# Patient Record
Sex: MALE | Race: WHITE | NOT HISPANIC OR LATINO | Employment: UNEMPLOYED | URBAN - METROPOLITAN AREA
[De-identification: names, ages, dates, MRNs, and addresses within clinical notes are randomized per-mention and may not be internally consistent; named-entity substitution may affect disease eponyms.]

---

## 2017-01-27 ENCOUNTER — OFFICE VISIT (OUTPATIENT)
Dept: FAMILY MEDICINE | Facility: CLINIC | Age: 37
End: 2017-01-27
Payer: COMMERCIAL

## 2017-01-27 VITALS
SYSTOLIC BLOOD PRESSURE: 132 MMHG | HEART RATE: 93 BPM | HEIGHT: 72 IN | WEIGHT: 315 LBS | BODY MASS INDEX: 42.66 KG/M2 | DIASTOLIC BLOOD PRESSURE: 88 MMHG | OXYGEN SATURATION: 98 %

## 2017-01-27 DIAGNOSIS — J45.20 MILD INTERMITTENT ASTHMA WITHOUT COMPLICATION: ICD-10-CM

## 2017-01-27 DIAGNOSIS — I10 ESSENTIAL HYPERTENSION: Primary | ICD-10-CM

## 2017-01-27 DIAGNOSIS — F33.42 RECURRENT MAJOR DEPRESSIVE DISORDER, IN FULL REMISSION: ICD-10-CM

## 2017-01-27 DIAGNOSIS — J30.1 NON-SEASONAL ALLERGIC RHINITIS DUE TO POLLEN: ICD-10-CM

## 2017-01-27 PROCEDURE — 99999 PR PBB SHADOW E&M-EST. PATIENT-LVL III: CPT | Mod: PBBFAC,,, | Performed by: FAMILY MEDICINE

## 2017-01-27 PROCEDURE — 1159F MED LIST DOCD IN RCRD: CPT | Mod: S$GLB,,, | Performed by: FAMILY MEDICINE

## 2017-01-27 PROCEDURE — 99214 OFFICE O/P EST MOD 30 MIN: CPT | Mod: S$GLB,,, | Performed by: FAMILY MEDICINE

## 2017-01-27 PROCEDURE — 3075F SYST BP GE 130 - 139MM HG: CPT | Mod: S$GLB,,, | Performed by: FAMILY MEDICINE

## 2017-01-27 PROCEDURE — 3079F DIAST BP 80-89 MM HG: CPT | Mod: S$GLB,,, | Performed by: FAMILY MEDICINE

## 2017-01-27 RX ORDER — HYDROCHLOROTHIAZIDE 25 MG/1
25 TABLET ORAL DAILY
Qty: 30 TABLET | Refills: 11 | Status: SHIPPED | OUTPATIENT
Start: 2017-01-27 | End: 2017-04-24

## 2017-01-27 RX ORDER — MONTELUKAST SODIUM 10 MG/1
10 TABLET ORAL DAILY
COMMUNITY
Start: 2016-12-29 | End: 2018-01-24

## 2017-01-27 RX ORDER — FLUOXETINE HYDROCHLORIDE 20 MG/1
20 CAPSULE ORAL DAILY
COMMUNITY
Start: 2016-11-29 | End: 2017-05-25

## 2017-01-27 NOTE — PROGRESS NOTES
Subjective:       Patient ID: Greg Garcia is a 36 y.o. male.    Chief Complaint: Depression    HPI     bp at home syst low 140's.      Given new auto cpap 2 1/2 weeks ago.  Reports sleeping better in re: to his new cpap    Depression stable at present. Some days, feels more down than others. Compliant with his medications.     Asthma stable. No wheeze or cough.     Chronic rhinitis stable.       Review of Systems      Review of Systems   Constitutional: Negative for fever and chills.   HENT: Negative for hearing loss and neck stiffness.    Eyes: Negative for redness and itching.   Respiratory: Negative for cough and choking.    Cardiovascular: Negative for chest pain and leg swelling.  Abdomen: Negative for abdominal pain and blood in stool.   Genitourinary: Negative for dysuria and flank pain.   Musculoskeletal: Negative for back pain and gait problem.   Neurological: Negative for light-headedness and headaches.   Hematological: Negative for adenopathy.   Psychiatric/Behavioral: Negative for behavioral problems.       Objective:      Physical Exam   HENT:   Head: Atraumatic.   Eyes: Conjunctivae are normal. Pupils are equal, round, and reactive to light.   Neck: Normal range of motion.   Cardiovascular: Normal rate and regular rhythm.    No murmur heard.  Pulmonary/Chest: Effort normal and breath sounds normal. He has no wheezes.   Lymphadenopathy:     He has no cervical adenopathy.       Assessment:       1. Essential hypertension    2. Mild intermittent asthma without complication    3. Non-seasonal allergic rhinitis due to pollen    4. Recurrent major depressive disorder, in full remission        Plan:       Essential hypertension    Mild intermittent asthma without complication    Non-seasonal allergic rhinitis due to pollen    Recurrent major depressive disorder, in full remission    Other orders  -     hydrochlorothiazide (HYDRODIURIL) 25 MG tablet; Take 1 tablet (25 mg total) by mouth once daily.   Dispense: 30 tablet; Refill: 11              Plan:  Inc hctz to 25 mg daily. Serial bp checks  Cont all other meds        Medication List with Changes/Refills   New Medications    HYDROCHLOROTHIAZIDE (HYDRODIURIL) 25 MG TABLET    Take 1 tablet (25 mg total) by mouth once daily.   Current Medications    ALBUTEROL (PROVENTIL) 5 MG/ML NEBULIZER SOLUTION    Take 2.5 mg by nebulization every 6 (six) hours as needed for Wheezing.    AZELASTINE (ASTELIN) 137 MCG (0.1 %) NASAL SPRAY    1 spray (137 mcg total) by Nasal route 2 (two) times daily.    BUPROPION (WELLBUTRIN XL) 150 MG TB24 TABLET        CETIRIZINE-PSEUDOEPHEDRINE 5-120 MG TB12    Take 5 mg by mouth 2 (two) times daily.    FLUOXETINE (PROZAC) 20 MG CAPSULE    Take 20 mg by mouth once daily.    FLUOXETINE (PROZAC) 40 MG CAPSULE    Take 40 mg by mouth once daily.     FLUTICASONE (FLONASE) 50 MCG/ACTUATION NASAL SPRAY    2 sprays by Each Nare route once daily.    FLUTICASONE-SALMETEROL 250-50 MCG/DOSE (ADVAIR DISKUS) 250-50 MCG/DOSE DISKUS INHALER    Inhale 1 puff into the lungs as needed.    GLUCOSAMINE-CHONDROITIN 500-400 MG TABLET    Take 1 tablet by mouth 2 (two) times daily.    IBUPROFEN (ADVIL,MOTRIN) 200 MG TABLET    Take 200 mg by mouth 2 (two) times daily. 4 tab twice daily    MONTELUKAST (SINGULAIR) 10 MG TABLET    Take 10 mg by mouth once daily.    MULTIVITAMIN (ONE DAILY MULTIVITAMIN) PER TABLET    Take 1 tablet by mouth once daily.    OMEPRAZOLE (PRILOSEC OTC) 20 MG TABLET    Take 20 mg by mouth once daily.   Discontinued Medications    FLUOXETINE (PROZAC) 10 MG CAPSULE    Take 20 mg by mouth once daily.     HYDROCHLOROTHIAZIDE (MICROZIDE) 12.5 MG CAPSULE    Take 1 capsule (12.5 mg total) by mouth once daily.    LEVOCETIRIZINE (XYZAL) 5 MG TABLET    Take 1 tablet (5 mg total) by mouth every evening.

## 2017-01-27 NOTE — MR AVS SNAPSHOT
[facility]  [address]  [facility] [address]  Phone: [phone]  Fax: [phone]                  Greg Garcia   2017 9:20 AM   Office Visit    Description:  Male : 1980   Provider:  Ignacio Terrell MD   Department:  [facility]           Reason for Visit     Depression           Diagnoses this Visit        Comments    Essential hypertension    -  Primary     Mild intermittent asthma without complication         Non-seasonal allergic rhinitis due to pollen         Recurrent major depressive disorder, in full remission                To Do List           Future Appointments        Provider Department Dept Phone    2017 10:00 AM INJECTION, INFECTIOUS DISEASES [address] Injection Room [phone]    2017 9:20 AM Ignacio Terrell MD [facility] [phone]      Goals (5 Years of Data)     None      Follow-Up and Disposition     Return in about 6 months (around 2017).       These Medications        Disp Refills Start End    hydrochlorothiazide (HYDRODIURIL) 25 MG tablet 30 tablet 11 2017     Take 1 tablet (25 mg total) by mouth once daily. - Oral    Pharmacy: [facility] - [place], [place] - [address]  Ph #: [phone]         [facility]     [facility] Nurse Care Line -  Assistance  Registered nurses in the [facility] provide clinical advisement, health education, appointment booking, and other advisory services.  Call for this free service at [phone].             Medications           Message regarding Medications     Verify the changes and/or additions to your medication regime listed below are the same as discussed with your clinician today.  If any of these changes or additions are incorrect, please notify your healthcare provider.        START taking these NEW medications        Refills    hydrochlorothiazide (HYDRODIURIL) 25 MG tablet 11    Sig:  Take 1 tablet (25 mg total) by mouth once daily.    Class: Normal    Route: Oral      STOP taking these medications     levocetirizine (XYZAL) 5 MG tablet Take 1 tablet (5 mg total) by mouth every evening.    hydrochlorothiazide (MICROZIDE) 12.5 mg capsule Take 1 capsule (12.5 mg total) by mouth once daily.           Verify that the below list of medications is an accurate representation of the medications you are currently taking.  If none reported, the list may be blank. If incorrect, please contact your healthcare provider. Carry this list with you in case of emergency.           Current Medications     albuterol (PROVENTIL) 5 mg/mL nebulizer solution Take 2.5 mg by nebulization every 6 (six) hours as needed for Wheezing.    azelastine (ASTELIN) 137 mcg (0.1 %) nasal spray 1 spray (137 mcg total) by Nasal route 2 (two) times daily.    buPROPion (WELLBUTRIN XL) 150 MG TB24 tablet     cetirizine-pseudoephedrine 5-120 mg Tb12 Take 5 mg by mouth 2 (two) times daily.    fluoxetine (PROZAC) 20 MG capsule Take 20 mg by mouth once daily.    fluoxetine (PROZAC) 40 MG capsule Take 40 mg by mouth once daily.     fluticasone (FLONASE) 50 mcg/actuation nasal spray 2 sprays by Each Nare route once daily.    fluticasone-salmeterol 250-50 mcg/dose (ADVAIR DISKUS) 250-50 mcg/dose diskus inhaler Inhale 1 puff into the lungs as needed.    glucosamine-chondroitin 500-400 mg tablet Take 1 tablet by mouth 2 (two) times daily.    ibuprofen (ADVIL,MOTRIN) 200 MG tablet Take 200 mg by mouth 2 (two) times daily. 4 tab twice daily    montelukast (SINGULAIR) 10 mg tablet Take 10 mg by mouth once daily.    multivitamin (ONE DAILY MULTIVITAMIN) per tablet Take 1 tablet by mouth once daily.    omeprazole (PRILOSEC OTC) 20 MG tablet Take 20 mg by mouth once daily.    hydrochlorothiazide (HYDRODIURIL) 25 MG tablet Take 1 tablet (25 mg total) by mouth once daily.           Clinical Reference Information           Vital Signs - Last Recorded  Most  recent update: 1/27/2017 10:01 AM by Ignacio Terrell MD    BP Pulse Ht Wt SpO2 BMI    132/88 93 6' (1.829 m) (!) 165.3 kg (364 lb 6.7 oz) 98% 49.42 kg/m2      Blood Pressure          Most Recent Value    BP  132/88      Allergies as of 1/27/2017     No Known Allergies      Immunizations Administered on Date of Encounter - 1/27/2017     None

## 2017-01-30 ENCOUNTER — CLINICAL SUPPORT (OUTPATIENT)
Dept: INFECTIOUS DISEASES | Facility: CLINIC | Age: 37
End: 2017-01-30
Payer: COMMERCIAL

## 2017-01-30 DIAGNOSIS — Z71.84 TRAVEL ADVICE ENCOUNTER: ICD-10-CM

## 2017-01-30 DIAGNOSIS — Z23 IMMUNIZATION DUE: ICD-10-CM

## 2017-01-30 PROCEDURE — 99999 PR PBB SHADOW E&M-EST. PATIENT-LVL I: CPT | Mod: PBBFAC,,,

## 2017-01-30 PROCEDURE — 90471 IMMUNIZATION ADMIN: CPT | Mod: S$GLB,,, | Performed by: INTERNAL MEDICINE

## 2017-01-30 PROCEDURE — 90632 HEPA VACCINE ADULT IM: CPT | Mod: S$GLB,,, | Performed by: INTERNAL MEDICINE

## 2017-03-22 ENCOUNTER — PATIENT MESSAGE (OUTPATIENT)
Dept: FAMILY MEDICINE | Facility: CLINIC | Age: 37
End: 2017-03-22

## 2017-03-22 DIAGNOSIS — R53.83 FATIGUE, UNSPECIFIED TYPE: Primary | ICD-10-CM

## 2017-03-27 ENCOUNTER — LAB VISIT (OUTPATIENT)
Dept: LAB | Facility: HOSPITAL | Age: 37
End: 2017-03-27
Attending: FAMILY MEDICINE
Payer: COMMERCIAL

## 2017-03-27 DIAGNOSIS — R53.83 FATIGUE, UNSPECIFIED TYPE: ICD-10-CM

## 2017-03-27 LAB
BASOPHILS # BLD AUTO: 0.04 K/UL
BASOPHILS NFR BLD: 0.5 %
DIFFERENTIAL METHOD: NORMAL
EOSINOPHIL # BLD AUTO: 0.2 K/UL
EOSINOPHIL NFR BLD: 2.8 %
ERYTHROCYTE [DISTWIDTH] IN BLOOD BY AUTOMATED COUNT: 13 %
HCT VFR BLD AUTO: 43.6 %
HGB BLD-MCNC: 14.3 G/DL
LYMPHOCYTES # BLD AUTO: 2.6 K/UL
LYMPHOCYTES NFR BLD: 31.2 %
MCH RBC QN AUTO: 30.3 PG
MCHC RBC AUTO-ENTMCNC: 32.8 %
MCV RBC AUTO: 92 FL
MONOCYTES # BLD AUTO: 0.6 K/UL
MONOCYTES NFR BLD: 7.4 %
NEUTROPHILS # BLD AUTO: 4.8 K/UL
NEUTROPHILS NFR BLD: 57.9 %
PLATELET # BLD AUTO: 264 K/UL
PMV BLD AUTO: 12.4 FL
RBC # BLD AUTO: 4.72 M/UL
WBC # BLD AUTO: 8.23 K/UL

## 2017-03-27 PROCEDURE — 82306 VITAMIN D 25 HYDROXY: CPT

## 2017-03-27 PROCEDURE — 84443 ASSAY THYROID STIM HORMONE: CPT

## 2017-03-27 PROCEDURE — 85025 COMPLETE CBC W/AUTO DIFF WBC: CPT

## 2017-03-27 PROCEDURE — 36415 COLL VENOUS BLD VENIPUNCTURE: CPT | Mod: PO

## 2017-03-27 PROCEDURE — 82607 VITAMIN B-12: CPT

## 2017-03-28 LAB
25(OH)D3+25(OH)D2 SERPL-MCNC: 53 NG/ML
TSH SERPL DL<=0.005 MIU/L-ACNC: 2.9 UIU/ML
VIT B12 SERPL-MCNC: 683 PG/ML

## 2017-04-02 ENCOUNTER — PATIENT MESSAGE (OUTPATIENT)
Dept: FAMILY MEDICINE | Facility: CLINIC | Age: 37
End: 2017-04-02

## 2017-04-04 RX ORDER — AZELASTINE 1 MG/ML
SPRAY, METERED NASAL
Qty: 30 ML | Refills: 11 | Status: SHIPPED | OUTPATIENT
Start: 2017-04-04 | End: 2018-05-13 | Stop reason: SDUPTHER

## 2017-04-24 ENCOUNTER — OFFICE VISIT (OUTPATIENT)
Dept: FAMILY MEDICINE | Facility: CLINIC | Age: 37
End: 2017-04-24
Payer: COMMERCIAL

## 2017-04-24 VITALS
WEIGHT: 315 LBS | HEART RATE: 105 BPM | HEIGHT: 72 IN | DIASTOLIC BLOOD PRESSURE: 98 MMHG | OXYGEN SATURATION: 98 % | SYSTOLIC BLOOD PRESSURE: 138 MMHG | BODY MASS INDEX: 42.66 KG/M2

## 2017-04-24 DIAGNOSIS — I10 ESSENTIAL HYPERTENSION: ICD-10-CM

## 2017-04-24 DIAGNOSIS — R53.83 FATIGUE, UNSPECIFIED TYPE: Primary | ICD-10-CM

## 2017-04-24 PROCEDURE — 3080F DIAST BP >= 90 MM HG: CPT | Mod: S$GLB,,, | Performed by: FAMILY MEDICINE

## 2017-04-24 PROCEDURE — 3075F SYST BP GE 130 - 139MM HG: CPT | Mod: S$GLB,,, | Performed by: FAMILY MEDICINE

## 2017-04-24 PROCEDURE — 1160F RVW MEDS BY RX/DR IN RCRD: CPT | Mod: S$GLB,,, | Performed by: FAMILY MEDICINE

## 2017-04-24 PROCEDURE — 99999 PR PBB SHADOW E&M-EST. PATIENT-LVL III: CPT | Mod: PBBFAC,,, | Performed by: FAMILY MEDICINE

## 2017-04-24 PROCEDURE — 99214 OFFICE O/P EST MOD 30 MIN: CPT | Mod: S$GLB,,, | Performed by: FAMILY MEDICINE

## 2017-04-24 RX ORDER — ERGOCALCIFEROL 1.25 MG/1
5000 CAPSULE ORAL
COMMUNITY
End: 2017-06-08

## 2017-04-24 RX ORDER — LISINOPRIL 10 MG/1
10 TABLET ORAL DAILY
Qty: 30 TABLET | Refills: 5 | Status: SHIPPED | OUTPATIENT
Start: 2017-04-24 | End: 2017-07-27

## 2017-04-24 NOTE — PROGRESS NOTES
Subjective:       Patient ID: Greg Garcia is a 36 y.o. male.    Chief Complaint: Results and Fatigue    HPI     Reports fatigue over the past 3 months since I increased his hctz from 12.5 mg to 25 mg daily.      Review of Systems      Review of Systems   Constitutional: Negative for fever and chills.   HENT: Negative for hearing loss and neck stiffness.    Eyes: Negative for redness and itching.   Respiratory: Negative for cough and choking.    Cardiovascular: Negative for chest pain and leg swelling.  Abdomen: Negative for abdominal pain and blood in stool.   Genitourinary: Negative for dysuria and flank pain.   Musculoskeletal: Negative for back pain and gait problem.   Neurological: Negative for light-headedness and headaches.   Hematological: Negative for adenopathy.       Objective:      Physical Exam   HENT:   Head: Atraumatic.   Eyes: Conjunctivae are normal. Pupils are equal, round, and reactive to light.   Neck: Normal range of motion.   Cardiovascular: Normal rate and regular rhythm.    No murmur heard.  Pulmonary/Chest: Effort normal and breath sounds normal. He has no wheezes.   Lymphadenopathy:     He has no cervical adenopathy.       Assessment:       1. Fatigue, unspecified type    2. Essential hypertension        Plan:       Fatigue, unspecified type    Essential hypertension    Other orders  -     lisinopril 10 MG tablet; Take 1 tablet (10 mg total) by mouth once daily.  Dispense: 30 tablet; Refill: 5      Plan:  D/c hctz  Start lisinopril  Serial bp checks    Medication List with Changes/Refills   New Medications    LISINOPRIL 10 MG TABLET    Take 1 tablet (10 mg total) by mouth once daily.   Current Medications    ALBUTEROL (PROVENTIL) 5 MG/ML NEBULIZER SOLUTION    Take 2.5 mg by nebulization every 6 (six) hours as needed for Wheezing.    AZELASTINE (ASTELIN) 137 MCG (0.1 %) NASAL SPRAY    USE ONE SPRAY(S) IN EACH NOSTRIL TWICE DAILY    BUPROPION (WELLBUTRIN XL) 150 MG TB24 TABLET         CETIRIZINE-PSEUDOEPHEDRINE 5-120 MG TB12    Take 5 mg by mouth 2 (two) times daily.    ERGOCALCIFEROL (VITAMIN D2) 50,000 UNIT CAP    Take 50,000 Units by mouth every 7 days.    FLUOXETINE (PROZAC) 20 MG CAPSULE    Take 20 mg by mouth once daily.    FLUTICASONE (FLONASE) 50 MCG/ACTUATION NASAL SPRAY    2 sprays by Each Nare route once daily.    FLUTICASONE-SALMETEROL 250-50 MCG/DOSE (ADVAIR DISKUS) 250-50 MCG/DOSE DISKUS INHALER    Inhale 1 puff into the lungs as needed.    GLUCOSAMINE-CHONDROITIN 500-400 MG TABLET    Take 1 tablet by mouth 2 (two) times daily.    IBUPROFEN (ADVIL,MOTRIN) 200 MG TABLET    Take 200 mg by mouth 2 (two) times daily. 4 tab twice daily    MONTELUKAST (SINGULAIR) 10 MG TABLET    Take 10 mg by mouth once daily.    MULTIVITAMIN (ONE DAILY MULTIVITAMIN) PER TABLET    Take 1 tablet by mouth once daily.    OMEPRAZOLE (PRILOSEC OTC) 20 MG TABLET    Take 20 mg by mouth once daily.   Discontinued Medications    FLUOXETINE (PROZAC) 40 MG CAPSULE    Take 40 mg by mouth once daily.     HYDROCHLOROTHIAZIDE (HYDRODIURIL) 25 MG TABLET    Take 1 tablet (25 mg total) by mouth once daily.

## 2017-04-24 NOTE — MR AVS SNAPSHOT
[facility]  [address]  [place] [address]  Phone: [phone]  Fax: [phone]                  Greg Garcai   2017 3:40 PM   Office Visit    Description:  Male : 1980   Provider:  Ignacio Terrell MD   Department:  [facility]           Reason for Visit     Results     Fatigue                To Do List           Future Appointments        Provider Department Dept Phone    2017 9:20 AM Ignacio Terrell MD [facility] [phone]      Goals (5 Years of Data)     None      Follow-Up and Disposition     Return in about 3 months (around 2017).       These Medications        Disp Refills Start End    lisinopril 10 MG tablet 30 tablet 5 2017    Take 1 tablet (10 mg total) by mouth once daily. - Oral    Pharmacy: [facility] - [place] - [address]  Ph #: [phone]         [facility]     [facility] Nurse Care Line -  Assistance  Unless otherwise directed by your provider, please contact [facility], our nurse care line that is available for  assistance.     Registered nurses in the [facility] provide: appointment scheduling, clinical advisement, health education, and other advisory services.  Call: [phone] (toll free)               Medications           Message regarding Medications     Verify the changes and/or additions to your medication regime listed below are the same as discussed with your clinician today.  If any of these changes or additions are incorrect, please notify your healthcare provider.        START taking these NEW medications        Refills    lisinopril 10 MG tablet 5    Sig: Take 1 tablet (10 mg total) by mouth once daily.    Class: Normal    Route: Oral      STOP taking these medications     hydrochlorothiazide (HYDRODIURIL) 25 MG tablet Take 1 tablet (25 mg total) by mouth once daily.           Verify that the  below list of medications is an accurate representation of the medications you are currently taking.  If none reported, the list may be blank. If incorrect, please contact your healthcare provider. Carry this list with you in case of emergency.           Current Medications     albuterol (PROVENTIL) 5 mg/mL nebulizer solution Take 2.5 mg by nebulization every 6 (six) hours as needed for Wheezing.    azelastine (ASTELIN) 137 mcg (0.1 %) nasal spray USE ONE SPRAY(S) IN EACH NOSTRIL TWICE DAILY    buPROPion (WELLBUTRIN XL) 150 MG TB24 tablet     cetirizine-pseudoephedrine 5-120 mg Tb12 Take 5 mg by mouth 2 (two) times daily.    ergocalciferol (VITAMIN D2) 50,000 unit Cap Take 50,000 Units by mouth every 7 days.    fluoxetine (PROZAC) 20 MG capsule Take 20 mg by mouth once daily.    fluticasone (FLONASE) 50 mcg/actuation nasal spray 2 sprays by Each Nare route once daily.    fluticasone-salmeterol 250-50 mcg/dose (ADVAIR DISKUS) 250-50 mcg/dose diskus inhaler Inhale 1 puff into the lungs as needed.    glucosamine-chondroitin 500-400 mg tablet Take 1 tablet by mouth 2 (two) times daily.    ibuprofen (ADVIL,MOTRIN) 200 MG tablet Take 200 mg by mouth 2 (two) times daily. 4 tab twice daily    montelukast (SINGULAIR) 10 mg tablet Take 10 mg by mouth once daily.    multivitamin (ONE DAILY MULTIVITAMIN) per tablet Take 1 tablet by mouth once daily.    omeprazole (PRILOSEC OTC) 20 MG tablet Take 20 mg by mouth once daily.    lisinopril 10 MG tablet Take 1 tablet (10 mg total) by mouth once daily.           Clinical Reference Information           Your Vitals Were     BP Pulse Height Weight SpO2 BMI    138/98 105 6' (1.829 m) 171.8 kg (378 lb 12 oz) 98% 51.37 kg/m2      Blood Pressure          Most Recent Value    BP  (!)  138/98      Allergies as of 4/24/2017     No Known Allergies      Immunizations Administered on Date of Encounter - 4/24/2017     None      Language Assistance Services     ATTENTION: Language assistance  services are available, free of charge. Please call [phone].      ATENCIÓN: Si habla paula, tiene a lay disposición servicios gratuitos de asistencia lingüística. Llame al [phone].     CHÚ Ý: N?u b?n nói Ti?ng Vi?t, có các d?ch v? h? tr? ngôn ng? mi?n phí dành cho b?n. G?i s? [phone].         [facility] complies with applicable Federal civil rights laws and does not discriminate on the basis of race, color, national origin, age, disability, or sex.

## 2017-05-17 ENCOUNTER — TELEPHONE (OUTPATIENT)
Dept: FAMILY MEDICINE | Facility: CLINIC | Age: 37
End: 2017-05-17

## 2017-05-17 NOTE — TELEPHONE ENCOUNTER
----- Message from Roberta Talbot sent at 5/17/2017  4:00 PM CDT -----  Patient is calling to ask for a prescription for jock itch. He has tried over the counter medications but to no avail. It has been over a week now. Please remit to:      [facility] [place] [place] [facility] [address]  [address]  [facility] [address]  Phone: [phone] Fax: [phone]    Please call patient back with advice or when completed at [phone].

## 2017-05-18 RX ORDER — TERBINAFINE HYDROCHLORIDE 250 MG/1
250 TABLET ORAL DAILY
Qty: 14 TABLET | Refills: 0 | Status: SHIPPED | OUTPATIENT
Start: 2017-05-18 | End: 2017-06-01

## 2017-05-24 ENCOUNTER — NURSE TRIAGE (OUTPATIENT)
Dept: ADMINISTRATIVE | Facility: CLINIC | Age: 37
End: 2017-05-24

## 2017-05-25 ENCOUNTER — OFFICE VISIT (OUTPATIENT)
Dept: FAMILY MEDICINE | Facility: CLINIC | Age: 37
End: 2017-05-25
Payer: COMMERCIAL

## 2017-05-25 VITALS
TEMPERATURE: 98 F | WEIGHT: 315 LBS | SYSTOLIC BLOOD PRESSURE: 156 MMHG | HEART RATE: 78 BPM | BODY MASS INDEX: 42.66 KG/M2 | HEIGHT: 72 IN | DIASTOLIC BLOOD PRESSURE: 80 MMHG

## 2017-05-25 DIAGNOSIS — H10.33 ACUTE CONJUNCTIVITIS OF BOTH EYES, UNSPECIFIED ACUTE CONJUNCTIVITIS TYPE: ICD-10-CM
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
## 2017-05-25 DIAGNOSIS — Z00.00 PREVENTATIVE HEALTH CARE: ICD-10-CM

## 2017-05-25 PROCEDURE — 99213 OFFICE O/P EST LOW 20 MIN: CPT | Mod: 25,S$GLB,, | Performed by: FAMILY MEDICINE

## 2017-05-25 PROCEDURE — 99999 PR PBB SHADOW E&M-EST. PATIENT-LVL III: CPT | Mod: PBBFAC,,, | Performed by: FAMILY MEDICINE

## 2017-05-25 PROCEDURE — 90715 TDAP VACCINE 7 YRS/> IM: CPT | Mod: S$GLB,,, | Performed by: FAMILY MEDICINE

## 2017-05-25 PROCEDURE — 90471 IMMUNIZATION ADMIN: CPT | Mod: S$GLB,,, | Performed by: FAMILY MEDICINE

## 2017-05-25 RX ORDER — FLUOXETINE HYDROCHLORIDE 40 MG/1
1 CAPSULE ORAL DAILY
COMMUNITY
Start: 2017-05-23 | End: 2018-11-29

## 2017-05-25 RX ORDER — SULFAMETHOXAZOLE AND TRIMETHOPRIM 800; 160 MG/1; MG/1
1 TABLET ORAL 2 TIMES DAILY
Qty: 14 TABLET | Refills: 0 | Status: SHIPPED | OUTPATIENT
Start: 2017-05-25 | End: 2017-06-08

## 2017-05-25 RX ORDER — ERYTHROMYCIN 5 MG/G
OINTMENT OPHTHALMIC 3 TIMES DAILY
Qty: 3.5 G | Refills: 0 | Status: SHIPPED | OUTPATIENT
Start: 2017-05-25 | End: 2017-07-27

## 2017-05-25 NOTE — PROGRESS NOTES
Subjective:       Patient ID: Greg Garcia is a 37 y.o. male.    Chief Complaint: Possible bite on thigh (Left inner thigh, red and painful; noticed yesterday:  Tetnus today) and Eyes swollen (Painful )    Onset yesterday with left inner thigh red pimple.  Swelling increased last night mili some warmth.  Now with central ulceration.  No pain.  Some mild irritation of the area    Also with bilateral eye crusting and redness for the past 1 week.  He feels like it may have started after his CPAP mask was blowing into his eyes.        Past Medical History:   Diagnosis Date    AR (allergic rhinitis)     Asthma     Cervicalgia     Depression     sees Amy Olivas, psychologist    JOHN (obstructive sleep apnea)     cpap       No past surgical history on file.    Review of patient's allergies indicates:  No Known Allergies    Social History     Social History    Marital status:      Spouse name: N/A    Number of children: N/A    Years of education: N/A     Occupational History    Not on file.     Social History Main Topics    Smoking status: Never Smoker    Smokeless tobacco: Never Used    Alcohol use Yes      Comment: occasional    Drug use: Unknown    Sexual activity: Not on file     Other Topics Concern    Not on file     Social History Narrative    No narrative on file       Current Outpatient Prescriptions on File Prior to Visit   Medication Sig Dispense Refill    azelastine (ASTELIN) 137 mcg (0.1 %) nasal spray USE ONE SPRAY(S) IN EACH NOSTRIL TWICE DAILY 30 mL 11    buPROPion (WELLBUTRIN XL) 150 MG TB24 tablet       cetirizine-pseudoephedrine 5-120 mg Tb12 Take 5 mg by mouth 2 (two) times daily. (Patient taking differently: Take 5 mg by mouth once daily. ) 48 tablet 2    ergocalciferol (VITAMIN D2) 50,000 unit Cap Take 50,000 Units by mouth every 7 days.      fluticasone (FLONASE) 50 mcg/actuation nasal spray 2 sprays by Each Nare route once daily. 16 g 11     fluticasone-salmeterol 250-50 mcg/dose (ADVAIR DISKUS) 250-50 mcg/dose diskus inhaler Inhale 1 puff into the lungs as needed. 180 each 3    glucosamine-chondroitin 500-400 mg tablet Take 1 tablet by mouth 2 (two) times daily.      ibuprofen (ADVIL,MOTRIN) 200 MG tablet Take 200 mg by mouth 2 (two) times daily. 4 tab twice daily      lisinopril 10 MG tablet Take 1 tablet (10 mg total) by mouth once daily. 30 tablet 5    montelukast (SINGULAIR) 10 mg tablet Take 10 mg by mouth once daily.      multivitamin (ONE DAILY MULTIVITAMIN) per tablet Take 1 tablet by mouth once daily.      omeprazole (PRILOSEC OTC) 20 MG tablet Take 20 mg by mouth once daily.      terbinafine HCl (LAMISIL) 250 mg tablet Take 1 tablet (250 mg total) by mouth once daily. 14 tablet 0    albuterol (PROVENTIL) 5 mg/mL nebulizer solution Take 2.5 mg by nebulization every 6 (six) hours as needed for Wheezing.       No current facility-administered medications on file prior to visit.        Family History   Problem Relation Age of Onset    Allergic rhinitis Neg Hx     Allergies Neg Hx     Angioedema Neg Hx     Asthma Neg Hx     Atopy Neg Hx     Eczema Neg Hx     Immunodeficiency Neg Hx     Rhinitis Neg Hx     Urticaria Neg Hx        Review of Systems   Constitutional: Negative for appetite change, chills, fever and unexpected weight change.   HENT: Negative for sore throat and trouble swallowing.    Eyes: Positive for discharge and itching. Negative for pain and visual disturbance.   Respiratory: Negative for cough, chest tightness, shortness of breath and wheezing.    Cardiovascular: Negative for chest pain, palpitations and leg swelling.   Gastrointestinal: Negative for abdominal pain, blood in stool, constipation, diarrhea and nausea.   Genitourinary: Negative for difficulty urinating, dysuria and hematuria.   Musculoskeletal: Negative for arthralgias, gait problem and neck pain.   Skin: Negative for rash and wound.   Neurological:  Negative for dizziness, weakness, light-headedness and headaches.   Hematological: Negative for adenopathy.   Psychiatric/Behavioral: Negative for dysphoric mood.       Objective:      BP (!) 156/80 (BP Location: Left arm, Patient Position: Sitting, BP Method: Manual)   Pulse 78   Temp 98.4 °F (36.9 °C) (Oral)   Ht 6' (1.829 m)   Wt (!) 175.7 kg (387 lb 5.6 oz)   BMI 52.53 kg/m²   Physical Exam   Constitutional: He appears well-developed and well-nourished.   HENT:   Head: Normocephalic and atraumatic.   Right Ear: Tympanic membrane and external ear normal.   Left Ear: Tympanic membrane and external ear normal.   Nose: No mucosal edema or rhinorrhea. Right sinus exhibits no maxillary sinus tenderness and no frontal sinus tenderness. Left sinus exhibits no maxillary sinus tenderness and no frontal sinus tenderness.   Mouth/Throat: Mucous membranes are normal. No oropharyngeal exudate, posterior oropharyngeal edema or posterior oropharyngeal erythema.   Eyes: EOM are normal. Pupils are equal, round, and reactive to light. Right eye exhibits no discharge. Left eye exhibits no discharge. Right conjunctiva is injected. Left conjunctiva is injected. No scleral icterus.       Neck: Normal range of motion. Neck supple. No thyromegaly present.   Cardiovascular: Normal rate, regular rhythm, normal heart sounds and intact distal pulses.    No murmur heard.  Pulmonary/Chest: Effort normal and breath sounds normal. No respiratory distress. He has no wheezes. He has no rales.   Lymphadenopathy:     He has no cervical adenopathy.               Assessment:       1. Insect bite, initial encounter    2. Acute conjunctivitis of both eyes, unspecified acute conjunctivitis type    3. Preventative health care        Plan:       Insect bite, initial encounter    Acute conjunctivitis of both eyes, unspecified acute conjunctivitis type    Preventative health care  -     Tdap Vaccine    Other orders  -     Cancel: Tdap Vaccine  -      sulfamethoxazole-trimethoprim 800-160mg (BACTRIM DS) 800-160 mg Tab; Take 1 tablet by mouth 2 (two) times daily.  Dispense: 14 tablet; Refill: 0  -     erythromycin (ROMYCIN) ophthalmic ointment; Place into both eyes 3 (three) times daily.  Dispense: 3.5 g; Refill: 0        Counseled on regular exercise, maintenance of a healthy weight, balanced diet rich in fruits/vegetables and lean protein, and avoidance of unhealthy habits like smoking and excessive alcohol intake.

## 2017-05-25 NOTE — TELEPHONE ENCOUNTER
"    Reason for Disposition   Boil suspected (i.e., painful red lump and NO spider bite)   [1] Spreading redness around the boil AND [2] no fever    Answer Assessment - Initial Assessment Questions  1. TYPE of SPIDER: "What type of spider was it?"  (e.g., name, unknown, or brief description)      Unknown, unseen    2. LOCATION: "Where is the bite located?"       Left leg, inner thigh, near knee.    3. PAIN: "Is there any pain?" If so, ask: "How bad is it?"  (Scale 1-10; or mild, moderate, severe)      Yes. Mild pain. Hurts to touch it.  When I stand it feels very strange.    4. SWELLING: "How big is the swelling?" (Inches, cm or compare to coins)       Swelling bigger than a half dollar.  Has a scab on the top size of pin head.    5. ONSET: "When did the bite occur?" (Minutes or hours ago)       Might have been yesterday.    6. TETANUS: "When was the last tetanus booster?"       It's been a while.    7. OTHER SYMPTOMS: "Do you have any other symptoms?"  (e.g., muscle cramps, abdominal pain, change in urine color)      No to all.    Protocols used: ST SPIDER BITE - [facility], ST BOIL (SKIN ABSCESS)-[facility]    Home care advice given, and appointment made tomorrow for Greg to see Dr Neville for evaluation of enlarging red, firm, abscess v spider bite.  He said he has "picked" at the top of the area, now scabbed, but states the reddened area is larger than a half dollar.  Message to Ignacio Terrell MD , who did not have any opening for me to book on tomorrow.  Please contact caller directly with any additional care advice.    "

## 2017-06-02 ENCOUNTER — PATIENT MESSAGE (OUTPATIENT)
Dept: FAMILY MEDICINE | Facility: CLINIC | Age: 37
End: 2017-06-02

## 2017-06-02 DIAGNOSIS — R21 GROIN RASH: Primary | ICD-10-CM

## 2017-06-08 ENCOUNTER — INITIAL CONSULT (OUTPATIENT)
Dept: DERMATOLOGY | Facility: CLINIC | Age: 37
End: 2017-06-08
Payer: COMMERCIAL

## 2017-06-08 VITALS — BODY MASS INDEX: 42.66 KG/M2 | HEIGHT: 72 IN | WEIGHT: 315 LBS

## 2017-06-08 DIAGNOSIS — L30.4 INTERTRIGO: Primary | ICD-10-CM

## 2017-06-08 PROCEDURE — 99201 PR OFFICE/OUTPT VISIT,NEW,LEVL I: CPT | Mod: S$GLB,,, | Performed by: DERMATOLOGY

## 2017-06-08 PROCEDURE — 99999 PR PBB SHADOW E&M-EST. PATIENT-LVL II: CPT | Mod: PBBFAC,,, | Performed by: DERMATOLOGY

## 2017-06-08 RX ORDER — IODOQUINOL, HYDROCORTISONE ACETATE AND ALOE VERA LEAF 10; 20; 10 MG/G; MG/G; MG/G
GEL TOPICAL
Qty: 48 G | Refills: 5 | Status: SHIPPED | OUTPATIENT
Start: 2017-06-08 | End: 2018-08-30

## 2017-06-08 RX ORDER — ACETAMINOPHEN 500 MG
5000 TABLET ORAL DAILY
COMMUNITY

## 2017-06-08 NOTE — PATIENT INSTRUCTIONS
Intertrigo:    Recommend white vinegar: water 1:3 compresses  followed by cool blow dry and then application of prescription medication.     Cool blow dry after showering. Once clear, use Zeasorb AF powder for maintenance to affected area.

## 2017-06-08 NOTE — PROGRESS NOTES
"  Subjective:       Patient ID:  Greg Garcia is a 37 y.o. male who presents for   Chief Complaint   Patient presents with    Itching     Groin, 6 weeks, itching, was on bactrim and OTC creams     Initial visit  Patient with asthma and seasonal allergies  C/o 6 weeks history of "jock itch"  Given PO lamisil 250 QD x 2 weeks, never fully went away  Some improvement of itch component    Using OTC lamisil cream and gold bond powder    Sweats a lot  Desk job  Occasional athlete's foot        Itching  - Initial  Affected locations: groin  Duration: 6 weeks  Signs / symptoms: itching and burning (Some broken skin)  Severity: moderate  Timing: constant  Aggravated by: scratching  Treatments tried: Bactrim and OTC Creams.  Improvement on treatment: mild        Review of Systems   Constitutional: Positive for fatigue. Negative for fever, chills and night sweats.   Skin: Positive for itching and rash. Negative for wears hat.   Hematologic/Lymphatic: Bruises/bleeds easily (bruises easily).        Objective:    Physical Exam   Constitutional: He appears well-developed and well-nourished. No distress.   Neurological: He is alert and oriented to person, place, and time. He is not disoriented.   Psychiatric: He has a normal mood and affect.   Skin:                 Diagram Legend     Erythematous scaling macule/papule c/w actinic keratosis       Vascular papule c/w angioma      Pigmented verrucoid papule/plaque c/w seborrheic keratosis      Yellow umbilicated papule c/w sebaceous hyperplasia      Irregularly shaped tan macule c/w lentigo     1-2 mm smooth white papules consistent with Milia      Movable subcutaneous cyst with punctum c/w epidermal inclusion cyst      Subcutaneous movable cyst c/w pilar cyst      Firm pink to brown papule c/w dermatofibroma      Pedunculated fleshy papule(s) c/w skin tag(s)      Evenly pigmented macule c/w junctional nevus     Mildly variegated pigmented, slightly irregular-bordered " macule c/w mildly atypical nevus      Flesh colored to evenly pigmented papule c/w intradermal nevus       Pink pearly papule/plaque c/w basal cell carcinoma      Erythematous hyperkeratotic cursted plaque c/w SCC      Surgical scar with no sign of skin cancer recurrence      Open and closed comedones      Inflammatory papules and pustules      Verrucoid papule consistent consistent with wart     Erythematous eczematous patches and plaques     Dystrophic onycholytic nail with subungual debris c/w onychomycosis     Umbilicated papule    Erythematous-base heme-crusted tan verrucoid plaque consistent with inflamed seborrheic keratosis     Erythematous Silvery Scaling Plaque c/w Psoriasis     See annotation      Assessment / Plan:        Intertrigo  No tinea cruris    -     ALCORTIN A 2-1-1 % GlPk; AAA bid  Dispense: 48 g; Refill: 5  Recommend white vinegar: water 1:3 compresses  followed by cool blow dry and then application of prescription medication.   Cool blow dry after showering. Once clear, use Zeasorb AF powder for maintenance to affected area.               Return if symptoms worsen or fail to improve.

## 2017-06-08 NOTE — LETTER
June 8, 2017      Ignacio Terrell MD  [address] [facility] [address] [place] [address]           [place] - Dermatology  [address] [facility] [address]  Phone: [phone]          Patient: Greg Garcia   MR Number: [id number]   YOB: 1980   Date of Visit: 6/8/2017       Dear Dr. Ignacio Terrell:    Thank you for referring Greg Garcia to me for evaluation. Attached you will find relevant portions of my assessment and plan of care.    If you have questions, please do not hesitate to call me. I look forward to following Greg Garcia along with you.    Sincerely,    Stephanie Gleason MD    Enclosure  CC:  No Recipients    If you would like to receive this communication electronically, please contact [email or web address] or [phone] to request more information on [employer] Link access.    For providers and/or their staff who would like to refer a patient to [facility], please contact us through our one-stop-shop provider referral line, [facility], at [phone].    If you feel you have received this communication in error or would no longer like to receive these types of communications, please e-mail [email or web address]

## 2017-07-27 ENCOUNTER — PATIENT MESSAGE (OUTPATIENT)
Dept: ADMINISTRATIVE | Facility: OTHER | Age: 37
End: 2017-07-27

## 2017-07-27 ENCOUNTER — OFFICE VISIT (OUTPATIENT)
Dept: FAMILY MEDICINE | Facility: CLINIC | Age: 37
End: 2017-07-27
Payer: COMMERCIAL

## 2017-07-27 VITALS
HEIGHT: 72 IN | SYSTOLIC BLOOD PRESSURE: 138 MMHG | WEIGHT: 315 LBS | DIASTOLIC BLOOD PRESSURE: 88 MMHG | OXYGEN SATURATION: 95 % | BODY MASS INDEX: 42.66 KG/M2 | HEART RATE: 82 BPM

## 2017-07-27 DIAGNOSIS — I10 ESSENTIAL HYPERTENSION: Primary | ICD-10-CM

## 2017-07-27 DIAGNOSIS — J31.0 CHRONIC RHINITIS, UNSPECIFIED TYPE: ICD-10-CM

## 2017-07-27 DIAGNOSIS — G47.33 OSA ON CPAP: ICD-10-CM

## 2017-07-27 DIAGNOSIS — H04.123 DRY EYES: ICD-10-CM

## 2017-07-27 PROCEDURE — 99999 PR PBB SHADOW E&M-EST. PATIENT-LVL III: CPT | Mod: PBBFAC,,, | Performed by: FAMILY MEDICINE

## 2017-07-27 PROCEDURE — 99214 OFFICE O/P EST MOD 30 MIN: CPT | Mod: S$GLB,,, | Performed by: FAMILY MEDICINE

## 2017-07-27 RX ORDER — LISINOPRIL 20 MG/1
20 TABLET ORAL DAILY
Qty: 30 TABLET | Refills: 11 | Status: SHIPPED | OUTPATIENT
Start: 2017-07-27 | End: 2017-10-06 | Stop reason: SDUPTHER

## 2017-07-27 RX ORDER — FLUTICASONE PROPIONATE 50 MCG
2 SPRAY, SUSPENSION (ML) NASAL DAILY
Qty: 16 G | Refills: 11 | Status: SHIPPED | OUTPATIENT
Start: 2017-07-27 | End: 2018-04-24

## 2017-07-27 NOTE — PROGRESS NOTES
Subjective:       Patient ID: Greg Garcia is a 37 y.o. male.    Chief Complaint: Depression (f/u); Hypertension; and Eye Problem (dryness)    HPI     bp elevated today.  Compliant with his lisinopril.    Switched to a new psychotherapist 6-8 weeks ago.  Reports depression is more stable.     Pt reports dryness of bilat eyes from cpap machine.        Review of Systems      Review of Systems   Constitutional: Negative for fever and chills.   HENT: Negative for hearing loss and neck stiffness.    Eyes: Negative for redness and itching.   Respiratory: Negative for cough and choking.    Cardiovascular: Negative for chest pain and leg swelling.  Abdomen: Negative for abdominal pain and blood in stool.   Genitourinary: Negative for dysuria and flank pain.   Musculoskeletal: Negative for back pain and gait problem.   Neurological: Negative for light-headedness and headaches.   Hematological: Negative for adenopathy.   Psychiatric/Behavioral: Negative for behavioral problems.     Objective:      Physical Exam   HENT:   Head: Atraumatic.   Eyes: Conjunctivae are normal. Pupils are equal, round, and reactive to light.   Neck: Normal range of motion.   Cardiovascular: Normal rate and regular rhythm.    No murmur heard.  Pulmonary/Chest: Effort normal and breath sounds normal. He has no wheezes.   Lymphadenopathy:     He has no cervical adenopathy.       Assessment:       1. Essential hypertension    2. Chronic rhinitis, unspecified type    3. JOHN on CPAP    4. Dry eyes        Plan:       Essential hypertension  -     [facility]  -     Hypertension Digital Medicine ([facility]): Assign Onboarding Questionnaires    Chronic rhinitis, unspecified type  -     fluticasone (FLONASE) 50 mcg/actuation nasal spray; 2 sprays by Each Nare route once daily.  Dispense: 16 g; Refill: 11    OJHN on CPAP    Dry eyes    Other orders  -     lisinopril (PRINIVIL,ZESTRIL) 20 MG tablet; Take 1 tablet  (20 mg total) by mouth once daily.  Dispense: 30 tablet; Refill: 11          Plan:  Increase lisinopril to 20 mg daily. Serial bp checks. Enrol in [facility] flonase  otc refresh eye drops  Cont all other meds      Medication List with Changes/Refills   New Medications    LISINOPRIL (PRINIVIL,ZESTRIL) 20 MG TABLET    Take 1 tablet (20 mg total) by mouth once daily.   Current Medications    ALBUTEROL (PROVENTIL) 5 MG/ML NEBULIZER SOLUTION    Take 2.5 mg by nebulization every 6 (six) hours as needed for Wheezing.    ALCORTIN A 2-1-1 % GLPK    AAA bid    ARTIFICIAL TEARS OINTMENT (REFRESH P.M.) OINT    every evening.    AZELASTINE (ASTELIN) 137 MCG (0.1 %) NASAL SPRAY    USE ONE SPRAY(S) IN EACH NOSTRIL TWICE DAILY    BUPROPION (WELLBUTRIN XL) 150 MG TB24 TABLET        CETIRIZINE-PSEUDOEPHEDRINE 5-120 MG TB12    Take 5 mg by mouth 2 (two) times daily.    CHOLECALCIFEROL, VITAMIN D3, (VITAMIN D3) 5,000 UNIT TAB    Take 5,000 Units by mouth once daily.    FLUOXETINE (PROZAC) 40 MG CAPSULE    Take 1 capsule by mouth once daily at 6am.    FLUTICASONE-SALMETEROL 250-50 MCG/DOSE (ADVAIR DISKUS) 250-50 MCG/DOSE DISKUS INHALER    Inhale 1 puff into the lungs as needed.    GLUCOSAMINE-CHONDROITIN 500-400 MG TABLET    Take 1 tablet by mouth 2 (two) times daily.    IBUPROFEN (ADVIL,MOTRIN) 200 MG TABLET    Take 200 mg by mouth 2 (two) times daily. 4 tab twice daily    MONTELUKAST (SINGULAIR) 10 MG TABLET    Take 10 mg by mouth once daily.    MULTIVITAMIN (ONE DAILY MULTIVITAMIN) PER TABLET    Take 1 tablet by mouth once daily.    OMEPRAZOLE (PRILOSEC OTC) 20 MG TABLET    Take 20 mg by mouth once daily.   Changed and/or Refilled Medications    Modified Medication Previous Medication    FLUTICASONE (FLONASE) 50 MCG/ACTUATION NASAL SPRAY fluticasone (FLONASE) 50 mcg/actuation nasal spray       2 sprays by Each Nare route once daily.    2 sprays by Each Nare route once daily.   Discontinued Medications    ERYTHROMYCIN (ROMYCIN)  OPHTHALMIC OINTMENT    Place into both eyes 3 (three) times daily.    LISINOPRIL 10 MG TABLET    Take 1 tablet (10 mg total) by mouth once daily.    TERBINAFINE HCL (LAMISIL TOP)    Apply topically.

## 2017-07-28 ENCOUNTER — PATIENT MESSAGE (OUTPATIENT)
Dept: ADMINISTRATIVE | Facility: OTHER | Age: 37
End: 2017-07-28

## 2017-07-31 ENCOUNTER — PATIENT OUTREACH (OUTPATIENT)
Dept: OTHER | Facility: OTHER | Age: 37
End: 2017-07-31

## 2017-07-31 NOTE — LETTER
Nicolasa Buitrago, PharmD  [address] [place] [address]     Dear Greg Garcia,    Welcome to the [facility] Hypertension Digital Medicine Program!         My name is Nicolasa Buitrago PharmD and I am your dedicated Digital Medicine clinician.  As an expert in medication management, I will help ensure that the medications you are taking continue to provide you with the intended benefits.      I am Pallavi Crain and I will be your health  for the duration of the program.  My  job is to help you identify lifestyle changes to improve your blood pressure control.  We will talk about nutrition, exercise, and other ways that you may be able to adjust your current habits to better your health. Together, we will work to improve your overall health and encourage you to meet your goals for a healthier lifestyle.    What we expect from YOU:    You will need to take blood pressure readings multiple times a week and no less than one reading per week.   It is important that you take your measurements at different times during the day, when possible.     What you should expect from your Digital Medicine Care Team:   We will provide you with education about high blood pressure, including lifestyle changes that could help you to control your blood pressure.   We will review your weekly readings and provide you with monthly blood pressure progress reports after you have been in the program for more than 30 days.   We will send monthly progress reports on your blood pressure control to your physician so they can follow along with your progress as well.    You will be able to reach me by phone at [phone] or through your [facility] account by clicking my name under Care Team on the right side of the home screen.    I look forward to working with you to achieve your blood pressure goals!    Sincerely,    Nicolasa Buitrago PharmD  Your personal clinician    Please visit  [email or web address] to learn more about high blood pressure and what you can do lower your blood pressure.                                                                                           Greg Garcia  [address] [facility] [address]

## 2017-08-04 NOTE — TELEPHONE ENCOUNTER
HTN Digital Medicine Program Medication Reconciliation Outreach    Called patient to introduce him into the HDMP. Bright and talkative, patient was able to complete enrollment without difficulty. Reviewed program details including blood pressure goals, technique for taking readings (timing, cuff placement, body positioning, iHealth hawa), and what to do in case of emergency. Introduced patient to members of care team (health , clinician, and responsible provider). Verified patient's understanding of [facility] My[facility] hawa use for contacting clinical team and to ensure that iHealth cuff readings continue to transmit by logging in once every 2 weeks. Confirmation text sent and patient received.    Screening Questionnaire Review:  1. Depression - yes  2. Sleep apnea - yes     Depression screening results suggest patient is having depressive symptoms. Denies SI/HI. Mr. Garcia is followed by a psychologist and counselor, states Prozac and Wellbutrin are managing his depression well. Patient reports increased anxiety due to life stressors, he is actively working with his care team to manage anxiety.    JOHN screening results for this patient suggest a high likelihood of sleep apnea, which can contribute to hypertension. Patient has been previously diagnosed with sleep apnea and is managed by Dr. Rod at [facility] [place] / pulmonology. Patient reports the following symptoms of sleep apnea: snoring, tossing and turning, excessive daytime sleepiness, sinus problems, congested nose, difficulty falling asleep once awakened. Patient reports consistent CPAP use at least 7 hours per night. JOHN is not managed effectively with CPAP use. Mr. Garcia reports wearing a full face mask CPAP. He is having issues with air leaks as the mask does not fit properly and they recently increased the pressure. He is working with Dr. Rod to find a solution.      Verified the following information with the patient:  1. Medication  list  Current Outpatient Prescriptions on File Prior to Visit   Medication Sig Dispense Refill    albuterol (PROVENTIL) 5 mg/mL nebulizer solution Take 2.5 mg by nebulization every 6 (six) hours as needed for Wheezing.      ALCORTIN A 2-1-1 % GlPk AAA bid 48 g 5    artificial tears ointment (REFRESH P.M.) Oint every evening.      azelastine (ASTELIN) 137 mcg (0.1 %) nasal spray USE ONE SPRAY(S) IN EACH NOSTRIL TWICE DAILY 30 mL 11    buPROPion (WELLBUTRIN XL) 150 MG TB24 tablet       cetirizine-pseudoephedrine 5-120 mg Tb12 Take 5 mg by mouth 2 (two) times daily. (Patient taking differently: Take 5 mg by mouth once daily. ) 48 tablet 2    cholecalciferol, vitamin D3, (VITAMIN D3) 5,000 unit Tab Take 5,000 Units by mouth once daily.      fluoxetine (PROZAC) 40 MG capsule Take 1 capsule by mouth once daily at 6am.      fluticasone (FLONASE) 50 mcg/actuation nasal spray 2 sprays by Each Nare route once daily. 16 g 11    fluticasone-salmeterol 250-50 mcg/dose (ADVAIR DISKUS) 250-50 mcg/dose diskus inhaler Inhale 1 puff into the lungs as needed. 180 each 3    glucosamine-chondroitin 500-400 mg tablet Take 1 tablet by mouth 2 (two) times daily.      ibuprofen (ADVIL,MOTRIN) 200 MG tablet Take 200 mg by mouth 2 (two) times daily. 4 tab twice daily      lisinopril (PRINIVIL,ZESTRIL) 20 MG tablet Take 1 tablet (20 mg total) by mouth once daily. 30 tablet 11    montelukast (SINGULAIR) 10 mg tablet Take 10 mg by mouth once daily.      multivitamin (ONE DAILY MULTIVITAMIN) per tablet Take 1 tablet by mouth once daily.      omeprazole (PRILOSEC OTC) 20 MG tablet Take 20 mg by mouth once daily.       No current facility-administered medications on file prior to visit.        2. Medication compliance: has been compliant with the medicaiton regimen    3. Medication Allergies: Review of patient's allergies indicates: No Known Allergies    Explained that our goal is to get his BP consistently below 140/90mmHg. Patient  denies having further questions, concerns. BP is not at goal.       Last 5 Patient Entered [facility] Current 30 Day Average: 154/94     Recent Readings 8/3/2017 8/2/2017 7/31/2017 7/31/2017 7/31/2017    Systolic BP (mmHg) 141 151 144 150 162    Diastolic BP (mmHg) 93 92 89 88 107    Pulse 79 76 86 82 71

## 2017-08-07 ENCOUNTER — PATIENT OUTREACH (OUTPATIENT)
Dept: OTHER | Facility: OTHER | Age: 37
End: 2017-08-07

## 2017-08-07 NOTE — PROGRESS NOTES
Last 5 Patient Entered [facility] Current 30 Day Average: 153/93     Recent Readings 8/6/2017 8/5/2017 8/5/2017 8/5/2017 8/3/2017    Systolic BP (mmHg) 151 146 150 153 141    Diastolic BP (mmHg) 89 91 88 88 93    Pulse 84 83 75 75 79        Breakfast: sugary cereal, milk, OJ  Lunch: sandwich, chips, mac and cheese  Dinner: iceberg salad, cheesy dressing, leahy bits  Snacks: drinks a lot of Coke, he has so far reduced to 2 per day and low calorie versions    Read nutrition labels:   Eating out v cooking: eats out frequently  Water intake: can be improved    We discussed DASH Diet a length, easy food swaps. Goals: start reading labels for sodium intake, increase nonstarchy veggies at each meal, increase protein at each meal.    Physical activity/likes: used to bike a lot outside and initially lost 40 pounds last fall before heat and depression set in. We set goal to increase step count until weather and mood improved.    Med Adherence:    Stress level:  Smoking:  Alcohol:    Goal weight:  Other goals:  Motivation to change: 1 - 10

## 2017-08-10 RX ORDER — MONTELUKAST SODIUM 10 MG/1
TABLET ORAL
Qty: 30 TABLET | Refills: 0 | Status: SHIPPED | OUTPATIENT
Start: 2017-08-10 | End: 2018-01-24 | Stop reason: SDUPTHER

## 2017-09-08 ENCOUNTER — PATIENT OUTREACH (OUTPATIENT)
Dept: OTHER | Facility: OTHER | Age: 37
End: 2017-09-08

## 2017-09-08 NOTE — PROGRESS NOTES
Last 5 Patient Entered [facility] Current 30 Day Average: 146/88     Recent Readings 9/8/2017 9/5/2017 9/5/2017 9/4/2017 9/4/2017    Systolic BP (mmHg) 150 151 152 145 156    Diastolic BP (mmHg) 79 90 94 89 93    Pulse 68 73 72 85 86        Hypertension Digital Medicine Program (HDMP): Health  Follow Up    Lifestyle Modifications:    1. Low sodium diet: no- has julien challenging, but he is working on it. We discussed easy pre-prep meal ideas to avoid frozen dinners and take out.     2.Physical activity: yes - starting to bike again and take advantage of the cooler weather.     3.Hypotension/Hypertension symptoms: no   Frequency/Alleviating factors/Precipitating factors, etc.     4. Patient has been compliant with the medicaiton regimen    Follow up with Mr. Greg Garcia completed. We discussed the importance of proper technique again when taking BP because he states often the first reading he takes is when cuff is not on properly. He will be more careful in the future.  No further questions or concerns. I will follow up in a few weeks to assess progress.

## 2017-09-08 NOTE — PROGRESS NOTES
Last 5 Patient Entered Redings Current 30 Day Average: 146/88     Recent Readings 9/5/2017 9/5/2017 9/4/2017 9/4/2017 9/4/2017    Systolic BP (mmHg) 151 152 145 156 156    Diastolic BP (mmHg) 90 94 89 93 97    Pulse 73 72 85 86 96        Hypertension Digital Medicine (HDMP) Health  Follow Up    LVM to follow up with Mr. Greg Garcia.    Would like to discuss adherence to low sodium diet and physical activity guidelines. Requested call back, otherwise will try again next week.

## 2017-09-26 ENCOUNTER — PATIENT OUTREACH (OUTPATIENT)
Dept: OTHER | Facility: OTHER | Age: 37
End: 2017-09-26

## 2017-09-26 DIAGNOSIS — I10 ESSENTIAL HYPERTENSION: Primary | ICD-10-CM

## 2017-09-26 NOTE — PROGRESS NOTES
Last 5 Patient Entered [employer] Current 30 Day Average: 147/90     Recent Readings 9/24/2017 9/22/2017 9/20/2017 9/19/2017 9/19/2017    Systolic BP (mmHg) 149 141 136 168 158    Diastolic BP (mmHg) 88 85 81 105 105    Pulse 82 75 71 79 77        Called Mr. Garcia to introduce him to the Hypertension Digital Medicine Program.     Mr. Garcia BP is above goal. He feels part of his elevated BP is due to medications (Advair, Flonase, and Wellbutrin). He is working on trying to follow a low sodium diet. He also rides a bicycle but is sometimes limited by his asthma. Will increase lisinopril form 20 mg QD to 40 mg QD today. He said he did notice a decrease in his BP when lisinopril was increased from 10 mg to 20 mg QD. Will follow up in 2 weeks to assess how BP is responding to dose increase.     Reviewed patient's medications and verified allergies on file.   Hypertension Medications             lisinopril (PRINIVIL,ZESTRIL) 40 MG tablet Take 1 tablet (40 mg total) by mouth once daily.        Explained that we expect him to obtain several blood pressures/week at random times of day. Also asked that the BP be taken at least 1 hour after taking BP medications.     Explained that our goal is to get his BP to consistently below 140/90mmHg.     Patient and I agreed that the patient will take his BP daily to every other day at varying times of the day.     Emailed patient link to [facility]'s HTN webpage as well as my direct phone number in case he has any questions.

## 2017-10-06 ENCOUNTER — PATIENT OUTREACH (OUTPATIENT)
Dept: OTHER | Facility: OTHER | Age: 37
End: 2017-10-06

## 2017-10-06 RX ORDER — FLUTICASONE PROPIONATE AND SALMETEROL 250; 50 UG/1; UG/1
1 POWDER RESPIRATORY (INHALATION)
Qty: 180 EACH | Refills: 3 | Status: SHIPPED | OUTPATIENT
Start: 2017-10-06 | End: 2018-07-17 | Stop reason: SDUPTHER

## 2017-10-06 RX ORDER — LISINOPRIL 40 MG/1
40 TABLET ORAL DAILY
Qty: 30 TABLET | Refills: 3 | Status: SHIPPED | OUTPATIENT
Start: 2017-10-06 | End: 2018-01-24

## 2017-10-06 NOTE — PROGRESS NOTES
Last 5 Patient Entered [facility]ings Current 30 Day Average: 148/90     Recent Readings 10/3/2017 10/3/2017 10/1/2017 9/29/2017 9/28/2017    Systolic BP (mmHg) 147 146 138 147 157    Diastolic BP (mmHg) 90 98 76 84 97    Pulse 75 76 79 66 76        Hypertension Digital Medicine (HDMP) Health  Follow Up    LVM to follow up with Mr. Greg Garcia.    Per 30 day average, blood pressure is not well controlled 148/90 mmHg. Encouraged adherence to low sodium diet and physical activity guidelines. Requested call back to discuss lifestyle progress and let him know his PharmD has also been trying to reach him. WCB in 1 week.

## 2017-10-13 NOTE — PROGRESS NOTES
Last 5 Patient Entered [facility]ings Current 30 Day Average: 148/90     Recent Readings 10/12/2017 10/12/2017 10/12/2017 10/9/2017 10/9/2017    Systolic BP (mmHg) 143 140 142 140 150    Diastolic BP (mmHg) 92 90 83 89 101    Pulse 67 81 78 65 71        LVM again for Mr. Garcia in attempt to follow up. Will try again next week if no response.

## 2017-10-17 NOTE — PROGRESS NOTES
Last 5 Patient Entered [facility] Current 30 Day Average: 146/89     Recent Readings 10/15/2017 10/14/2017 10/12/2017 10/12/2017 10/12/2017    Systolic BP (mmHg) 135 140 143 140 142    Diastolic BP (mmHg) 86 80 92 90 83    Pulse 75 77 67 81 78        Hypertension Digital Medicine Program (HDMP): Health  Follow Up    Lifestyle Modifications:    1.Low sodium diet: yes - started reading food labels and trying to choose low sodium products whenever possible. He admits his weakness is still adding leahy bits to his salads but he has been trying to add less.     2.Physical activity: yes - has started biking again outdoors now that the weather is cooler.     3.Hypotension/Hypertension symptoms: no - states he is tolerating med change well.   Frequency/Alleviating factors/Precipitating factors, etc.     4.Patient has been compliant with the medication regimen.     Follow up with Mr. Greg Garcia completed. No further questions or concerns. I will follow up in a few weeks to assess progress.

## 2017-10-26 ENCOUNTER — PATIENT OUTREACH (OUTPATIENT)
Dept: OTHER | Facility: OTHER | Age: 37
End: 2017-10-26

## 2017-10-26 NOTE — PROGRESS NOTES
Last 5 Patient Entered Readings Current 30 Day Average: 146/89     Recent Readings 10/27/2017 10/25/2017 10/22/2017 10/17/2017 10/15/2017    Systolic BP (mmHg) 142 153 157 153 135    Diastolic BP (mmHg) 93 83 93 94 86    Pulse 70 69 70 69 75        Mr. Garcia's BP average is slowly improving. He is not ready to add another medication to his regimen. He would like to work more on reducing his salt intake. Will monitor readings for 1 month and if his average remains above goal, will discuss adding chlorthalidone.     Current HTN regimen:  Hypertension Medications             lisinopril (PRINIVIL,ZESTRIL) 40 MG tablet Take 1 tablet (40 mg total) by mouth once daily.        Will continue to monitor regularly. Will follow up in 4-6 weeks, sooner if BP begins to trend upward or downward.    Patient has my contact information and knows to call with any concerns or clinical changes.

## 2017-11-17 ENCOUNTER — PATIENT OUTREACH (OUTPATIENT)
Dept: OTHER | Facility: OTHER | Age: 37
End: 2017-11-17

## 2017-11-17 NOTE — PROGRESS NOTES
Last 5 Patient Entered Readings Current 30 Day Average: 155/94     Recent Readings 11/11/2017 11/4/2017 11/4/2017 11/4/2017 11/4/2017    Systolic BP (mmHg) 148 180 158 152 167    Diastolic BP (mmHg) 93 107 104 100 98    Pulse 76 63 69 65 61        Hypertension Digital Medicine (HDMP) Health  Follow Up    LVM to follow up with Mr. Greg Garcia.    Per 30 day average, blood pressure is not well controlled 155/94 mmHg. Requested patient call back to discuss elevated readings, otherwise will try again next week.

## 2017-11-21 NOTE — PROGRESS NOTES
Last 5 Patient Entered Readings Current 30 Day Average: 153/95     Recent Readings 11/19/2017 11/11/2017 11/4/2017 11/4/2017 11/4/2017    Systolic BP (mmHg) 141 148 180 158 152    Diastolic BP (mmHg) 100 93 107 104 100    Pulse 64 76 63 69 65        Hypertension Digital Medicine Program (HDMP): Health  Follow Up    Lifestyle Modifications:    1.Low sodium diet: yes - reading labels, avoiding fast food, trying to make more meals at home to really watch sodium intake.     2.Physical activity: yes - states since the weather has been cooler he has been walking more outside and is now going to start biking again regularly.     3.Hypotension/Hypertension symptoms: no   Frequency/Alleviating factors/Precipitating factors, etc.     4.Patient has been compliant with the medication regimen.     Follow up with Mr. Greg Garcia completed. No further questions or concerns. I will follow up in a few weeks to assess progress.

## 2017-12-01 ENCOUNTER — PATIENT OUTREACH (OUTPATIENT)
Dept: OTHER | Facility: OTHER | Age: 37
End: 2017-12-01

## 2017-12-01 DIAGNOSIS — I10 ESSENTIAL HYPERTENSION: Primary | ICD-10-CM

## 2017-12-01 RX ORDER — CHLORTHALIDONE 25 MG/1
TABLET ORAL
Qty: 30 TABLET | Refills: 3 | Status: SHIPPED | OUTPATIENT
Start: 2017-12-01 | End: 2017-12-13

## 2017-12-01 NOTE — PROGRESS NOTES
Last 5 Patient Entered Readings Current 30 Day Average: 155/98     Recent Readings 11/30/2017 11/24/2017 11/24/2017 11/24/2017 11/19/2017    Systolic BP (mmHg) 152 154 164 163 141    Diastolic BP (mmHg) 92 103 102 96 100    Pulse 80 82 80 80 64        Patient's BP average is not controlled. Reviewed 2017 ACC/AHA HTN guidelines with patient and explained new BP goal is <130/80.     Patient denies s/s of hypertension (SOB, CP, severe headaches, changes in vision) associated with high readings. Instructed patient to go to the ED if BP > 180/110 and accompanied by hypertensive s/s, patient confirms understanding.    Mr. Mckinney is concerned about elevated BP readings. He continues to monitor and reduce salt intake. He has been walking more often. Will add chlorthalidone 12.5 mg QD and check BMP on 12/13 when he goes to the clinic for PCP appointment. Encouraged him to drink 1.5-2L of fluids daily.    Current HTN regimen:  Hypertension Medications             chlorthalidone (HYGROTEN) 25 MG Tab Take 1/2 tablet (12.5 mg) by mouth once daily.    lisinopril (PRINIVIL,ZESTRIL) 40 MG tablet Take 1 tablet (40 mg total) by mouth once daily.        Will continue to monitor regularly. Will follow up in 2-3 weeks, sooner if BP begins to trend upward or downward.    Patient has my contact information and knows to call with any concerns or clinical changes.

## 2017-12-13 ENCOUNTER — PATIENT MESSAGE (OUTPATIENT)
Dept: OTHER | Facility: OTHER | Age: 37
End: 2017-12-13

## 2017-12-13 ENCOUNTER — OFFICE VISIT (OUTPATIENT)
Dept: FAMILY MEDICINE | Facility: CLINIC | Age: 37
End: 2017-12-13
Payer: COMMERCIAL

## 2017-12-13 ENCOUNTER — LAB VISIT (OUTPATIENT)
Dept: LAB | Facility: HOSPITAL | Age: 37
End: 2017-12-13
Attending: FAMILY MEDICINE
Payer: COMMERCIAL

## 2017-12-13 VITALS
TEMPERATURE: 99 F | HEIGHT: 72 IN | BODY MASS INDEX: 42.66 KG/M2 | HEART RATE: 67 BPM | WEIGHT: 315 LBS | RESPIRATION RATE: 16 BRPM | DIASTOLIC BLOOD PRESSURE: 90 MMHG | SYSTOLIC BLOOD PRESSURE: 130 MMHG | OXYGEN SATURATION: 96 %

## 2017-12-13 DIAGNOSIS — R23.1 CLAMMY SKIN: ICD-10-CM

## 2017-12-13 DIAGNOSIS — R55 VASOVAGAL EPISODE: Primary | ICD-10-CM

## 2017-12-13 DIAGNOSIS — I10 ESSENTIAL HYPERTENSION: ICD-10-CM

## 2017-12-13 DIAGNOSIS — F33.42 RECURRENT MAJOR DEPRESSIVE DISORDER, IN FULL REMISSION: ICD-10-CM

## 2017-12-13 LAB
ANION GAP SERPL CALC-SCNC: 9 MMOL/L
BUN SERPL-MCNC: 12 MG/DL
CALCIUM SERPL-MCNC: 9.9 MG/DL
CHLORIDE SERPL-SCNC: 101 MMOL/L
CO2 SERPL-SCNC: 29 MMOL/L
CREAT SERPL-MCNC: 1 MG/DL
EST. GFR  (AFRICAN AMERICAN): >60 ML/MIN/1.73 M^2
EST. GFR  (NON AFRICAN AMERICAN): >60 ML/MIN/1.73 M^2
GLUCOSE SERPL-MCNC: 103 MG/DL
POTASSIUM SERPL-SCNC: 3.6 MMOL/L
SODIUM SERPL-SCNC: 139 MMOL/L

## 2017-12-13 PROCEDURE — 36415 COLL VENOUS BLD VENIPUNCTURE: CPT | Mod: PO

## 2017-12-13 PROCEDURE — 99999 PR PBB SHADOW E&M-EST. PATIENT-LVL III: CPT | Mod: PBBFAC,,, | Performed by: FAMILY MEDICINE

## 2017-12-13 PROCEDURE — 80048 BASIC METABOLIC PNL TOTAL CA: CPT

## 2017-12-13 PROCEDURE — 99214 OFFICE O/P EST MOD 30 MIN: CPT | Mod: S$GLB,,, | Performed by: FAMILY MEDICINE

## 2017-12-13 RX ORDER — METOPROLOL TARTRATE 25 MG/1
25 TABLET, FILM COATED ORAL 2 TIMES DAILY
Qty: 60 TABLET | Refills: 11 | Status: SHIPPED | OUTPATIENT
Start: 2017-12-13 | End: 2018-01-19 | Stop reason: ALTCHOICE

## 2017-12-14 ENCOUNTER — PATIENT OUTREACH (OUTPATIENT)
Dept: OTHER | Facility: OTHER | Age: 37
End: 2017-12-14

## 2017-12-14 NOTE — PROGRESS NOTES
Last 5 Patient Entered Readings Current 30 Day Average: 141/92       Units 12/15/2017 12/15/2017 12/12/2017 12/10/2017 12/10/2017    Time - 12:45 AM 12:44 AM 11:21 PM  8:45 PM  8:44 PM    Systolic Blood Pressure - 131 134 131 128 158    Diastolic Blood Pressure - 81 86 90 89 100    Pulse bpm 62 63 78 96 100        Patient's BP average is above goal of <130/80.     Mr. Garcia was not feeling well on chlorthalidone. He had a fainting episode the day he had labs done. Dr. Terrell discontinued chlorthalidone as a result and started metoprolol tartrate 25 mg BID. He is tolerating this well so far.     Will continue to monitor regularly. Will follow up in 4-6 weeks, sooner if BP begins to trend upward or downward.    Patient has my contact information and knows to call with any concerns or clinical changes.     Current HTN regimen:  Hypertension Medications             lisinopril (PRINIVIL,ZESTRIL) 40 MG tablet Take 1 tablet (40 mg total) by mouth once daily.    metoprolol tartrate (LOPRESSOR) 25 MG tablet Take 1 tablet (25 mg total) by mouth 2 (two) times daily.

## 2017-12-14 NOTE — PROGRESS NOTES
"Last 5 Patient Entered Readings Current 30 Day Average: 142/94       Units 12/12/2017 12/10/2017 12/10/2017 12/9/2017 12/9/2017    Time - 11:21 PM  8:45 PM  8:44 PM  7:11 PM  7:10 PM    Systolic Blood Pressure - 131 128 158 131 138    Diastolic Blood Pressure - 90 89 100 85 87    Pulse bpm 78 96 100 81 81        Hypertension Digital Medicine Program (HDMP): Health  Follow Up    Lifestyle Modifications:    1.Low sodium diet: no - admits after a fainting episode yesterday at the lab, he ate a fast food burger to "make myself feel better". We discussed not letting momentary indiscretions turn in to a string of poor eating decisions and he confirmed that he immediately "got back on track" w/the healthier choices he has been trying to implement. He also switched from regular Coke to the lower-calorie "Coke Life" and is trying to drink more water (although he currently flavors it w/a stevia packet for sweetness). We discussed progressively diluting his sweetened beverages to increase the water ratio & lower calories. We also discussed focusing on including more protein & vegetables in his diet and not keeping tempting "treats" he's rather avoid out of the house.   He states the high reading from 12/10 was d/t improper placement of BP cuff.     2.Physical activity: no - has not been as active lately d/t back pain but plans on easing back into biking routine after the cold front passes.     3.Hypotension/Hypertension symptoms: yes - his doctor recently switched his chorthalidone to lopressor and he will begin that today. He will continue to take lisonopril.    Frequency/Alleviating factors/Precipitating factors, etc.     4.Patient has been compliant with the medication regimen.     Follow up with Mr. Greg Voraby completed. No further questions or concerns. I will follow up in a few weeks to assess progress.       "

## 2018-01-16 ENCOUNTER — PATIENT OUTREACH (OUTPATIENT)
Dept: OTHER | Facility: OTHER | Age: 38
End: 2018-01-16

## 2018-01-16 DIAGNOSIS — I10 ESSENTIAL HYPERTENSION: Primary | ICD-10-CM

## 2018-01-16 NOTE — PROGRESS NOTES
Last 5 Patient Entered Readings                                      Current 30 Day Average: 149/94     Recent Readings 1/16/2018 1/12/2018 1/7/2018 12/27/2017 12/27/2017    SBP (mmHg) 146 146 152 155 154    DBP (mmHg) 93 94 98 93 92    Pulse 63 68 61 61 63        Patient's BP average is above goal of <130/80.     Mr. Garcia is feeling well. He has been having trouble getting BP at goal. Will change metoprolol tartrate to carvedilol. He has also had trouble getting BP cuff to connect to his phone/ipad. Advised him on some troubleshooting tips and asked that he call back if this doesn't help and we will have IT support assist him.     Patient denies s/s of hypotension (lightheadedness, dizziness, nausea, fatigue) associated with low readings. Instructed patient to inform me if this occurs, patient confirms understanding.      Patient denies s/s of hypertension (SOB, CP, severe headaches, changes in vision) associated with high readings. Instructed patient to go to the ED if BP > 180/110 and accompanied by hypertensive s/s, patient confirms understanding.    Will continue to monitor regularly. Will follow up in 2-3 weeks, sooner if BP begins to trend upward or downward.    Patient has my contact information and knows to call with any concerns or clinical changes.     Current HTN regimen:  Hypertension Medications             carvedilol (COREG) 12.5 MG tablet Take 1 tablet (12.5 mg total) by mouth 2 (two) times daily with meals.    lisinopril (PRINIVIL,ZESTRIL) 40 MG tablet Take 1 tablet (40 mg total) by mouth once daily.

## 2018-01-19 RX ORDER — CARVEDILOL 12.5 MG/1
12.5 TABLET ORAL 2 TIMES DAILY WITH MEALS
Qty: 60 TABLET | Refills: 3 | Status: SHIPPED | OUTPATIENT
Start: 2018-01-19 | End: 2018-04-24

## 2018-01-24 ENCOUNTER — OFFICE VISIT (OUTPATIENT)
Dept: FAMILY MEDICINE | Facility: CLINIC | Age: 38
End: 2018-01-24
Payer: COMMERCIAL

## 2018-01-24 VITALS
DIASTOLIC BLOOD PRESSURE: 82 MMHG | OXYGEN SATURATION: 97 % | WEIGHT: 315 LBS | HEIGHT: 72 IN | SYSTOLIC BLOOD PRESSURE: 122 MMHG | HEART RATE: 70 BPM | BODY MASS INDEX: 42.66 KG/M2

## 2018-01-24 DIAGNOSIS — J30.1 NON-SEASONAL ALLERGIC RHINITIS DUE TO POLLEN, UNSPECIFIED CHRONICITY: ICD-10-CM

## 2018-01-24 DIAGNOSIS — F33.42 RECURRENT MAJOR DEPRESSIVE DISORDER, IN FULL REMISSION: Primary | ICD-10-CM

## 2018-01-24 DIAGNOSIS — I10 ESSENTIAL HYPERTENSION: ICD-10-CM

## 2018-01-24 DIAGNOSIS — J45.20 MILD INTERMITTENT ASTHMA WITHOUT COMPLICATION: ICD-10-CM

## 2018-01-24 PROCEDURE — 99999 PR PBB SHADOW E&M-EST. PATIENT-LVL III: CPT | Mod: PBBFAC,,, | Performed by: FAMILY MEDICINE

## 2018-01-24 PROCEDURE — 99214 OFFICE O/P EST MOD 30 MIN: CPT | Mod: S$GLB,,, | Performed by: FAMILY MEDICINE

## 2018-01-24 RX ORDER — MONTELUKAST SODIUM 10 MG/1
10 TABLET ORAL DAILY
Qty: 30 TABLET | Refills: 11 | Status: SHIPPED | OUTPATIENT
Start: 2018-01-24 | End: 2018-12-29 | Stop reason: SDUPTHER

## 2018-01-24 RX ORDER — LISINOPRIL 40 MG/1
40 TABLET ORAL DAILY
Qty: 30 TABLET | Refills: 11
Start: 2018-01-24 | End: 2018-02-15 | Stop reason: SDUPTHER

## 2018-01-24 NOTE — PROGRESS NOTES
Subjective:       Patient ID: Greg Garcia is a 37 y.o. male.    Chief Complaint: Hypertension    HPI     htn controlled.    Depression controlled.  Seeing psychotherapist twice a week.     Asthma controlled. Rarely using ventolin.      Requesting to get back on singulair. Reports more nasal congestion over the past 8 weeks.        Review of Systems   Respiratory: Negative for shortness of breath.    Cardiovascular: Negative for chest pain and palpitations.   Musculoskeletal: Negative for neck pain.   Neurological: Negative for headaches.       Objective:      Physical Exam   HENT:   Head: Atraumatic.   Eyes: Conjunctivae are normal. Pupils are equal, round, and reactive to light.   Neck: Normal range of motion.   Cardiovascular: Normal rate and regular rhythm.    No murmur heard.  Pulmonary/Chest: Effort normal and breath sounds normal. He has no wheezes.   Lymphadenopathy:     He has no cervical adenopathy.       Assessment:       1. Recurrent major depressive disorder, in full remission    2. Essential hypertension    3. Mild intermittent asthma without complication    4. Non-seasonal allergic rhinitis due to pollen, unspecified chronicity        Plan:       Recurrent major depressive disorder, in full remission    Essential hypertension  -     lisinopril (PRINIVIL,ZESTRIL) 40 MG tablet; Take 1 tablet (40 mg total) by mouth once daily.  Dispense: 30 tablet; Refill: 11    Mild intermittent asthma without complication    Non-seasonal allergic rhinitis due to pollen, unspecified chronicity    Other orders  -     montelukast (SINGULAIR) 10 mg tablet; Take 1 tablet (10 mg total) by mouth once daily.  Dispense: 30 tablet; Refill: 11      Plan:  Restart singulair  Cont all other meds    Medication List with Changes/Refills   Current Medications    ALBUTEROL (PROVENTIL) 5 MG/ML NEBULIZER SOLUTION    Take 2.5 mg by nebulization every 6 (six) hours as needed for Wheezing.    ALCORTIN A 2-1-1 % GLPK    AAA bid     ARTIFICIAL TEARS OINTMENT (REFRESH P.M.) OINT    every evening.    AZELASTINE (ASTELIN) 137 MCG (0.1 %) NASAL SPRAY    USE ONE SPRAY(S) IN EACH NOSTRIL TWICE DAILY    BUPROPION (WELLBUTRIN XL) 150 MG TB24 TABLET    Take 150 mg by mouth once daily.     CARVEDILOL (COREG) 12.5 MG TABLET    Take 1 tablet (12.5 mg total) by mouth 2 (two) times daily with meals.    CETIRIZINE-PSEUDOEPHEDRINE 5-120 MG TB12    Take 5 mg by mouth 2 (two) times daily.    CHOLECALCIFEROL, VITAMIN D3, (VITAMIN D3) 5,000 UNIT TAB    Take 5,000 Units by mouth once daily.    FLUOXETINE (PROZAC) 40 MG CAPSULE    Take 1 capsule by mouth once daily at 6am.    FLUTICASONE (FLONASE) 50 MCG/ACTUATION NASAL SPRAY    2 sprays by Each Nare route once daily.    FLUTICASONE-SALMETEROL 250-50 MCG/DOSE (ADVAIR DISKUS) 250-50 MCG/DOSE DISKUS INHALER    Inhale 1 puff into the lungs as needed.    GLUCOSAMINE-CHONDROITIN 500-400 MG TABLET    Take 1 tablet by mouth 2 (two) times daily.    IBUPROFEN (ADVIL,MOTRIN) 200 MG TABLET    Take 200 mg by mouth 2 (two) times daily. 4 tab twice daily    MULTIVITAMIN (ONE DAILY MULTIVITAMIN) PER TABLET    Take 1 tablet by mouth once daily.    OMEPRAZOLE (PRILOSEC OTC) 20 MG TABLET    Take 20 mg by mouth once daily.   Changed and/or Refilled Medications    Modified Medication Previous Medication    LISINOPRIL (PRINIVIL,ZESTRIL) 40 MG TABLET lisinopril (PRINIVIL,ZESTRIL) 40 MG tablet       Take 1 tablet (40 mg total) by mouth once daily.    Take 1 tablet (40 mg total) by mouth once daily.    MONTELUKAST (SINGULAIR) 10 MG TABLET montelukast (SINGULAIR) 10 mg tablet       Take 1 tablet (10 mg total) by mouth once daily.    TAKE ONE TABLET BY MOUTH ONCE DAILY   Discontinued Medications    MONTELUKAST (SINGULAIR) 10 MG TABLET    Take 10 mg by mouth once daily.

## 2018-02-02 ENCOUNTER — PATIENT OUTREACH (OUTPATIENT)
Dept: OTHER | Facility: OTHER | Age: 38
End: 2018-02-02

## 2018-02-02 NOTE — PROGRESS NOTES
Last 5 Patient Entered Readings                                      Current 30 Day Average: 145/90     Recent Readings 1/30/2018 1/29/2018 1/28/2018 1/23/2018 1/16/2018    SBP (mmHg) 137 147 142 142 146    DBP (mmHg) 81 90 87 88 93    Pulse 79 73 67 71 63        Mr. Garcia's BP remain above goal. His BP at last clinic visit was 122/82. He said he was surprised to see it so well controlled. He has been sitting on the edge of the bed when checking his BP at home. Asked that he sit at a desk or kitchen table to check his BP to ensure readings are accurate readings. If BP remains elevated, will increase carvedilol to 25 mg BID.    Will continue to monitor regularly. Will follow up in 2-3 weeks, sooner if BP begins to trend upward or downward.    Patient has my contact information and knows to call with any concerns or clinical changes.     Current HTN regimen:  Hypertension Medications             carvedilol (COREG) 12.5 MG tablet Take 1 tablet (12.5 mg total) by mouth 2 (two) times daily with meals.    lisinopril (PRINIVIL,ZESTRIL) 40 MG tablet TAKE ONE TABLET BY MOUTH ONCE DAILY

## 2018-02-14 DIAGNOSIS — I10 ESSENTIAL HYPERTENSION: ICD-10-CM

## 2018-02-15 RX ORDER — LISINOPRIL 40 MG/1
TABLET ORAL
Qty: 30 TABLET | Refills: 11 | Status: SHIPPED | OUTPATIENT
Start: 2018-02-15 | End: 2018-11-29

## 2018-02-27 ENCOUNTER — PATIENT OUTREACH (OUTPATIENT)
Dept: OTHER | Facility: OTHER | Age: 38
End: 2018-02-27

## 2018-02-27 NOTE — LETTER
Lexi Singh  [address] [place] [address]     Dear Greg,    Thank you for enrolling in the [facility] Hypertension Digital Medicine Program. To participate in our program, we ask that you submit a blood pressure reading at least once weekly through your [facility] Account and maintain regular contact with your Care Team.  We have not received any data or heard from you in some time.     The Digital Medicine Care Team has attempted to reach you on multiple occasions to determine if you would like to continue participating in the program. While we encourage you to continue participating fully, we understand that circumstances may change.      To continue participating in the program, please contact me at [phone]. If we do not hear back, you will be un-enrolled and your physician will be notified of your decision.    We look forward to hearing from you soon.    Sincerely,     Lexi Singh MA, CHES  [facility]                                                                                                                         Greg Garcia  [address] [facility] [address]

## 2018-02-27 NOTE — LETTER
Nicolasa Buitrago, PharmD  [address] [place] [address]     Dear Greg,    We have made several attempts to encourage your participation in Digital Medicine monitoring. Unfortunately, we have been unsuccessful and this is an official notice that you are no longer enrolled in Hypertension Digital Medicine Program, and thus, we will no longer be managing your hypertension.    Please note this has no impact on your relationship with [facility] or your providers. Going forward, please reach out to your primary care provider with any questions or concerns regarding your health.                         Please contact [phone] if you have any additional questions.     Sincerely,    The [facility] Digital Medicine Team                                                                                                                                            Greg Garcia  [address] [facility] [address]

## 2018-02-27 NOTE — PROGRESS NOTES
Last 5 Patient Entered Readings                                      Current 30 Day Average: 145/80     Recent Readings 5/3/2018 4/26/2018 4/24/2018 4/24/2018 4/15/2018    SBP (mmHg) 150 137 148 132 143    DBP (mmHg) 77 86 73 99 84    Pulse 75 70 66 68 61        Digital Medicine: Health  Follow Up    Left voicemail to follow up with  Greg Janak Garcia X4.  Current BP average 139/74 mmHg is not at goal, <130/80 mmHg.  WCB in 3 weeks.

## 2018-03-12 ENCOUNTER — PATIENT OUTREACH (OUTPATIENT)
Dept: OTHER | Facility: OTHER | Age: 38
End: 2018-03-12

## 2018-03-12 NOTE — PROGRESS NOTES
Last 5 Patient Entered Readings                                      Current 30 Day Average: 145/88     Recent Readings 3/30/2018 3/26/2018 3/23/2018 3/22/2018 3/19/2018    SBP (mmHg) 134 145 141 150 147    DBP (mmHg) 83 93 91 93 88    Pulse 71 56 68 58 65        Patient's BP average is above goal of <130/80.     Mr. aGrcia has not charged his BP monitor recently. Explained this could be causing falsely elevated BP readings. Asked that he charge his monitor before checking BP again. If BP remains elevated after charging his monitor, will increase carvedilol to 25 mg BID.    Will continue to monitor regularly. Will follow up in 2-3 weeks, sooner if BP begins to trend upward or downward.    Patient has my contact information and knows to call with any concerns or clinical changes.     Current HTN regimen:  Hypertension Medications             carvedilol (COREG) 12.5 MG tablet Take 1 tablet (12.5 mg total) by mouth 2 (two) times daily with meals.    lisinopril (PRINIVIL,ZESTRIL) 40 MG tablet TAKE ONE TABLET BY MOUTH ONCE DAILY

## 2018-04-24 ENCOUNTER — OFFICE VISIT (OUTPATIENT)
Dept: FAMILY MEDICINE | Facility: CLINIC | Age: 38
End: 2018-04-24
Payer: COMMERCIAL

## 2018-04-24 VITALS
SYSTOLIC BLOOD PRESSURE: 132 MMHG | OXYGEN SATURATION: 97 % | BODY MASS INDEX: 42.66 KG/M2 | DIASTOLIC BLOOD PRESSURE: 99 MMHG | WEIGHT: 315 LBS | HEART RATE: 68 BPM | HEIGHT: 72 IN

## 2018-04-24 DIAGNOSIS — J45.20 MILD INTERMITTENT ASTHMA WITHOUT COMPLICATION: ICD-10-CM

## 2018-04-24 DIAGNOSIS — I10 UNCONTROLLED HYPERTENSION: Primary | ICD-10-CM

## 2018-04-24 DIAGNOSIS — F33.42 RECURRENT MAJOR DEPRESSIVE DISORDER, IN FULL REMISSION: ICD-10-CM

## 2018-04-24 PROCEDURE — 99214 OFFICE O/P EST MOD 30 MIN: CPT | Mod: S$GLB,,, | Performed by: FAMILY MEDICINE

## 2018-04-24 PROCEDURE — 3080F DIAST BP >= 90 MM HG: CPT | Mod: CPTII,S$GLB,, | Performed by: FAMILY MEDICINE

## 2018-04-24 PROCEDURE — 3075F SYST BP GE 130 - 139MM HG: CPT | Mod: CPTII,S$GLB,, | Performed by: FAMILY MEDICINE

## 2018-04-24 PROCEDURE — 99999 PR PBB SHADOW E&M-EST. PATIENT-LVL III: CPT | Mod: PBBFAC,,, | Performed by: FAMILY MEDICINE

## 2018-04-24 RX ORDER — CARVEDILOL 25 MG/1
25 TABLET ORAL 2 TIMES DAILY WITH MEALS
Qty: 60 TABLET | Refills: 11 | Status: SHIPPED | OUTPATIENT
Start: 2018-04-24 | End: 2019-04-02 | Stop reason: SDUPTHER

## 2018-04-24 RX ORDER — HYDROGEN PEROXIDE 3 %
20 SOLUTION, NON-ORAL MISCELLANEOUS DAILY
COMMUNITY

## 2018-04-24 RX ORDER — BUSPIRONE HYDROCHLORIDE 30 MG/1
30 TABLET ORAL DAILY
COMMUNITY
Start: 2018-04-21 | End: 2018-10-01

## 2018-04-24 NOTE — PROGRESS NOTES
Subjective:       Patient ID: Greg Garcia is a 37 y.o. male.    Chief Complaint: Hypertension    HPI     bp elevated.    Depression and anxiety controlled. Sees a psychotherapist.     Asthma controlled. Rarely uses her albuterol.     Review of Systems      Review of Systems   Constitutional: Negative for fever and chills.   HENT: Negative for hearing loss and neck stiffness.    Eyes: Negative for redness and itching.   Respiratory: Negative for cough and choking.    Cardiovascular: Negative for chest pain and leg swelling.  Abdomen: Negative for abdominal pain and blood in stool.   Genitourinary: Negative for dysuria and flank pain.   Musculoskeletal: Negative for back pain and gait problem.   Neurological: Negative for light-headedness and headaches.   Hematological: Negative for adenopathy.   Psychiatric/Behavioral: Negative for behavioral problems.       Objective:      Physical Exam   HENT:   Head: Atraumatic.   Eyes: Conjunctivae are normal. Pupils are equal, round, and reactive to light.   Neck: Normal range of motion.   Cardiovascular: Normal rate and regular rhythm.    No murmur heard.  Pulmonary/Chest: Effort normal and breath sounds normal. He has no wheezes.   Lymphadenopathy:     He has no cervical adenopathy.       Assessment:       1. Uncontrolled hypertension    2. Recurrent major depressive disorder, in full remission    3. Mild intermittent asthma without complication        Plan:       Uncontrolled hypertension    Recurrent major depressive disorder, in full remission    Mild intermittent asthma without complication    Other orders  -     carvedilol (COREG) 25 MG tablet; Take 1 tablet (25 mg total) by mouth 2 (two) times daily with meals.  Dispense: 60 tablet; Refill: 11              Plan:  Inc coreg to 25 mg po bid. Serial bp checks  Cont all other meds              Medication List with Changes/Refills   New Medications    CARVEDILOL (COREG) 25 MG TABLET    Take 1 tablet (25 mg total)  by mouth 2 (two) times daily with meals.   Current Medications    ALBUTEROL (PROVENTIL) 5 MG/ML NEBULIZER SOLUTION    Take 2.5 mg by nebulization every 6 (six) hours as needed for Wheezing.    ALCORTIN A 2-1-1 % GLPK    AAA bid    ARTIFICIAL TEARS OINTMENT (REFRESH P.M.) OINT    every evening.    AZELASTINE (ASTELIN) 137 MCG (0.1 %) NASAL SPRAY    USE ONE SPRAY(S) IN EACH NOSTRIL TWICE DAILY    BUPROPION (WELLBUTRIN XL) 150 MG TB24 TABLET    Take 150 mg by mouth once daily.     BUSPIRONE (BUSPAR) 30 MG TAB    Take 30 mg by mouth once daily.    CETIRIZINE-PSEUDOEPHEDRINE 5-120 MG TB12    Take 5 mg by mouth 2 (two) times daily.    CHOLECALCIFEROL, VITAMIN D3, (VITAMIN D3) 5,000 UNIT TAB    Take 5,000 Units by mouth once daily.    ESOMEPRAZOLE (NEXIUM) 20 MG CAPSULE    Take 20 mg by mouth once daily.    FLUOXETINE (PROZAC) 40 MG CAPSULE    Take 1 capsule by mouth once daily at 6am.    FLUTICASONE-SALMETEROL 250-50 MCG/DOSE (ADVAIR DISKUS) 250-50 MCG/DOSE DISKUS INHALER    Inhale 1 puff into the lungs as needed.    GLUCOSAMINE-CHONDROITIN 500-400 MG TABLET    Take 1 tablet by mouth 2 (two) times daily.    IBUPROFEN (ADVIL,MOTRIN) 200 MG TABLET    Take 200 mg by mouth 2 (two) times daily. 4 tab twice daily    LISINOPRIL (PRINIVIL,ZESTRIL) 40 MG TABLET    TAKE ONE TABLET BY MOUTH ONCE DAILY    MONTELUKAST (SINGULAIR) 10 MG TABLET    Take 1 tablet (10 mg total) by mouth once daily.    MULTIVITAMIN (ONE DAILY MULTIVITAMIN) PER TABLET    Take 1 tablet by mouth once daily.   Discontinued Medications    CARVEDILOL (COREG) 12.5 MG TABLET    Take 1 tablet (12.5 mg total) by mouth 2 (two) times daily with meals.    FLUTICASONE (FLONASE) 50 MCG/ACTUATION NASAL SPRAY    2 sprays by Each Nare route once daily.    OMEPRAZOLE (PRILOSEC OTC) 20 MG TABLET    Take 20 mg by mouth once daily.

## 2018-05-11 ENCOUNTER — PATIENT OUTREACH (OUTPATIENT)
Dept: OTHER | Facility: OTHER | Age: 38
End: 2018-05-11

## 2018-05-11 NOTE — PROGRESS NOTES
Last 5 Patient Entered Readings                                      Current 30 Day Average: 139/74     Recent Readings 5/15/2018 5/8/2018 5/5/2018 5/3/2018 5/3/2018    SBP (mmHg) 131 136 132 149 150    DBP (mmHg) 70 73 67 74 77    Pulse 55 67 67 56 75        Mr. Garcia's BP readings are improving since carvedilol dose was increased. He has no side effects with current dose. He is pleased to see the improvement in BP. Asked that he check his BP more frequently going forward.    Patient's BP average is above goal of <130/80.     Will continue to monitor regularly. Will follow up in 4-6 weeks, sooner if BP begins to trend upward or downward.    Patient has my contact information and knows to call with any concerns or clinical changes.     Current HTN regimen:  Hypertension Medications             carvedilol (COREG) 25 MG tablet Take 1 tablet (25 mg total) by mouth 2 (two) times daily with meals.    lisinopril (PRINIVIL,ZESTRIL) 40 MG tablet TAKE ONE TABLET BY MOUTH ONCE DAILY

## 2018-05-14 RX ORDER — AZELASTINE 1 MG/ML
SPRAY, METERED NASAL
Qty: 30 ML | Refills: 11 | Status: SHIPPED | OUTPATIENT
Start: 2018-05-14 | End: 2019-12-31

## 2018-05-25 NOTE — PROGRESS NOTES
Last 5 Patient Entered Readings                                      Current 30 Day Average: 140/77     Recent Readings 5/23/2018 5/15/2018 5/8/2018 5/5/2018 5/3/2018    SBP (mmHg) 154 131 136 132 149    DBP (mmHg) 91 70 73 67 74    Pulse 57 55 67 67 56        Digital Medicine: Health  Follow Up    Left voicemail to follow up with Mr. Greg Garcia. 5th attempt.  Current BP average 140/77 mmHg is not at goal, <130/80 mmHg.  Will follow up in 1 week via "[employer]hart.

## 2018-06-01 NOTE — PROGRESS NOTES
Last 5 Patient Entered Readings                                      Current 30 Day Average: 141/76     Recent Readings 5/27/2018 5/23/2018 5/15/2018 5/8/2018 5/5/2018    SBP (mmHg) 141 154 131 136 132    DBP (mmHg) 81 91 70 73 67    Pulse 61 57 55 67 67        Sent e-SENS message requesting patient to call.   Will follow up in 2 weeks.

## 2018-06-18 ENCOUNTER — PATIENT OUTREACH (OUTPATIENT)
Dept: OTHER | Facility: OTHER | Age: 38
End: 2018-06-18

## 2018-06-18 NOTE — PROGRESS NOTES
Last 5 Patient Entered Readings                                      Current 30 Day Average: 146/86     Recent Readings 6/13/2018 5/27/2018 5/23/2018 5/15/2018 5/8/2018    SBP (mmHg) 144 141 154 131 136    DBP (mmHg) 85 81 91 70 73    Pulse 62 61 57 55 67        Mr. Garcia did not maintain contact with [facility] care team, despite multiple attempts. He will be removed from the [facility].

## 2018-07-03 NOTE — PROGRESS NOTES
Last 5 Patient Entered Readings                                      Current 30 Day Average: 140/84     Recent Readings 7/2/2018 7/1/2018 6/30/2018 6/24/2018 6/24/2018    SBP (mmHg) 138 140 144 129 148    DBP (mmHg) 82 80 88 78 85    Pulse 66 62 61 71 62        Digital Medicine: Health  Follow Up    Left voicemail to follow up with Mr. Greg Garcia.  Current BP average 140/84 mmHg is not at goal, <130/80 mmHg.  7th attempt. Will follow up in 1 week.

## 2018-07-10 NOTE — PROGRESS NOTES
Last 5 Patient Entered Readings                                      Current 30 Day Average: 140/82     Recent Readings 7/5/2018 7/2/2018 7/1/2018 6/30/2018 6/24/2018    SBP (mmHg) 138 138 140 144 129    DBP (mmHg) 72 82 80 88 78    Pulse 55 66 62 61 71        Digital [facility]: Health  Follow Up    Left voicemail to follow up with Mr. Greg Garcia.  Current BP average 140/82 mmHg is not at goal, <130/80 mmHg.  Sending noncompliance letter today.   Will follow up in 2 weeks.

## 2018-07-13 RX ORDER — FLUTICASONE PROPIONATE AND SALMETEROL 250; 50 UG/1; UG/1
POWDER RESPIRATORY (INHALATION)
Qty: 180 EACH | Refills: 5 | Status: SHIPPED | OUTPATIENT
Start: 2018-07-13 | End: 2019-02-28 | Stop reason: SDUPTHER

## 2018-07-18 RX ORDER — FLUTICASONE PROPIONATE AND SALMETEROL 50; 250 UG/1; UG/1
POWDER RESPIRATORY (INHALATION)
Qty: 180 EACH | Refills: 3 | Status: SHIPPED | OUTPATIENT
Start: 2018-07-18 | End: 2019-09-26 | Stop reason: SDUPTHER

## 2018-07-24 NOTE — PROGRESS NOTES
Last 5 Patient Entered Readings                                      Current 30 Day Average: 140/82     Recent Readings 7/20/2018 7/12/2018 7/5/2018 7/2/2018 7/1/2018    SBP (mmHg) 146 143 138 138 140    DBP (mmHg) 91 86 72 82 80    Pulse 70 50 55 66 62        Digital Medicine: Health  Follow Up    Left voicemail to follow up with Mr. Greg Garcia.  Current BP average 140/82 mmHg is not at goal, <130/80 mmHg.  Will message enrolling provider to encourage participation.  Sending certified discharge letter in 2 weeks if no response.

## 2018-08-07 NOTE — PROGRESS NOTES
Last 5 Patient Entered Readings                                      Current 30 Day Average: 144/89     Recent Readings 8/6/2018 7/30/2018 7/20/2018 7/12/2018 7/5/2018    SBP (mmHg) 141 144 146 143 138    DBP (mmHg) 91 87 91 86 72    Pulse 69 69 70 50 55        Digital Medicine: Health  Follow Up    Left voicemail to follow up with . Greg Janak Garcia.  Current BP average 144/89 mmHg is not at goal, <130/80 mmHg.  Patient noncompliance continues.  Will send certified discharge letter today.   Will notify enrolling provider at time of discharge.

## 2018-08-30 ENCOUNTER — OFFICE VISIT (OUTPATIENT)
Dept: DERMATOLOGY | Facility: CLINIC | Age: 38
End: 2018-08-30
Payer: COMMERCIAL

## 2018-08-30 DIAGNOSIS — L30.4 INTERTRIGO: Primary | ICD-10-CM

## 2018-08-30 PROCEDURE — 99999 PR PBB SHADOW E&M-EST. PATIENT-LVL II: CPT | Mod: PBBFAC,,, | Performed by: DERMATOLOGY

## 2018-08-30 PROCEDURE — 99213 OFFICE O/P EST LOW 20 MIN: CPT | Mod: S$GLB,,, | Performed by: DERMATOLOGY

## 2018-08-30 RX ORDER — KETOCONAZOLE 20 MG/G
CREAM TOPICAL
Qty: 60 G | Refills: 1 | Status: SHIPPED | OUTPATIENT
Start: 2018-08-30 | End: 2019-05-30 | Stop reason: SDUPTHER

## 2018-08-30 NOTE — PROGRESS NOTES
"  Subjective:       Patient ID:  Greg Garcia is a 38 y.o. male who presents for   Chief Complaint   Patient presents with    Recurrent Skin Infections     Follow up     Patient last seen 06/2017 with intertrigo  Had been treated with Po terbinafine x 2 weeks  Lately, vinegar soaks        C/o 6 weeks history of "jock itch"  Given PO lamisil 250 QD x 2 weeks, never fully went away  Some improvement of itch component    Using OTC lamisil cream and gold bond powder      Itching  - Follow-up  Diagnosis: Jock itch.  Symptom course: unchanged  Currently using: finished alcortin gel, white vinegar.  Affected locations: groin  Signs / symptoms: itching and irritated  Severity: moderate        Review of Systems   Constitutional: Negative for fever, chills and fatigue.   Skin: Positive for itching, rash and dry skin.   Hematologic/Lymphatic: Bruises/bleeds easily.        Objective:    Physical Exam   Constitutional: He appears well-developed and well-nourished. No distress.   Neurological: He is alert and oriented to person, place, and time. He is not disoriented.   Psychiatric: He has a normal mood and affect.   Skin:   Areas Examined (abnormalities noted in diagram):   Genitals / Buttocks / Groin Inspection Performed              Diagram Legend     Erythematous scaling macule/papule c/w actinic keratosis       Vascular papule c/w angioma      Pigmented verrucoid papule/plaque c/w seborrheic keratosis      Yellow umbilicated papule c/w sebaceous hyperplasia      Irregularly shaped tan macule c/w lentigo     1-2 mm smooth white papules consistent with Milia      Movable subcutaneous cyst with punctum c/w epidermal inclusion cyst      Subcutaneous movable cyst c/w pilar cyst      Firm pink to brown papule c/w dermatofibroma      Pedunculated fleshy papule(s) c/w skin tag(s)      Evenly pigmented macule c/w junctional nevus     Mildly variegated pigmented, slightly irregular-bordered macule c/w mildly atypical nevus "      Flesh colored to evenly pigmented papule c/w intradermal nevus       Pink pearly papule/plaque c/w basal cell carcinoma      Erythematous hyperkeratotic cursted plaque c/w SCC      Surgical scar with no sign of skin cancer recurrence      Open and closed comedones      Inflammatory papules and pustules      Verrucoid papule consistent consistent with wart     Erythematous eczematous patches and plaques     Dystrophic onycholytic nail with subungual debris c/w onychomycosis     Umbilicated papule    Erythematous-base heme-crusted tan verrucoid plaque consistent with inflamed seborrheic keratosis     Erythematous Silvery Scaling Plaque c/w Psoriasis     See annotation      Assessment / Plan:        Intertrigo    clear today  Continue preventative measures, vinegar spray, zeasorb AF, cool hair dryer  Zinc oxide + keto cream + HC 1% cream PRN flare         No Follow-up on file.

## 2018-10-01 ENCOUNTER — OFFICE VISIT (OUTPATIENT)
Dept: FAMILY MEDICINE | Facility: CLINIC | Age: 38
End: 2018-10-01
Payer: COMMERCIAL

## 2018-10-01 VITALS
HEART RATE: 93 BPM | SYSTOLIC BLOOD PRESSURE: 132 MMHG | OXYGEN SATURATION: 98 % | WEIGHT: 315 LBS | BODY MASS INDEX: 42.66 KG/M2 | HEIGHT: 72 IN | DIASTOLIC BLOOD PRESSURE: 88 MMHG

## 2018-10-01 DIAGNOSIS — F41.9 ANXIETY: ICD-10-CM

## 2018-10-01 DIAGNOSIS — E66.01 MORBID OBESITY WITH BMI OF 50.0-59.9, ADULT: ICD-10-CM

## 2018-10-01 DIAGNOSIS — J45.20 MILD INTERMITTENT ASTHMA WITHOUT COMPLICATION: ICD-10-CM

## 2018-10-01 DIAGNOSIS — I10 ESSENTIAL HYPERTENSION: Primary | ICD-10-CM

## 2018-10-01 PROCEDURE — 3008F BODY MASS INDEX DOCD: CPT | Mod: CPTII,S$GLB,, | Performed by: FAMILY MEDICINE

## 2018-10-01 PROCEDURE — 99214 OFFICE O/P EST MOD 30 MIN: CPT | Mod: 25,S$GLB,, | Performed by: FAMILY MEDICINE

## 2018-10-01 PROCEDURE — 99999 PR PBB SHADOW E&M-EST. PATIENT-LVL III: CPT | Mod: PBBFAC,,, | Performed by: FAMILY MEDICINE

## 2018-10-01 PROCEDURE — 3075F SYST BP GE 130 - 139MM HG: CPT | Mod: CPTII,S$GLB,, | Performed by: FAMILY MEDICINE

## 2018-10-01 PROCEDURE — 90471 IMMUNIZATION ADMIN: CPT | Mod: S$GLB,,, | Performed by: FAMILY MEDICINE

## 2018-10-01 PROCEDURE — 3079F DIAST BP 80-89 MM HG: CPT | Mod: CPTII,S$GLB,, | Performed by: FAMILY MEDICINE

## 2018-10-01 PROCEDURE — 90686 IIV4 VACC NO PRSV 0.5 ML IM: CPT | Mod: S$GLB,,, | Performed by: FAMILY MEDICINE

## 2018-10-01 NOTE — PROGRESS NOTES
Subjective:       Patient ID: Greg Garcia is a 38 y.o. male.    Chief Complaint: Hypertension and Flu Vaccine    HPI       Here for a f/u.    bp elevated today but feeling anxious.  Reports bp at home is better with bp readings 120-130/70-80 range.     Reports worsening anxiety. Seeing Dr. Olivas, medical psychologist, for anxiety management.  Stopped the buspar due to problems with sleep.  Will f/u with psychologist next week.      Asthma controlled.     Review of Systems      Review of Systems   Constitutional: Negative for fever and chills.   HENT: Negative for hearing loss and neck stiffness.    Eyes: Negative for redness and itching.   Respiratory: Negative for cough and choking.    Cardiovascular: Negative for chest pain and leg swelling.  Abdomen: Negative for abdominal pain and blood in stool.   Genitourinary: Negative for dysuria and flank pain.   Musculoskeletal: Negative for back pain and gait problem.   Neurological: Negative for light-headedness and headaches.   Hematological: Negative for adenopathy.       Objective:      Physical Exam   HENT:   Head: Atraumatic.   Eyes: Conjunctivae are normal. Pupils are equal, round, and reactive to light.   Neck: Normal range of motion.   Cardiovascular: Normal rate and regular rhythm.   No murmur heard.  Pulmonary/Chest: Effort normal and breath sounds normal. He has no wheezes.   Lymphadenopathy:     He has no cervical adenopathy.       Assessment:       1. Essential hypertension    2. Mild intermittent asthma without complication    3. Anxiety    4. Morbid obesity with BMI of 50.0-59.9, adult        Plan:       Essential hypertension    Mild intermittent asthma without complication    Anxiety    Morbid obesity with BMI of 50.0-59.9, adult    Other orders  -     Influenza - Quadrivalent (3 years & older) (PF)              Plan:  Cont current meds  F/u with me in 2 months for weight check  F/u with psychologist         Medication List            Accurate as of 10/1/18 10:15 AM. If you have any questions, ask your nurse or doctor.               CHANGE how you take these medications    cetirizine-pseudoephedrine 5-120 mg Tb12  Take 5 mg by mouth 2 (two) times daily.  What changed:  when to take this        CONTINUE taking these medications    albuterol 5 mg/mL nebulizer solution  Commonly known as:  PROVENTIL     artificial tears ointment Oint  Commonly known as:  REFRESH P.M.     azelastine 137 mcg (0.1 %) nasal spray  Commonly known as:  ASTELIN  USE ONE SPRAY IN EACH NOSTRIL TWICE DAILY     buPROPion 150 MG TB24 tablet  Commonly known as:  WELLBUTRIN XL     carvedilol 25 MG tablet  Commonly known as:  COREG  Take 1 tablet (25 mg total) by mouth 2 (two) times daily with meals.     esomeprazole 20 MG capsule  Commonly known as:  NEXIUM     FLUoxetine 40 MG capsule  Commonly known as:  PROZAC     * fluticasone-salmeterol 250-50 mcg/dose 250-50 mcg/dose diskus inhaler  Commonly known as:  ADVAIR DISKUS  INHALE ONE DOSE BY MOUTH TWICE DAILY     * ADVAIR DISKUS 250-50 mcg/dose diskus inhaler  Generic drug:  fluticasone-salmeterol 250-50 mcg/dose  INHALE ONE PUFF INTO LUNGS AS NEEDED     glucosamine-chondroitin 500-400 mg tablet     ibuprofen 200 MG tablet  Commonly known as:  ADVIL,MOTRIN     ketoconazole 2 % cream  Commonly known as:  NIZORAL  Thin film to AA groin daily PRN flare     lisinopril 40 MG tablet  Commonly known as:  PRINIVIL,ZESTRIL  TAKE ONE TABLET BY MOUTH ONCE DAILY     montelukast 10 mg tablet  Commonly known as:  SINGULAIR  Take 1 tablet (10 mg total) by mouth once daily.     ONE DAILY MULTIVITAMIN per tablet  Generic drug:  multivitamin     VITAMIN D3 5,000 unit Tab  Generic drug:  cholecalciferol (vitamin D3)         * This list has 2 medication(s) that are the same as other medications prescribed for you. Read the directions carefully, and ask your doctor or other care provider to review them with you.            STOP taking these  medications    busPIRone 30 MG Tab  Commonly known as:  BUSPAR  Stopped by:  Ignacio Terrell MD

## 2018-11-29 ENCOUNTER — OFFICE VISIT (OUTPATIENT)
Dept: FAMILY MEDICINE | Facility: CLINIC | Age: 38
End: 2018-11-29
Payer: COMMERCIAL

## 2018-11-29 VITALS
HEIGHT: 72 IN | BODY MASS INDEX: 42.66 KG/M2 | SYSTOLIC BLOOD PRESSURE: 138 MMHG | DIASTOLIC BLOOD PRESSURE: 88 MMHG | OXYGEN SATURATION: 97 % | WEIGHT: 315 LBS | HEART RATE: 69 BPM

## 2018-11-29 DIAGNOSIS — E66.01 MORBID OBESITY WITH BMI OF 50.0-59.9, ADULT: ICD-10-CM

## 2018-11-29 DIAGNOSIS — F41.9 ANXIETY: ICD-10-CM

## 2018-11-29 DIAGNOSIS — J45.20 MILD INTERMITTENT ASTHMA WITHOUT COMPLICATION: ICD-10-CM

## 2018-11-29 DIAGNOSIS — I10 ESSENTIAL HYPERTENSION: Primary | ICD-10-CM

## 2018-11-29 DIAGNOSIS — R05.9 COUGH: ICD-10-CM

## 2018-11-29 PROCEDURE — 99214 OFFICE O/P EST MOD 30 MIN: CPT | Mod: S$GLB,,, | Performed by: FAMILY MEDICINE

## 2018-11-29 PROCEDURE — 3079F DIAST BP 80-89 MM HG: CPT | Mod: CPTII,S$GLB,, | Performed by: FAMILY MEDICINE

## 2018-11-29 PROCEDURE — 99999 PR PBB SHADOW E&M-EST. PATIENT-LVL III: CPT | Mod: PBBFAC,,, | Performed by: FAMILY MEDICINE

## 2018-11-29 PROCEDURE — 3008F BODY MASS INDEX DOCD: CPT | Mod: CPTII,S$GLB,, | Performed by: FAMILY MEDICINE

## 2018-11-29 PROCEDURE — 3075F SYST BP GE 130 - 139MM HG: CPT | Mod: CPTII,S$GLB,, | Performed by: FAMILY MEDICINE

## 2018-11-29 RX ORDER — LOSARTAN POTASSIUM 100 MG/1
100 TABLET ORAL DAILY
Qty: 30 TABLET | Refills: 5 | Status: SHIPPED | OUTPATIENT
Start: 2018-11-29 | End: 2019-07-03 | Stop reason: SDUPTHER

## 2018-11-29 RX ORDER — FLUVOXAMINE MALEATE 50 MG/1
TABLET ORAL
COMMUNITY
Start: 2018-10-24 | End: 2018-11-29 | Stop reason: SDUPTHER

## 2018-11-29 RX ORDER — FLUVOXAMINE MALEATE 100 MG/1
100 TABLET, COATED ORAL NIGHTLY
COMMUNITY
Start: 2018-11-15 | End: 2019-12-05

## 2018-11-29 NOTE — PROGRESS NOTES
Subjective:       Patient ID: Greg Garcia is a 38 y.o. male.    Chief Complaint: Hypertension    HPI     Here for a f/u.     Reports that lisinopril is causing him to have a cough.     bp at home: 136-145/88-92 range    Seeing Dr. Olivas, medical psychologist, for anxiety management.       Asthma controlled.     Has gained 7 lbs over the past 7 weeks.        Review of Systems      Review of Systems   Constitutional: Negative for fever and chills.   HENT: Negative for hearing loss and neck stiffness.    Eyes: Negative for redness and itching.   Respiratory: Negative for cough and choking.    Cardiovascular: Negative for chest pain and leg swelling.  Abdomen: Negative for abdominal pain and blood in stool.   Genitourinary: Negative for dysuria and flank pain.   Musculoskeletal: Negative for back pain and gait problem.   Neurological: Negative for light-headedness and headaches.   Hematological: Negative for adenopathy.   Psychiatric/Behavioral: Negative for behavioral problems.       Objective:      Physical Exam   HENT:   Head: Atraumatic.   Eyes: Conjunctivae are normal. Pupils are equal, round, and reactive to light.   Neck: Normal range of motion.   Cardiovascular: Normal rate and regular rhythm.   No murmur heard.  Pulmonary/Chest: Effort normal and breath sounds normal. He has no wheezes.   Lymphadenopathy:     He has no cervical adenopathy.       Assessment:       1. Essential hypertension    2. Anxiety    3. Mild intermittent asthma without complication    4. Cough    5. Morbid obesity with BMI of 50.0-59.9, adult        Plan:       Essential hypertension    Anxiety    Mild intermittent asthma without complication    Cough    Morbid obesity with BMI of 50.0-59.9, adult    Other orders  -     losartan (COZAAR) 100 MG tablet; Take 1 tablet (100 mg total) by mouth once daily.  Dispense: 30 tablet; Refill: 5                Plan:  D/c lisinopril. Start losartan  Cont all other meds       Medication List            Accurate as of 11/29/18 10:01 AM. If you have any questions, ask your nurse or doctor.               START taking these medications    losartan 100 MG tablet  Commonly known as:  COZAAR  Take 1 tablet (100 mg total) by mouth once daily.  Started by:  Ignacio Terrell MD        CHANGE how you take these medications    cetirizine-pseudoephedrine 5-120 mg Tb12  Take 5 mg by mouth 2 (two) times daily.  What changed:  when to take this        CONTINUE taking these medications    albuterol 5 mg/mL nebulizer solution  Commonly known as:  PROVENTIL     artificial tears ointment Oint  Commonly known as:  REFRESH P.M.     azelastine 137 mcg (0.1 %) nasal spray  Commonly known as:  ASTELIN  USE ONE SPRAY IN EACH NOSTRIL TWICE DAILY     buPROPion 150 MG TB24 tablet  Commonly known as:  WELLBUTRIN XL     carvedilol 25 MG tablet  Commonly known as:  COREG  Take 1 tablet (25 mg total) by mouth 2 (two) times daily with meals.     esomeprazole 20 MG capsule  Commonly known as:  NEXIUM     * fluticasone-salmeterol 250-50 mcg/dose 250-50 mcg/dose diskus inhaler  Commonly known as:  ADVAIR DISKUS  INHALE ONE DOSE BY MOUTH TWICE DAILY     * ADVAIR DISKUS 250-50 mcg/dose diskus inhaler  Generic drug:  fluticasone-salmeterol 250-50 mcg/dose  INHALE ONE PUFF INTO LUNGS AS NEEDED     fluvoxaMINE 100 MG tablet  Commonly known as:  LUVOX     glucosamine-chondroitin 500-400 mg tablet     ibuprofen 200 MG tablet  Commonly known as:  ADVIL,MOTRIN     ketoconazole 2 % cream  Commonly known as:  NIZORAL  Thin film to AA groin daily PRN flare     montelukast 10 mg tablet  Commonly known as:  SINGULAIR  Take 1 tablet (10 mg total) by mouth once daily.     ONE DAILY MULTIVITAMIN per tablet  Generic drug:  multivitamin     VITAMIN D3 5,000 unit Tab  Generic drug:  cholecalciferol (vitamin D3)         * This list has 2 medication(s) that are the same as other medications prescribed for you. Read the directions carefully, and ask your doctor  or other care provider to review them with you.            STOP taking these medications    FLUoxetine 40 MG capsule  Commonly known as:  PROZAC  Stopped by:  Ignacio Terrell MD     lisinopril 40 MG tablet  Commonly known as:  PRINIVIL,ZESTRIL  Stopped by:  Ignacio Terrell MD           Where to Get Your Medications      These medications were sent to [facility] [address] [place] [address] [address] [facility] [address]    Phone:  [phone]   · losartan 100 MG tablet                  Medication List           Accurate as of 11/29/18 10:01 AM. If you have any questions, ask your nurse or doctor.               START taking these medications    losartan 100 MG tablet  Commonly known as:  COZAAR  Take 1 tablet (100 mg total) by mouth once daily.  Started by:  Ignacio Terrell MD        CHANGE how you take these medications    cetirizine-pseudoephedrine 5-120 mg Tb12  Take 5 mg by mouth 2 (two) times daily.  What changed:  when to take this        CONTINUE taking these medications    albuterol 5 mg/mL nebulizer solution  Commonly known as:  PROVENTIL     artificial tears ointment Oint  Commonly known as:  REFRESH P.M.     azelastine 137 mcg (0.1 %) nasal spray  Commonly known as:  ASTELIN  USE ONE SPRAY IN EACH NOSTRIL TWICE DAILY     buPROPion 150 MG TB24 tablet  Commonly known as:  WELLBUTRIN XL     carvedilol 25 MG tablet  Commonly known as:  COREG  Take 1 tablet (25 mg total) by mouth 2 (two) times daily with meals.     esomeprazole 20 MG capsule  Commonly known as:  NEXIUM     * fluticasone-salmeterol 250-50 mcg/dose 250-50 mcg/dose diskus inhaler  Commonly known as:  ADVAIR DISKUS  INHALE ONE DOSE BY MOUTH TWICE DAILY     * ADVAIR DISKUS 250-50 mcg/dose diskus inhaler  Generic drug:  fluticasone-salmeterol 250-50 mcg/dose  INHALE ONE PUFF INTO LUNGS AS NEEDED     fluvoxaMINE 100 MG tablet  Commonly known as:  LUVOX     glucosamine-chondroitin 500-400 mg tablet     ibuprofen 200 MG  tablet  Commonly known as:  ADVIL,MOTRIN     ketoconazole 2 % cream  Commonly known as:  NIZORAL  Thin film to AA groin daily PRN flare     montelukast 10 mg tablet  Commonly known as:  SINGULAIR  Take 1 tablet (10 mg total) by mouth once daily.     ONE DAILY MULTIVITAMIN per tablet  Generic drug:  multivitamin     VITAMIN D3 5,000 unit Tab  Generic drug:  cholecalciferol (vitamin D3)         * This list has 2 medication(s) that are the same as other medications prescribed for you. Read the directions carefully, and ask your doctor or other care provider to review them with you.            STOP taking these medications    FLUoxetine 40 MG capsule  Commonly known as:  PROZAC  Stopped by:  Ignacio Terrell MD     lisinopril 40 MG tablet  Commonly known as:  PRINIVIL,ZESTRIL  Stopped by:  Ignacio Terrell MD           Where to Get Your Medications      These medications were sent to [facility] [address] [place] [address] [address]  [address] [facility] [address] [address]    Phone:  [phone]   · losartan 100 MG tablet

## 2018-12-13 ENCOUNTER — PATIENT MESSAGE (OUTPATIENT)
Dept: FAMILY MEDICINE | Facility: CLINIC | Age: 38
End: 2018-12-13

## 2018-12-18 ENCOUNTER — OFFICE VISIT (OUTPATIENT)
Dept: FAMILY MEDICINE | Facility: CLINIC | Age: 38
End: 2018-12-18
Payer: COMMERCIAL

## 2018-12-18 ENCOUNTER — LAB VISIT (OUTPATIENT)
Dept: LAB | Facility: HOSPITAL | Age: 38
End: 2018-12-18
Attending: NURSE PRACTITIONER
Payer: COMMERCIAL

## 2018-12-18 VITALS
OXYGEN SATURATION: 99 % | DIASTOLIC BLOOD PRESSURE: 88 MMHG | TEMPERATURE: 98 F | RESPIRATION RATE: 20 BRPM | HEIGHT: 72 IN | BODY MASS INDEX: 42.66 KG/M2 | HEART RATE: 66 BPM | WEIGHT: 315 LBS | SYSTOLIC BLOOD PRESSURE: 136 MMHG

## 2018-12-18 DIAGNOSIS — R35.89 POLYURIA: ICD-10-CM

## 2018-12-18 DIAGNOSIS — I10 ESSENTIAL HYPERTENSION: ICD-10-CM

## 2018-12-18 DIAGNOSIS — E66.01 MORBID OBESITY: ICD-10-CM

## 2018-12-18 DIAGNOSIS — R32 URINARY INCONTINENCE, UNSPECIFIED TYPE: ICD-10-CM

## 2018-12-18 DIAGNOSIS — R31.9 URINARY TRACT INFECTION WITH HEMATURIA, SITE UNSPECIFIED: Primary | ICD-10-CM

## 2018-12-18 DIAGNOSIS — N39.0 URINARY TRACT INFECTION WITH HEMATURIA, SITE UNSPECIFIED: Primary | ICD-10-CM

## 2018-12-18 DIAGNOSIS — R81 GLYCOSURIA: ICD-10-CM

## 2018-12-18 LAB
ALBUMIN SERPL BCP-MCNC: 3.7 G/DL
ALP SERPL-CCNC: 118 U/L
ALT SERPL W/O P-5'-P-CCNC: 23 U/L
ANION GAP SERPL CALC-SCNC: 7 MMOL/L
AST SERPL-CCNC: 19 U/L
BACTERIA #/AREA URNS HPF: NORMAL /HPF
BASOPHILS # BLD AUTO: 0.09 K/UL
BASOPHILS NFR BLD: 1 %
BILIRUB SERPL-MCNC: 0.4 MG/DL
BILIRUB SERPL-MCNC: ABNORMAL MG/DL
BILIRUB UR QL STRIP: NEGATIVE
BLOOD URINE, POC: ABNORMAL
BUN SERPL-MCNC: 8 MG/DL
CALCIUM SERPL-MCNC: 9.6 MG/DL
CHLORIDE SERPL-SCNC: 104 MMOL/L
CLARITY UR: CLEAR
CO2 SERPL-SCNC: 29 MMOL/L
COLOR UR: YELLOW
COLOR, POC UA: ABNORMAL
CREAT SERPL-MCNC: 1 MG/DL
DIFFERENTIAL METHOD: ABNORMAL
EOSINOPHIL # BLD AUTO: 0.3 K/UL
EOSINOPHIL NFR BLD: 3.7 %
ERYTHROCYTE [DISTWIDTH] IN BLOOD BY AUTOMATED COUNT: 12.8 %
EST. GFR  (AFRICAN AMERICAN): >60 ML/MIN/1.73 M^2
EST. GFR  (NON AFRICAN AMERICAN): >60 ML/MIN/1.73 M^2
ESTIMATED AVG GLUCOSE: 103 MG/DL
GLUCOSE SERPL-MCNC: 77 MG/DL
GLUCOSE UR QL STRIP: 50
GLUCOSE UR QL STRIP: NEGATIVE
HBA1C MFR BLD HPLC: 5.2 %
HCT VFR BLD AUTO: 42.7 %
HGB BLD-MCNC: 13.7 G/DL
HGB UR QL STRIP: NEGATIVE
IMM GRANULOCYTES # BLD AUTO: 0.03 K/UL
IMM GRANULOCYTES NFR BLD AUTO: 0.3 %
KETONES UR QL STRIP: ABNORMAL
KETONES UR QL STRIP: NEGATIVE
LEUKOCYTE ESTERASE UR QL STRIP: ABNORMAL
LEUKOCYTE ESTERASE URINE, POC: ABNORMAL
LYMPHOCYTES # BLD AUTO: 2.4 K/UL
LYMPHOCYTES NFR BLD: 27.3 %
MCH RBC QN AUTO: 29.5 PG
MCHC RBC AUTO-ENTMCNC: 32.1 G/DL
MCV RBC AUTO: 92 FL
MICROSCOPIC COMMENT: NORMAL
MONOCYTES # BLD AUTO: 1 K/UL
MONOCYTES NFR BLD: 11.7 %
NEUTROPHILS # BLD AUTO: 4.9 K/UL
NEUTROPHILS NFR BLD: 56 %
NITRITE UR QL STRIP: NEGATIVE
NITRITE, POC UA: ABNORMAL
NRBC BLD-RTO: 0 /100 WBC
PH UR STRIP: 6 [PH] (ref 5–8)
PH, POC UA: 5
PLATELET # BLD AUTO: 249 K/UL
PMV BLD AUTO: 12.7 FL
POTASSIUM SERPL-SCNC: 4.2 MMOL/L
PROT SERPL-MCNC: 7 G/DL
PROT UR QL STRIP: NEGATIVE
PROTEIN, POC: ABNORMAL
RBC # BLD AUTO: 4.65 M/UL
RBC #/AREA URNS HPF: 2 /HPF (ref 0–4)
SODIUM SERPL-SCNC: 140 MMOL/L
SP GR UR STRIP: <=1.005 (ref 1–1.03)
SPECIFIC GRAVITY, POC UA: 1.01
URN SPEC COLLECT METH UR: ABNORMAL
UROBILINOGEN, POC UA: ABNORMAL
WBC # BLD AUTO: 8.72 K/UL
WBC #/AREA URNS HPF: 5 /HPF (ref 0–5)

## 2018-12-18 PROCEDURE — 80053 COMPREHEN METABOLIC PANEL: CPT

## 2018-12-18 PROCEDURE — 87086 URINE CULTURE/COLONY COUNT: CPT

## 2018-12-18 PROCEDURE — 85025 COMPLETE CBC W/AUTO DIFF WBC: CPT

## 2018-12-18 PROCEDURE — 99214 OFFICE O/P EST MOD 30 MIN: CPT | Mod: 25,S$GLB,, | Performed by: NURSE PRACTITIONER

## 2018-12-18 PROCEDURE — 3008F BODY MASS INDEX DOCD: CPT | Mod: CPTII,S$GLB,, | Performed by: NURSE PRACTITIONER

## 2018-12-18 PROCEDURE — 83036 HEMOGLOBIN GLYCOSYLATED A1C: CPT

## 2018-12-18 PROCEDURE — 3075F SYST BP GE 130 - 139MM HG: CPT | Mod: CPTII,S$GLB,, | Performed by: NURSE PRACTITIONER

## 2018-12-18 PROCEDURE — 36415 COLL VENOUS BLD VENIPUNCTURE: CPT | Mod: PO

## 2018-12-18 PROCEDURE — 81000 URINALYSIS NONAUTO W/SCOPE: CPT | Mod: PO

## 2018-12-18 PROCEDURE — 81002 URINALYSIS NONAUTO W/O SCOPE: CPT | Mod: S$GLB,,, | Performed by: NURSE PRACTITIONER

## 2018-12-18 PROCEDURE — 87147 CULTURE TYPE IMMUNOLOGIC: CPT

## 2018-12-18 PROCEDURE — 99999 PR PBB SHADOW E&M-EST. PATIENT-LVL IV: CPT | Mod: PBBFAC,,, | Performed by: NURSE PRACTITIONER

## 2018-12-18 PROCEDURE — 3079F DIAST BP 80-89 MM HG: CPT | Mod: CPTII,S$GLB,, | Performed by: NURSE PRACTITIONER

## 2018-12-18 PROCEDURE — 87088 URINE BACTERIA CULTURE: CPT

## 2018-12-18 RX ORDER — DOXYCYCLINE HYCLATE 100 MG
100 TABLET ORAL EVERY 12 HOURS
Qty: 14 TABLET | Refills: 0 | Status: CANCELLED | OUTPATIENT
Start: 2018-12-18 | End: 2018-12-25

## 2018-12-18 RX ORDER — DOXYCYCLINE HYCLATE 100 MG
100 TABLET ORAL EVERY 12 HOURS
Qty: 14 TABLET | Refills: 0 | Status: SHIPPED | OUTPATIENT
Start: 2018-12-18 | End: 2018-12-20 | Stop reason: ALTCHOICE

## 2018-12-18 NOTE — PROGRESS NOTES
Subjective:       Patient ID: Greg Garcia is a 38 y.o. male.    Chief Complaint: Urinary Incontinence    Mr. Garcia is a new patient to me. He presents today for urinary incontinence    HPI   This is a new problem. Occurs in the middle of the night (happened a few times). History of bedwetting but hasn't happened since adolescence. Very concerned and anxious---worried it is a side effect of his new medication (fluvoxamine)  Vitals:    12/18/18 1312   BP: 136/88   Pulse: 66   Resp: 20   Temp: 98.1 °F (36.7 °C)     Review of Systems   Constitutional: Negative for activity change, diaphoresis and fever.   HENT: Negative for facial swelling and trouble swallowing.    Eyes: Negative for discharge and redness.   Respiratory: Negative for cough, shortness of breath and wheezing.    Cardiovascular: Negative for chest pain and palpitations.   Gastrointestinal: Negative for abdominal pain, constipation, diarrhea and vomiting.   Endocrine: Positive for polyuria.   Genitourinary: Positive for urgency. Negative for difficulty urinating and hematuria.   Musculoskeletal: Negative for gait problem.   Skin: Negative for pallor and rash.   Neurological: Negative for facial asymmetry, speech difficulty and headaches.   Psychiatric/Behavioral: Positive for dysphoric mood. Negative for confusion. The patient is not nervous/anxious.        Past Medical History:   Diagnosis Date    AR (allergic rhinitis)     Asthma     Cervicalgia     Depression     sees Amy Olivas, psychologist    JOHN (obstructive sleep apnea)     cpap     Objective:      Physical Exam   Constitutional: He is oriented to person, place, and time. He does not have a sickly appearance. No distress.   HENT:   Head: Normocephalic.   Right Ear: Hearing normal.   Left Ear: Hearing normal.   Nose: Nose normal.   Eyes: Conjunctivae and lids are normal.   Neck: No JVD present. No tracheal deviation present.   Cardiovascular: Normal rate.   Pulmonary/Chest:  Effort normal.   Abdominal: He exhibits no distension.   Musculoskeletal: Normal range of motion. He exhibits no deformity.   Neurological: He is alert and oriented to person, place, and time.   Skin: He is not diaphoretic. No pallor.   Psychiatric: His speech is normal and behavior is normal. Judgment and thought content normal. His mood appears anxious. Cognition and memory are normal. He exhibits normal remote memory.   Nursing note and vitals reviewed.      Assessment:       1. Urinary tract infection with hematuria, site unspecified    2. Urinary incontinence, unspecified type    3. Polyuria    4. Glycosuria    5. Morbid obesity    6. Essential hypertension        Plan:       Urinary tract infection with hematuria, site unspecified  -     Urine culture; Future; Expected date: 12/18/2018  -     doxycycline (VIBRA-TABS) 100 MG tablet; Take 1 tablet (100 mg total) by mouth every 12 (twelve) hours. for 7 days  Dispense: 14 tablet; Refill: 0    Urinary incontinence, unspecified type  -     POCT URINE DIPSTICK WITHOUT MICROSCOPE  -     CBC auto differential; Future; Expected date: 12/18/2018  -     C. trachomatis/N. gonorrhoeae by AMP DNA; Future  -     Urinalysis; Future; Expected date: 12/18/2018  -     Urinalysis Microscopic; Future; Expected date: 12/18/2018  -     Urine culture; Future; Expected date: 12/18/2018    Polyuria  -     Hemoglobin A1c; Future; Expected date: 12/18/2018    Glycosuria  -     Hemoglobin A1c; Future; Expected date: 12/18/2018    Morbid obesity  -     Hemoglobin A1c; Future; Expected date: 12/18/2018    Essential hypertension  -     CBC auto differential; Future; Expected date: 12/18/2018  -     Comprehensive metabolic panel; Future; Expected date: 12/18/2018    Educated on safety, return precautions        Follow-up for workup today & further evaluation if s/s worsen, fail to improve, or new symptoms arise.       Medication List           Accurate as of 12/18/18  1:53 PM. If you have any  questions, ask your nurse or doctor.               START taking these medications    doxycycline 100 MG tablet  Commonly known as:  VIBRA-TABS  Take 1 tablet (100 mg total) by mouth every 12 (twelve) hours. for 7 days  Started by:  Marlee Crandall NP        CHANGE how you take these medications    cetirizine-pseudoephedrine 5-120 mg Tb12  Take 5 mg by mouth 2 (two) times daily.  What changed:  when to take this        CONTINUE taking these medications    albuterol 5 mg/mL nebulizer solution  Commonly known as:  PROVENTIL     artificial tears ointment Oint  Commonly known as:  REFRESH P.M.     azelastine 137 mcg (0.1 %) nasal spray  Commonly known as:  ASTELIN  USE ONE SPRAY IN EACH NOSTRIL TWICE DAILY     buPROPion 150 MG TB24 tablet  Commonly known as:  WELLBUTRIN XL     carvedilol 25 MG tablet  Commonly known as:  COREG  Take 1 tablet (25 mg total) by mouth 2 (two) times daily with meals.     esomeprazole 20 MG capsule  Commonly known as:  NEXIUM     * fluticasone-salmeterol 250-50 mcg/dose 250-50 mcg/dose diskus inhaler  Commonly known as:  ADVAIR DISKUS  INHALE ONE DOSE BY MOUTH TWICE DAILY     * ADVAIR DISKUS 250-50 mcg/dose diskus inhaler  Generic drug:  fluticasone-salmeterol 250-50 mcg/dose  INHALE ONE PUFF INTO LUNGS AS NEEDED     fluvoxaMINE 100 MG tablet  Commonly known as:  LUVOX     glucosamine-chondroitin 500-400 mg tablet     ibuprofen 200 MG tablet  Commonly known as:  ADVIL,MOTRIN     ketoconazole 2 % cream  Commonly known as:  NIZORAL  Thin film to AA groin daily PRN flare     losartan 100 MG tablet  Commonly known as:  COZAAR  Take 1 tablet (100 mg total) by mouth once daily.     montelukast 10 mg tablet  Commonly known as:  SINGULAIR  Take 1 tablet (10 mg total) by mouth once daily.     ONE DAILY MULTIVITAMIN per tablet  Generic drug:  multivitamin     VITAMIN D3 5,000 unit Tab  Generic drug:  cholecalciferol (vitamin D3)         * This list has 2 medication(s) that are the same as other  medications prescribed for you. Read the directions carefully, and ask your doctor or other care provider to review them with you.               Where to Get Your Medications      These medications were sent to [facility] - [place], [place] - [address]  [address], [place] [address]    Phone:  [phone]   · doxycycline 100 MG tablet

## 2018-12-19 ENCOUNTER — PATIENT MESSAGE (OUTPATIENT)
Dept: FAMILY MEDICINE | Facility: CLINIC | Age: 38
End: 2018-12-19

## 2018-12-20 ENCOUNTER — PATIENT MESSAGE (OUTPATIENT)
Dept: FAMILY MEDICINE | Facility: CLINIC | Age: 38
End: 2018-12-20

## 2018-12-20 ENCOUNTER — TELEPHONE (OUTPATIENT)
Dept: FAMILY MEDICINE | Facility: CLINIC | Age: 38
End: 2018-12-20

## 2018-12-20 LAB — BACTERIA UR CULT: NORMAL

## 2018-12-20 RX ORDER — CEPHALEXIN 500 MG/1
500 CAPSULE ORAL EVERY 12 HOURS
Qty: 14 CAPSULE | Refills: 0 | Status: SHIPPED | OUTPATIENT
Start: 2018-12-20 | End: 2018-12-27

## 2018-12-20 NOTE — TELEPHONE ENCOUNTER
----- Message from Marlee Crandall NP sent at 12/20/2018  2:41 PM CST -----  Please call lab--the chalmydia/gonorrhea that I ordered is not in process

## 2018-12-20 NOTE — TELEPHONE ENCOUNTER
Spoke with Tiffany at lab.  She is going to check if still able to collect that test for processing and will call pod back.

## 2018-12-27 DIAGNOSIS — N39.0 URINARY TRACT INFECTION WITHOUT HEMATURIA, SITE UNSPECIFIED: Primary | ICD-10-CM

## 2018-12-31 RX ORDER — MONTELUKAST SODIUM 10 MG/1
TABLET ORAL
Qty: 90 TABLET | Refills: 0 | Status: SHIPPED | OUTPATIENT
Start: 2018-12-31 | End: 2019-04-22 | Stop reason: SDUPTHER

## 2018-12-31 NOTE — PROGRESS NOTES
Refill Authorization Note     is requesting a refill authorization.    Brief assessment and rationale for refill: APPROVE: prr  Amount/Quantity of medication ordered: 90d        Refills Authorized: Yes  If authorized number of refills: 0           Medication Therapy Plan: Last commnted on in Jan; hawa 3 more   Name and strength of medication: MONTELUKAST 10MG TAB  How patient will take medication: t1t po daily   Medication reconciliation completed: No        Comments:

## 2019-01-31 NOTE — TELEPHONE ENCOUNTER
Received fax requesting refill of Zestril 40mg, stating pt is requesting.    This medication was d/c 11/29/18 in office visit.    Placed call to pt, no answer. Left detailed message to call clinic for clarification. Call back number provided.    [email or web address] message also sent to pt.

## 2019-02-01 ENCOUNTER — PATIENT MESSAGE (OUTPATIENT)
Dept: FAMILY MEDICINE | Facility: CLINIC | Age: 39
End: 2019-02-01

## 2019-02-28 ENCOUNTER — OFFICE VISIT (OUTPATIENT)
Dept: FAMILY MEDICINE | Facility: CLINIC | Age: 39
End: 2019-02-28
Payer: COMMERCIAL

## 2019-02-28 ENCOUNTER — LAB VISIT (OUTPATIENT)
Dept: LAB | Facility: HOSPITAL | Age: 39
End: 2019-02-28
Attending: FAMILY MEDICINE
Payer: COMMERCIAL

## 2019-02-28 VITALS
DIASTOLIC BLOOD PRESSURE: 84 MMHG | HEART RATE: 67 BPM | HEIGHT: 72 IN | BODY MASS INDEX: 42.66 KG/M2 | OXYGEN SATURATION: 98 % | WEIGHT: 315 LBS | RESPIRATION RATE: 18 BRPM | SYSTOLIC BLOOD PRESSURE: 132 MMHG

## 2019-02-28 DIAGNOSIS — F33.42 RECURRENT MAJOR DEPRESSIVE DISORDER, IN FULL REMISSION: ICD-10-CM

## 2019-02-28 DIAGNOSIS — R61 DIAPHORESIS: ICD-10-CM

## 2019-02-28 DIAGNOSIS — J45.20 MILD INTERMITTENT ASTHMA WITHOUT COMPLICATION: ICD-10-CM

## 2019-02-28 DIAGNOSIS — R61 DIAPHORESIS: Primary | ICD-10-CM

## 2019-02-28 DIAGNOSIS — I10 ESSENTIAL HYPERTENSION: ICD-10-CM

## 2019-02-28 LAB — TSH SERPL DL<=0.005 MIU/L-ACNC: 1.52 UIU/ML

## 2019-02-28 PROCEDURE — 36415 COLL VENOUS BLD VENIPUNCTURE: CPT | Mod: PO

## 2019-02-28 PROCEDURE — 3079F DIAST BP 80-89 MM HG: CPT | Mod: CPTII,S$GLB,, | Performed by: FAMILY MEDICINE

## 2019-02-28 PROCEDURE — 99214 OFFICE O/P EST MOD 30 MIN: CPT | Mod: S$GLB,,, | Performed by: FAMILY MEDICINE

## 2019-02-28 PROCEDURE — 99999 PR PBB SHADOW E&M-EST. PATIENT-LVL III: CPT | Mod: PBBFAC,,, | Performed by: FAMILY MEDICINE

## 2019-02-28 PROCEDURE — 99214 PR OFFICE/OUTPT VISIT, EST, LEVL IV, 30-39 MIN: ICD-10-PCS | Mod: S$GLB,,, | Performed by: FAMILY MEDICINE

## 2019-02-28 PROCEDURE — 99999 PR PBB SHADOW E&M-EST. PATIENT-LVL III: ICD-10-PCS | Mod: PBBFAC,,, | Performed by: FAMILY MEDICINE

## 2019-02-28 PROCEDURE — 3075F SYST BP GE 130 - 139MM HG: CPT | Mod: CPTII,S$GLB,, | Performed by: FAMILY MEDICINE

## 2019-02-28 PROCEDURE — 84443 ASSAY THYROID STIM HORMONE: CPT

## 2019-02-28 PROCEDURE — 3079F PR MOST RECENT DIASTOLIC BLOOD PRESSURE 80-89 MM HG: ICD-10-PCS | Mod: CPTII,S$GLB,, | Performed by: FAMILY MEDICINE

## 2019-02-28 PROCEDURE — 3008F BODY MASS INDEX DOCD: CPT | Mod: CPTII,S$GLB,, | Performed by: FAMILY MEDICINE

## 2019-02-28 PROCEDURE — 3075F PR MOST RECENT SYSTOLIC BLOOD PRESS GE 130-139MM HG: ICD-10-PCS | Mod: CPTII,S$GLB,, | Performed by: FAMILY MEDICINE

## 2019-02-28 PROCEDURE — 3008F PR BODY MASS INDEX (BMI) DOCUMENTED: ICD-10-PCS | Mod: CPTII,S$GLB,, | Performed by: FAMILY MEDICINE

## 2019-02-28 NOTE — PROGRESS NOTES
Subjective:       Patient ID: Greg Garcia is a 38 y.o. male.    Chief Complaint: Hypertension and Excessive Sweating    HPI       Here for a f/u.      htn stable.      Seeing Dr. Olivas, medical psychologist, for anxiety management.       Asthma controlled.      Reports excessive sweating over the past 3 months since he has had his luvox increased from 100 mg to 150 mg daily by Dr. Olivas.       Review of Systems      Review of Systems   Constitutional: Negative for fever and chills.   HENT: Negative for hearing loss and neck stiffness.    Eyes: Negative for redness and itching.   Respiratory: Negative for cough and choking.    Cardiovascular: Negative for chest pain and leg swelling.  Abdomen: Negative for abdominal pain and blood in stool.   Genitourinary: Negative for dysuria and flank pain.   Musculoskeletal: Negative for back pain and gait problem.   Neurological: Negative for light-headedness and headaches.   Hematological: Negative for adenopathy.         Objective:      Physical Exam   HENT:   Head: Atraumatic.   Eyes: Conjunctivae are normal. Pupils are equal, round, and reactive to light.   Neck: Normal range of motion.   Cardiovascular: Normal rate and regular rhythm.   No murmur heard.  Pulmonary/Chest: Effort normal and breath sounds normal. He has no wheezes.   Lymphadenopathy:     He has no cervical adenopathy.       Assessment:       1. Diaphoresis    2. Essential hypertension    3. Recurrent major depressive disorder, in full remission    4. Mild intermittent asthma without complication        Plan:       Diaphoresis  -     TSH; Future; Expected date: 02/28/2019    Essential hypertension    Recurrent major depressive disorder, in full remission    Mild intermittent asthma without complication                Plan:  Patient will discuss with his psychologist about changing his luvox or decreasing the dosage of luvox due to his diaphoresis  Cont current meds in the mean time.            Medication List with Changes/Refills   Current Medications    ADVAIR DISKUS 250-50 MCG/DOSE DISKUS INHALER    INHALE ONE PUFF INTO LUNGS AS NEEDED    ALBUTEROL SULFATE (VENTOLIN HFA INHL)    Inhale 2 puffs into the lungs every 6 (six) hours as needed.    AZELASTINE (ASTELIN) 137 MCG (0.1 %) NASAL SPRAY    USE ONE SPRAY IN EACH NOSTRIL TWICE DAILY    BUPROPION (WELLBUTRIN XL) 150 MG TB24 TABLET    Take 150 mg by mouth once daily.     CARVEDILOL (COREG) 25 MG TABLET    Take 1 tablet (25 mg total) by mouth 2 (two) times daily with meals.    CETIRIZINE-PSEUDOEPHEDRINE 5-120 MG TB12    Take 5 mg by mouth 2 (two) times daily.    CHOLECALCIFEROL, VITAMIN D3, (VITAMIN D3) 5,000 UNIT TAB    Take 5,000 Units by mouth once daily.    ESOMEPRAZOLE (NEXIUM) 20 MG CAPSULE    Take 20 mg by mouth once daily.    FLUVOXAMINE (LUVOX) 100 MG TABLET    Take 150 mg by mouth nightly.     GLUCOSAMINE-CHONDROITIN 500-400 MG TABLET    Take 1 tablet by mouth 2 (two) times daily.    IBUPROFEN (ADVIL,MOTRIN) 200 MG TABLET    Take 200 mg by mouth 2 (two) times daily. 4 tab twice daily    KETOCONAZOLE (NIZORAL) 2 % CREAM    Thin film to AA groin daily PRN flare    LOSARTAN (COZAAR) 100 MG TABLET    Take 1 tablet (100 mg total) by mouth once daily.    MONTELUKAST (SINGULAIR) 10 MG TABLET    TAKE ONE TABLET BY MOUTH ONCE DAILY    MULTIVITAMIN (ONE DAILY MULTIVITAMIN) PER TABLET    Take 1 tablet by mouth once daily.   Discontinued Medications    ALBUTEROL (PROVENTIL) 5 MG/ML NEBULIZER SOLUTION    Take 2.5 mg by nebulization every 6 (six) hours as needed for Wheezing.    ARTIFICIAL TEARS OINTMENT (REFRESH P.M.) OINT    every evening.    FLUTICASONE-SALMETEROL 250-50 MCG/DOSE (ADVAIR DISKUS) 250-50 MCG/DOSE DISKUS INHALER    INHALE ONE DOSE BY MOUTH TWICE DAILY

## 2019-04-03 RX ORDER — CARVEDILOL 25 MG/1
TABLET ORAL
Qty: 180 TABLET | Refills: 3 | Status: SHIPPED | OUTPATIENT
Start: 2019-04-03 | End: 2020-06-09 | Stop reason: SDUPTHER

## 2019-04-03 NOTE — PROGRESS NOTES
Refill Authorization Note     is requesting a refill authorization.    Brief assessment and rationale for refill: APPROVE: prr  Name and strength of medication: CARVEDILOL 25MG     TAB       Medication Therapy Plan: BP controlled; hawa 12 more                   How patient will take medication: t1t po daily           Comments:   BP Readings from Last 3 Encounters:   02/28/19 132/84   12/18/18 136/88   11/29/18 138/88     Pulse Readings from Last 3 Encounters:   02/28/19 67   12/18/18 66   11/29/18 69

## 2019-04-24 RX ORDER — MONTELUKAST SODIUM 10 MG/1
TABLET ORAL
Qty: 90 TABLET | Refills: 0 | Status: SHIPPED | OUTPATIENT
Start: 2019-04-24 | End: 2019-08-03 | Stop reason: SDUPTHER

## 2019-05-06 ENCOUNTER — TELEPHONE (OUTPATIENT)
Dept: FAMILY MEDICINE | Facility: CLINIC | Age: 39
End: 2019-05-06

## 2019-05-06 NOTE — TELEPHONE ENCOUNTER
----- Message from Deniz Blackburn sent at 5/6/2019  2:59 PM CDT -----  Contact: self   Placed call to pod, patient miss call from your office please call back at [phone] (home)

## 2019-05-21 ENCOUNTER — PATIENT OUTREACH (OUTPATIENT)
Dept: ADMINISTRATIVE | Facility: HOSPITAL | Age: 39
End: 2019-05-21

## 2019-05-21 NOTE — PROGRESS NOTES
Chart review complete/scrubbed 05/21/2019  Future Appointments   Date Time Provider Department Center   6/4/2019  1:00 PM Ignacio Terrell MD [facility]

## 2019-05-30 DIAGNOSIS — L30.4 INTERTRIGO: ICD-10-CM

## 2019-05-30 RX ORDER — KETOCONAZOLE 20 MG/G
CREAM TOPICAL
Qty: 60 G | Refills: 5 | Status: SHIPPED | OUTPATIENT
Start: 2019-05-30 | End: 2024-03-27

## 2019-06-04 ENCOUNTER — OFFICE VISIT (OUTPATIENT)
Dept: FAMILY MEDICINE | Facility: CLINIC | Age: 39
End: 2019-06-04
Payer: COMMERCIAL

## 2019-06-04 VITALS
SYSTOLIC BLOOD PRESSURE: 136 MMHG | DIASTOLIC BLOOD PRESSURE: 86 MMHG | WEIGHT: 315 LBS | HEART RATE: 96 BPM | BODY MASS INDEX: 42.66 KG/M2 | OXYGEN SATURATION: 97 % | HEIGHT: 72 IN

## 2019-06-04 DIAGNOSIS — J45.20 MILD INTERMITTENT ASTHMA WITHOUT COMPLICATION: ICD-10-CM

## 2019-06-04 DIAGNOSIS — F32.5 MAJOR DEPRESSIVE DISORDER IN FULL REMISSION, UNSPECIFIED WHETHER RECURRENT: ICD-10-CM

## 2019-06-04 DIAGNOSIS — I10 ESSENTIAL HYPERTENSION: Primary | ICD-10-CM

## 2019-06-04 DIAGNOSIS — F41.9 ANXIETY: ICD-10-CM

## 2019-06-04 PROCEDURE — 3008F PR BODY MASS INDEX (BMI) DOCUMENTED: ICD-10-PCS | Mod: CPTII,S$GLB,, | Performed by: FAMILY MEDICINE

## 2019-06-04 PROCEDURE — 3008F BODY MASS INDEX DOCD: CPT | Mod: CPTII,S$GLB,, | Performed by: FAMILY MEDICINE

## 2019-06-04 PROCEDURE — 3079F DIAST BP 80-89 MM HG: CPT | Mod: CPTII,S$GLB,, | Performed by: FAMILY MEDICINE

## 2019-06-04 PROCEDURE — 99214 PR OFFICE/OUTPT VISIT, EST, LEVL IV, 30-39 MIN: ICD-10-PCS | Mod: S$GLB,,, | Performed by: FAMILY MEDICINE

## 2019-06-04 PROCEDURE — 3075F PR MOST RECENT SYSTOLIC BLOOD PRESS GE 130-139MM HG: ICD-10-PCS | Mod: CPTII,S$GLB,, | Performed by: FAMILY MEDICINE

## 2019-06-04 PROCEDURE — 3079F PR MOST RECENT DIASTOLIC BLOOD PRESSURE 80-89 MM HG: ICD-10-PCS | Mod: CPTII,S$GLB,, | Performed by: FAMILY MEDICINE

## 2019-06-04 PROCEDURE — 99214 OFFICE O/P EST MOD 30 MIN: CPT | Mod: S$GLB,,, | Performed by: FAMILY MEDICINE

## 2019-06-04 PROCEDURE — 99999 PR PBB SHADOW E&M-EST. PATIENT-LVL III: ICD-10-PCS | Mod: PBBFAC,,, | Performed by: FAMILY MEDICINE

## 2019-06-04 PROCEDURE — 99999 PR PBB SHADOW E&M-EST. PATIENT-LVL III: CPT | Mod: PBBFAC,,, | Performed by: FAMILY MEDICINE

## 2019-06-04 PROCEDURE — 3075F SYST BP GE 130 - 139MM HG: CPT | Mod: CPTII,S$GLB,, | Performed by: FAMILY MEDICINE

## 2019-06-04 RX ORDER — BUPROPION HYDROCHLORIDE 300 MG/1
TABLET ORAL
COMMUNITY
Start: 2019-06-03 | End: 2021-02-18 | Stop reason: ALTCHOICE

## 2019-06-04 NOTE — PROGRESS NOTES
Subjective:       Patient ID: Greg Garcia is a 39 y.o. male.    Chief Complaint: Hypertension    HPI     Here for a f/u.      htn stable.      Seeing Dr. Olivas, medical psychologist, for anxiety management.       Asthma controlled.      On luvox and wellbutrin for depression management.       Review of Systems      Review of Systems   Constitutional: Negative for fever and chills.   HENT: Negative for hearing loss and neck stiffness.    Eyes: Negative for redness and itching.   Respiratory: Negative for cough and choking.    Cardiovascular: Negative for chest pain and leg swelling.  Abdomen: Negative for abdominal pain and blood in stool.   Genitourinary: Negative for dysuria and flank pain.   Musculoskeletal: Negative for back pain and gait problem.   Neurological: Negative for light-headedness and headaches.   Hematological: Negative for adenopathy.   Psychiatric/Behavioral: Negative for behavioral problems.     Objective:      Physical Exam   HENT:   Head: Atraumatic.   Eyes: Pupils are equal, round, and reactive to light. Conjunctivae are normal.   Neck: Normal range of motion.   Cardiovascular: Normal rate and regular rhythm.   No murmur heard.  Pulmonary/Chest: Effort normal and breath sounds normal. He has no wheezes.   Lymphadenopathy:     He has no cervical adenopathy.       Assessment:       1. Essential hypertension    2. Mild intermittent asthma without complication    3. Major depressive disorder in full remission, unspecified whether recurrent    4. Anxiety        Plan:       Essential hypertension    Mild intermittent asthma without complication    Major depressive disorder in full remission, unspecified whether recurrent    Anxiety                Plan:  Cont current meds      Medication List with Changes/Refills   Current Medications    ADVAIR DISKUS 250-50 MCG/DOSE DISKUS INHALER    INHALE ONE PUFF INTO LUNGS AS NEEDED    ALBUTEROL SULFATE (VENTOLIN HFA INHL)    Inhale 2 puffs into the  lungs every 6 (six) hours as needed.    AZELASTINE (ASTELIN) 137 MCG (0.1 %) NASAL SPRAY    USE ONE SPRAY IN EACH NOSTRIL TWICE DAILY    BUPROPION (WELLBUTRIN XL) 300 MG 24 HR TABLET        CARVEDILOL (COREG) 25 MG TABLET    TAKE 1 TABLET BY MOUTH TWICE DAILY WITH MEALS    CETIRIZINE-PSEUDOEPHEDRINE 5-120 MG TB12    Take 5 mg by mouth 2 (two) times daily.    CHOLECALCIFEROL, VITAMIN D3, (VITAMIN D3) 5,000 UNIT TAB    Take 5,000 Units by mouth once daily.    ESOMEPRAZOLE (NEXIUM) 20 MG CAPSULE    Take 20 mg by mouth once daily.    FLUVOXAMINE (LUVOX) 100 MG TABLET    Take 100 mg by mouth nightly.     GLUCOSAMINE-CHONDROITIN 500-400 MG TABLET    Take 1 tablet by mouth 2 (two) times daily.    IBUPROFEN (ADVIL,MOTRIN) 200 MG TABLET    Take 200 mg by mouth 2 (two) times daily. 4 tab twice daily    KETOCONAZOLE (NIZORAL) 2 % CREAM    APPLY A THIN FILM TO AFFECTED AREA OF GROIN DAILY AS NEEDED FOR FLARE    LOSARTAN (COZAAR) 100 MG TABLET    Take 1 tablet (100 mg total) by mouth once daily.    MONTELUKAST (SINGULAIR) 10 MG TABLET    TAKE 1 TABLET BY MOUTH ONCE DAILY    MULTIVITAMIN (ONE DAILY MULTIVITAMIN) PER TABLET    Take 1 tablet by mouth once daily.   Discontinued Medications    BUPROPION (WELLBUTRIN XL) 150 MG TB24 TABLET    Take 150 mg by mouth once daily.

## 2019-07-03 RX ORDER — LOSARTAN POTASSIUM 100 MG/1
TABLET ORAL
Qty: 90 TABLET | Refills: 3 | Status: SHIPPED | OUTPATIENT
Start: 2019-07-03 | End: 2020-07-13

## 2019-08-05 RX ORDER — MONTELUKAST SODIUM 10 MG/1
TABLET ORAL
Qty: 90 TABLET | Refills: 0 | Status: SHIPPED | OUTPATIENT
Start: 2019-08-05 | End: 2019-10-31 | Stop reason: SDUPTHER

## 2019-08-05 NOTE — PROGRESS NOTES
Refill Authorization Note     is requesting a refill authorization.    Brief assessment and rationale for refill: ROUTE: op   Name and strength of medication: montelukast (SINGULAIR) 10 mg tablet       Medication Therapy Plan: AR- not commented on recently, outside of protocol, route to you     Medication reconciliation completed: No              How patient will take medication: t1t po qd          Comments:   APPOINTMENTS (past 12m or future 3m authorizing provider)   Date Provider   LAST VISIT  6/4/2019 Ignacio Terrell MD   NEXT VISIT  12/2/2019 Ignacio Terrell MD

## 2019-09-25 DIAGNOSIS — J45.909 ASTHMA, UNSPECIFIED ASTHMA SEVERITY, UNSPECIFIED WHETHER COMPLICATED, UNSPECIFIED WHETHER PERSISTENT: Primary | ICD-10-CM

## 2019-09-26 RX ORDER — FLUTICASONE PROPIONATE AND SALMETEROL 250; 50 UG/1; UG/1
POWDER RESPIRATORY (INHALATION)
Qty: 180 EACH | Refills: 2 | Status: SHIPPED | OUTPATIENT
Start: 2019-09-26 | End: 2020-04-09

## 2019-09-26 NOTE — PROGRESS NOTES
Refill Authorization Note     is requesting a refill authorization.    Brief assessment and rationale for refill: APPROVE: prr   Name and strength of medication: FLUTIC/SALMETER 250/50MCG AER       Medication Therapy Plan: LCO/LOV-Asthma controlled-approve 9 more     Medication reconciliation completed: No              How patient will take medication: Inhale 1 dose by mouth bid           Comments:   Appointments past 12m or future 3m    Date Provider   Last Visit   6/4/2019 Ignacio Terrell MD   Next Visit   12/2/2019 Ignacio Terrell MD

## 2019-11-01 RX ORDER — MONTELUKAST SODIUM 10 MG/1
TABLET ORAL
Qty: 90 TABLET | Refills: 1 | Status: SHIPPED | OUTPATIENT
Start: 2019-11-01 | End: 2020-05-05 | Stop reason: SDUPTHER

## 2019-11-01 NOTE — PROGRESS NOTES
Refill Authorization Note     is requesting a refill authorization.    Brief assessment and rationale for refill: APPROVE: prr  Name and strength of medication: MONTELUKAST 10MG TAB            Medication reconciliation completed: No              How patient will take medication: t1t po qd          Comments:   Requested Prescriptions   Pending Prescriptions Disp Refills    montelukast (SINGULAIR) 10 mg tablet [Pharmacy Med Name: MONTELUKAST 10MG TAB] 90 tablet 1     Sig: TAKE 1 TABLET BY MOUTH ONCE DAILY       Pulmonology:  Leukotriene Inhibitors Passed - 10/31/2019  6:22 PM        Passed - Patient is at least 18 years old        Passed - Valid encounter within last 12 months     Recent Outpatient Visits            5 months ago Essential hypertension    [facility] Ignacio Terrell MD    8 months ago Diaphoresis    [facility] Ignacio Terrell MD    10 months ago Urinary tract infection with hematuria, site unspecified    [facility] Marlee Crandall, NP    11 months ago Essential hypertension    [facility] Ignacio Terrell MD    1 year ago Essential hypertension    [facility] Ignacio Terrell MD          Future Appointments              In 1 month Ignacio Terrell MD [facility]                Passed - An appropriate indication is on the problem list     Allergic Rhinitis  Sinusitis  Seasonal Allergies   Asthma

## 2019-11-05 ENCOUNTER — PATIENT OUTREACH (OUTPATIENT)
Dept: ADMINISTRATIVE | Facility: HOSPITAL | Age: 39
End: 2019-11-05

## 2019-11-18 ENCOUNTER — PATIENT OUTREACH (OUTPATIENT)
Dept: ADMINISTRATIVE | Facility: HOSPITAL | Age: 39
End: 2019-11-18

## 2019-12-05 ENCOUNTER — OFFICE VISIT (OUTPATIENT)
Dept: FAMILY MEDICINE | Facility: CLINIC | Age: 39
End: 2019-12-05
Payer: COMMERCIAL

## 2019-12-05 VITALS
SYSTOLIC BLOOD PRESSURE: 142 MMHG | OXYGEN SATURATION: 96 % | WEIGHT: 315 LBS | HEART RATE: 76 BPM | HEIGHT: 72 IN | BODY MASS INDEX: 42.66 KG/M2 | DIASTOLIC BLOOD PRESSURE: 88 MMHG

## 2019-12-05 DIAGNOSIS — R35.89 POLYURIA: Primary | ICD-10-CM

## 2019-12-05 DIAGNOSIS — J45.20 MILD INTERMITTENT ASTHMA WITHOUT COMPLICATION: ICD-10-CM

## 2019-12-05 DIAGNOSIS — F32.5 MAJOR DEPRESSIVE DISORDER IN FULL REMISSION, UNSPECIFIED WHETHER RECURRENT: ICD-10-CM

## 2019-12-05 DIAGNOSIS — I87.2 VENOUS INSUFFICIENCY: ICD-10-CM

## 2019-12-05 DIAGNOSIS — F41.9 ANXIETY: ICD-10-CM

## 2019-12-05 DIAGNOSIS — I10 ESSENTIAL HYPERTENSION: ICD-10-CM

## 2019-12-05 LAB
BILIRUB UR QL STRIP: NEGATIVE
CLARITY UR: CLEAR
COLOR UR: YELLOW
GLUCOSE UR QL STRIP: NEGATIVE
HGB UR QL STRIP: NEGATIVE
KETONES UR QL STRIP: NEGATIVE
LEUKOCYTE ESTERASE UR QL STRIP: NEGATIVE
NITRITE UR QL STRIP: NEGATIVE
PH UR STRIP: 6 [PH] (ref 5–8)
PROT UR QL STRIP: NEGATIVE
SP GR UR STRIP: >=1.03 (ref 1–1.03)
URN SPEC COLLECT METH UR: ABNORMAL

## 2019-12-05 PROCEDURE — 81003 URINALYSIS AUTO W/O SCOPE: CPT | Mod: PO

## 2019-12-05 PROCEDURE — 99214 PR OFFICE/OUTPT VISIT, EST, LEVL IV, 30-39 MIN: ICD-10-PCS | Mod: S$GLB,,, | Performed by: FAMILY MEDICINE

## 2019-12-05 PROCEDURE — 99999 PR PBB SHADOW E&M-EST. PATIENT-LVL III: CPT | Mod: PBBFAC,,, | Performed by: FAMILY MEDICINE

## 2019-12-05 PROCEDURE — 3008F BODY MASS INDEX DOCD: CPT | Mod: CPTII,S$GLB,, | Performed by: FAMILY MEDICINE

## 2019-12-05 PROCEDURE — 99214 OFFICE O/P EST MOD 30 MIN: CPT | Mod: S$GLB,,, | Performed by: FAMILY MEDICINE

## 2019-12-05 PROCEDURE — 99999 PR PBB SHADOW E&M-EST. PATIENT-LVL III: ICD-10-PCS | Mod: PBBFAC,,, | Performed by: FAMILY MEDICINE

## 2019-12-05 PROCEDURE — 3077F SYST BP >= 140 MM HG: CPT | Mod: CPTII,S$GLB,, | Performed by: FAMILY MEDICINE

## 2019-12-05 PROCEDURE — 3077F PR MOST RECENT SYSTOLIC BLOOD PRESSURE >= 140 MM HG: ICD-10-PCS | Mod: CPTII,S$GLB,, | Performed by: FAMILY MEDICINE

## 2019-12-05 PROCEDURE — 87086 URINE CULTURE/COLONY COUNT: CPT

## 2019-12-05 PROCEDURE — 3079F DIAST BP 80-89 MM HG: CPT | Mod: CPTII,S$GLB,, | Performed by: FAMILY MEDICINE

## 2019-12-05 PROCEDURE — 3079F PR MOST RECENT DIASTOLIC BLOOD PRESSURE 80-89 MM HG: ICD-10-PCS | Mod: CPTII,S$GLB,, | Performed by: FAMILY MEDICINE

## 2019-12-05 PROCEDURE — 3008F PR BODY MASS INDEX (BMI) DOCUMENTED: ICD-10-PCS | Mod: CPTII,S$GLB,, | Performed by: FAMILY MEDICINE

## 2019-12-05 RX ORDER — FLUOXETINE HYDROCHLORIDE 20 MG/1
60 CAPSULE ORAL DAILY
Qty: 270 CAPSULE | Refills: 3
Start: 2019-12-05 | End: 2024-03-27

## 2019-12-05 RX ORDER — AMLODIPINE BESYLATE 5 MG/1
5 TABLET ORAL DAILY
Qty: 30 TABLET | Refills: 11 | Status: SHIPPED | OUTPATIENT
Start: 2019-12-05 | End: 2020-07-14

## 2019-12-05 RX ORDER — DOXYCYCLINE 100 MG/1
100 CAPSULE ORAL 2 TIMES DAILY
Qty: 20 CAPSULE | Refills: 0 | Status: SHIPPED | OUTPATIENT
Start: 2019-12-05 | End: 2020-02-04

## 2019-12-05 NOTE — PROGRESS NOTES
Subjective:       Patient ID: Greg Garcia is a 39 y.o. male.    Chief Complaint: Follow-up (6 month)    HPI     Here for a f/u.      bp elevated over the past 3-4 weeks.       Seeing Dr. Olivas, medical psychologist, for anxiety/depression management.  On prozac and wellbutrin.      Asthma controlled.      Reports spraying urine x 2 weeks with associated polyuria.      Reports mild bilat lower ext edema x 4-8 weeks. Worse during day.     Review of Systems      Review of Systems   Constitutional: Negative for fever and chills.   HENT: Negative for hearing loss and neck stiffness.    Eyes: Negative for redness and itching.   Respiratory: Negative for cough and choking.    Cardiovascular: Negative for chest pain and leg swelling.  Abdomen: Negative for abdominal pain and blood in stool.   Genitourinary: Negative for dysuria and flank pain.   Musculoskeletal: Negative for back pain and gait problem.   Neurological: Negative for light-headedness and headaches.   Hematological: Negative for adenopathy.   Psychiatric/Behavioral: Negative for behavioral problems.       Objective:      Physical Exam   HENT:   Head: Atraumatic.   Eyes: Pupils are equal, round, and reactive to light. Conjunctivae are normal.   Neck: Normal range of motion.   Cardiovascular: Normal rate and regular rhythm.   No murmur heard.  Pulmonary/Chest: Effort normal and breath sounds normal. He has no wheezes.   Musculoskeletal:   bilat lower ext: trace edema to above ankles   Lymphadenopathy:     He has no cervical adenopathy.       Assessment:       1. Polyuria    2. Essential hypertension    3. Mild intermittent asthma without complication    4. Major depressive disorder in full remission, unspecified whether recurrent    5. Anxiety    6. Venous insufficiency        Plan:       Polyuria  -     Cancel: Urinalysis; Future; Expected date: 12/05/2019  -     Cancel: Urine culture; Future; Expected date: 12/05/2019  -     Urinalysis; Future;  Expected date: 12/05/2019  -     Urine culture; Future; Expected date: 12/05/2019    Essential hypertension    Mild intermittent asthma without complication    Major depressive disorder in full remission, unspecified whether recurrent    Anxiety    Venous insufficiency    Other orders  -     amLODIPine (NORVASC) 5 MG tablet; Take 1 tablet (5 mg total) by mouth once daily.  Dispense: 30 tablet; Refill: 11  -     FLUoxetine 20 MG capsule; Take 3 capsules (60 mg total) by mouth once daily.  Dispense: 270 capsule; Refill: 3  -     doxycycline (MONODOX) 100 MG capsule; Take 1 capsule (100 mg total) by mouth 2 (two) times daily.  Dispense: 20 capsule; Refill: 0            Plan:  See orders. Start doxy  Start norvasc for bp control  Compression stockings for venous insufficiency  Cont all other meds      Medication List with Changes/Refills   New Medications    AMLODIPINE (NORVASC) 5 MG TABLET    Take 1 tablet (5 mg total) by mouth once daily.    DOXYCYCLINE (MONODOX) 100 MG CAPSULE    Take 1 capsule (100 mg total) by mouth 2 (two) times daily.    FLUOXETINE 20 MG CAPSULE    Take 3 capsules (60 mg total) by mouth once daily.   Current Medications    ALBUTEROL SULFATE (VENTOLIN HFA INHL)    Inhale 2 puffs into the lungs every 6 (six) hours as needed.    AZELASTINE (ASTELIN) 137 MCG (0.1 %) NASAL SPRAY    USE ONE SPRAY IN EACH NOSTRIL TWICE DAILY    BUPROPION (WELLBUTRIN XL) 300 MG 24 HR TABLET        CARVEDILOL (COREG) 25 MG TABLET    TAKE 1 TABLET BY MOUTH TWICE DAILY WITH MEALS    CETIRIZINE-PSEUDOEPHEDRINE 5-120 MG TB12    Take 5 mg by mouth 2 (two) times daily.    CHOLECALCIFEROL, VITAMIN D3, (VITAMIN D3) 5,000 UNIT TAB    Take 5,000 Units by mouth once daily.    ESOMEPRAZOLE (NEXIUM) 20 MG CAPSULE    Take 20 mg by mouth once daily.    FLUTICASONE-SALMETEROL DISKUS INHALER 250-50 MCG    INHALE 1 DOSE BY MOUTH TWICE DAILY    GLUCOSAMINE-CHONDROITIN 500-400 MG TABLET    Take 1 tablet by mouth 2 (two) times daily.     IBUPROFEN (ADVIL,MOTRIN) 200 MG TABLET    Take 200 mg by mouth 2 (two) times daily. 4 tab twice daily    KETOCONAZOLE (NIZORAL) 2 % CREAM    APPLY A THIN FILM TO AFFECTED AREA OF GROIN DAILY AS NEEDED FOR FLARE    LOSARTAN (COZAAR) 100 MG TABLET    TAKE 1 TABLET BY MOUTH ONCE DAILY    MONTELUKAST (SINGULAIR) 10 MG TABLET    TAKE 1 TABLET BY MOUTH ONCE DAILY    MULTIVITAMIN (ONE DAILY MULTIVITAMIN) PER TABLET    Take 1 tablet by mouth once daily.   Discontinued Medications    FLUVOXAMINE (LUVOX) 100 MG TABLET    Take 100 mg by mouth nightly.

## 2019-12-06 LAB
BACTERIA UR CULT: NORMAL
BACTERIA UR CULT: NORMAL

## 2019-12-31 RX ORDER — AZELASTINE 1 MG/ML
SPRAY, METERED NASAL
Qty: 90 ML | Refills: 3 | Status: SHIPPED | OUTPATIENT
Start: 2019-12-31 | End: 2021-08-03 | Stop reason: SDUPTHER

## 2019-12-31 NOTE — PROGRESS NOTES
Refill Authorization Note     is requesting a refill authorization.    Brief assessment and rationale for refill: APPROVE: prr                                         Comments:     Requested Prescriptions   Signed Prescriptions Disp Refills    azelastine (ASTELIN) 137 mcg (0.1 %) nasal spray 90 mL 3     Sig: USE 1 SPRAY(S) IN EACH NOSTRIL TWICE DAILY       Ear, Nose, and Throat: Nasal Preparations - Antiallergy Passed - 12/31/2019  7:39 AM        Passed - Patient is at least 18 years old        Passed - An appropriate indication is on the problem list     Allergic Rhinitis  Sinusitis  Seasonal Allergies              Passed - Office visit in past 12 months or future 90 days     Recent Outpatient Visits            3 weeks ago Polyuria    [facility] Ignacio Trerell MD    7 months ago Essential hypertension    [facility] Ignacio Terrell MD    10 months ago Diaphoresis    [facility]    1 year ago Urinary tract infection with hematuria, site unspecified    [facility] Marlee Crandall, NP    1 year ago Essential hypertension    [facility] Ignacio Terrell MD          Future Appointments              In 5 months Ignacio Terrell MD [facility]

## 2020-01-17 ENCOUNTER — PATIENT MESSAGE (OUTPATIENT)
Dept: FAMILY MEDICINE | Facility: CLINIC | Age: 40
End: 2020-01-17

## 2020-01-20 ENCOUNTER — LAB VISIT (OUTPATIENT)
Dept: LAB | Facility: HOSPITAL | Age: 40
End: 2020-01-20
Attending: NURSE PRACTITIONER
Payer: COMMERCIAL

## 2020-01-20 ENCOUNTER — OFFICE VISIT (OUTPATIENT)
Dept: FAMILY MEDICINE | Facility: CLINIC | Age: 40
End: 2020-01-20
Payer: COMMERCIAL

## 2020-01-20 VITALS
OXYGEN SATURATION: 98 % | HEART RATE: 72 BPM | WEIGHT: 315 LBS | DIASTOLIC BLOOD PRESSURE: 98 MMHG | HEIGHT: 72 IN | BODY MASS INDEX: 42.66 KG/M2 | SYSTOLIC BLOOD PRESSURE: 144 MMHG | TEMPERATURE: 99 F

## 2020-01-20 DIAGNOSIS — E66.01 MORBID OBESITY: ICD-10-CM

## 2020-01-20 DIAGNOSIS — R35.1 NOCTURIA: ICD-10-CM

## 2020-01-20 DIAGNOSIS — R33.9 INCOMPLETE BLADDER EMPTYING: ICD-10-CM

## 2020-01-20 DIAGNOSIS — N41.9 PROSTATITIS, UNSPECIFIED PROSTATITIS TYPE: Primary | ICD-10-CM

## 2020-01-20 LAB
BACTERIA #/AREA URNS HPF: ABNORMAL /HPF
BILIRUB UR QL STRIP: NEGATIVE
CLARITY UR: CLEAR
COLOR UR: YELLOW
GLUCOSE UR QL STRIP: NEGATIVE
HGB UR QL STRIP: NEGATIVE
KETONES UR QL STRIP: NEGATIVE
LEUKOCYTE ESTERASE UR QL STRIP: ABNORMAL
MICROSCOPIC COMMENT: ABNORMAL
NITRITE UR QL STRIP: NEGATIVE
PH UR STRIP: 6 [PH] (ref 5–8)
PROT UR QL STRIP: NEGATIVE
RBC #/AREA URNS HPF: 1 /HPF (ref 0–4)
SP GR UR STRIP: >=1.03 (ref 1–1.03)
SQUAMOUS #/AREA URNS HPF: 5 /HPF
URN SPEC COLLECT METH UR: ABNORMAL
WBC #/AREA URNS HPF: 8 /HPF (ref 0–5)

## 2020-01-20 PROCEDURE — 99214 OFFICE O/P EST MOD 30 MIN: CPT | Mod: S$GLB,,, | Performed by: NURSE PRACTITIONER

## 2020-01-20 PROCEDURE — 3008F PR BODY MASS INDEX (BMI) DOCUMENTED: ICD-10-PCS | Mod: CPTII,S$GLB,, | Performed by: NURSE PRACTITIONER

## 2020-01-20 PROCEDURE — 87147 CULTURE TYPE IMMUNOLOGIC: CPT

## 2020-01-20 PROCEDURE — 99214 PR OFFICE/OUTPT VISIT, EST, LEVL IV, 30-39 MIN: ICD-10-PCS | Mod: S$GLB,,, | Performed by: NURSE PRACTITIONER

## 2020-01-20 PROCEDURE — 3080F DIAST BP >= 90 MM HG: CPT | Mod: CPTII,S$GLB,, | Performed by: NURSE PRACTITIONER

## 2020-01-20 PROCEDURE — 84153 ASSAY OF PSA TOTAL: CPT

## 2020-01-20 PROCEDURE — 87088 URINE BACTERIA CULTURE: CPT

## 2020-01-20 PROCEDURE — 99999 PR PBB SHADOW E&M-EST. PATIENT-LVL IV: ICD-10-PCS | Mod: PBBFAC,,, | Performed by: NURSE PRACTITIONER

## 2020-01-20 PROCEDURE — 3008F BODY MASS INDEX DOCD: CPT | Mod: CPTII,S$GLB,, | Performed by: NURSE PRACTITIONER

## 2020-01-20 PROCEDURE — 87086 URINE CULTURE/COLONY COUNT: CPT

## 2020-01-20 PROCEDURE — 81000 URINALYSIS NONAUTO W/SCOPE: CPT | Mod: PO

## 2020-01-20 PROCEDURE — 3077F PR MOST RECENT SYSTOLIC BLOOD PRESSURE >= 140 MM HG: ICD-10-PCS | Mod: CPTII,S$GLB,, | Performed by: NURSE PRACTITIONER

## 2020-01-20 PROCEDURE — 3080F PR MOST RECENT DIASTOLIC BLOOD PRESSURE >= 90 MM HG: ICD-10-PCS | Mod: CPTII,S$GLB,, | Performed by: NURSE PRACTITIONER

## 2020-01-20 PROCEDURE — 3077F SYST BP >= 140 MM HG: CPT | Mod: CPTII,S$GLB,, | Performed by: NURSE PRACTITIONER

## 2020-01-20 PROCEDURE — 36415 COLL VENOUS BLD VENIPUNCTURE: CPT | Mod: PO

## 2020-01-20 PROCEDURE — 99999 PR PBB SHADOW E&M-EST. PATIENT-LVL IV: CPT | Mod: PBBFAC,,, | Performed by: NURSE PRACTITIONER

## 2020-01-20 RX ORDER — SULFAMETHOXAZOLE AND TRIMETHOPRIM 800; 160 MG/1; MG/1
1 TABLET ORAL 2 TIMES DAILY
Qty: 28 TABLET | Refills: 0 | Status: SHIPPED | OUTPATIENT
Start: 2020-01-20 | End: 2020-01-22 | Stop reason: ALTCHOICE

## 2020-01-20 NOTE — PROGRESS NOTES
Subjective:       Patient ID: Greg Garcia is a 39 y.o. male.    Chief Complaint: ongoing uti    HPI   Mr. Garcia is a known patient to me. He reports malaise and fatigue x 2 weeks. He reports waxing and waning dysuria, incomplete bladder emptying and frequency/nocturia for 3 months. Last Wednesday he was having severe lower abdominal cramping--went to [facility] and was diagnosed with colitis. Has not had similar pain since. He is not sexually active and not at risk for STDs.      Vitals:    01/20/20 1440   BP: (!) 144/98   Pulse: 72   Temp: 98.7 °F (37.1 °C)     Review of Systems   Constitutional: Positive for activity change and appetite change. Negative for diaphoresis, fever and unexpected weight change.   HENT: Negative for facial swelling, hearing loss, rhinorrhea and trouble swallowing.    Eyes: Negative for discharge, redness and visual disturbance.   Respiratory: Negative for cough, chest tightness, shortness of breath and wheezing.    Cardiovascular: Negative for chest pain and palpitations.   Gastrointestinal: Negative for abdominal pain, blood in stool and vomiting.   Endocrine: Positive for polydipsia and polyuria.   Genitourinary: Positive for difficulty urinating, hematuria and urgency.   Musculoskeletal: Negative for gait problem, joint swelling and neck pain.   Skin: Negative for pallor and rash.   Neurological: Negative for facial asymmetry, speech difficulty and headaches.   Psychiatric/Behavioral: Negative for confusion and dysphoric mood. The patient is not nervous/anxious.        Past Medical History:   Diagnosis Date    AR (allergic rhinitis)     Asthma     Cervicalgia     Depression     sees Amy Olivas, psychologist    JOHN (obstructive sleep apnea)     cpap     Objective:      Physical Exam   Constitutional: He is oriented to person, place, and time. He does not have a sickly appearance. No distress.   HENT:   Head: Normocephalic.   Right Ear: Hearing normal.   Left Ear:  Hearing normal.   Nose: Nose normal.   Eyes: Conjunctivae and lids are normal.   Neck: No JVD present. No tracheal deviation present.   Cardiovascular: Normal rate and normal heart sounds.   Pulmonary/Chest: Effort normal and breath sounds normal.   Abdominal: Soft. He exhibits no distension. There is no tenderness.   Musculoskeletal: He exhibits no deformity.   Neurological: He is alert and oriented to person, place, and time.   Skin: He is not diaphoretic. No pallor.   Psychiatric: He has a normal mood and affect. His speech is normal and behavior is normal. Judgment and thought content normal. Cognition and memory are normal.   Nursing note and vitals reviewed.      Assessment:       1. Prostatitis, unspecified prostatitis type    2. Incomplete bladder emptying    3. Nocturia    4. Morbid obesity        Plan:       Prostatitis, unspecified prostatitis type  -     sulfamethoxazole-trimethoprim 800-160mg (BACTRIM DS) 800-160 mg Tab; Take 1 tablet by mouth 2 (two) times daily. for 14 days  Dispense: 28 tablet; Refill: 0  -     Ambulatory referral to Urology    Incomplete bladder emptying  -     Urinalysis; Future; Expected date: 01/20/2020  -     Cancel: Urinalysis Microscopic; Future; Expected date: 01/20/2020  -     Urine culture; Future; Expected date: 01/20/2020    Nocturia  -     Urinalysis; Future; Expected date: 01/20/2020  -     Cancel: Urinalysis Microscopic; Future; Expected date: 01/20/2020  -     Urine culture; Future; Expected date: 01/20/2020  -     PROSTATE SPECIFIC ANTIGEN, DIAGNOSTIC; Future; Expected date: 01/20/2020    Morbid obesity    Other orders  -     Urinalysis Microscopic  .  FU 2 weeks with urology following completion of bactrim  Notify me if symptoms worsen or new symptoms arise.         Medication List with Changes/Refills   New Medications    SULFAMETHOXAZOLE-TRIMETHOPRIM 800-160MG (BACTRIM DS) 800-160 MG TAB    Take 1 tablet by mouth 2 (two) times daily. for 14 days   Current  Medications    ALBUTEROL SULFATE (VENTOLIN HFA INHL)    Inhale 2 puffs into the lungs every 6 (six) hours as needed.    AMLODIPINE (NORVASC) 5 MG TABLET    Take 1 tablet (5 mg total) by mouth once daily.    AZELASTINE (ASTELIN) 137 MCG (0.1 %) NASAL SPRAY    USE 1 SPRAY(S) IN EACH NOSTRIL TWICE DAILY    BUPROPION (WELLBUTRIN XL) 300 MG 24 HR TABLET        CARVEDILOL (COREG) 25 MG TABLET    TAKE 1 TABLET BY MOUTH TWICE DAILY WITH MEALS    CETIRIZINE-PSEUDOEPHEDRINE 5-120 MG TB12    Take 5 mg by mouth 2 (two) times daily.    CHOLECALCIFEROL, VITAMIN D3, (VITAMIN D3) 5,000 UNIT TAB    Take 5,000 Units by mouth once daily.    DICYCLOMINE (BENTYL) 20 MG TABLET    Take 1 tablet (20 mg total) by mouth 2 (two) times daily. for 10 days    DOXYCYCLINE (MONODOX) 100 MG CAPSULE    Take 1 capsule (100 mg total) by mouth 2 (two) times daily.    ESOMEPRAZOLE (NEXIUM) 20 MG CAPSULE    Take 20 mg by mouth once daily.    FLUOXETINE 20 MG CAPSULE    Take 3 capsules (60 mg total) by mouth once daily.    FLUTICASONE-SALMETEROL DISKUS INHALER 250-50 MCG    INHALE 1 DOSE BY MOUTH TWICE DAILY    GLUCOSAMINE-CHONDROITIN 500-400 MG TABLET    Take 1 tablet by mouth 2 (two) times daily.    IBUPROFEN (ADVIL,MOTRIN) 200 MG TABLET    Take 200 mg by mouth 2 (two) times daily. 4 tab twice daily    KETOCONAZOLE (NIZORAL) 2 % CREAM    APPLY A THIN FILM TO AFFECTED AREA OF GROIN DAILY AS NEEDED FOR FLARE    LOSARTAN (COZAAR) 100 MG TABLET    TAKE 1 TABLET BY MOUTH ONCE DAILY    MONTELUKAST (SINGULAIR) 10 MG TABLET    TAKE 1 TABLET BY MOUTH ONCE DAILY    MULTIVITAMIN (ONE DAILY MULTIVITAMIN) PER TABLET    Take 1 tablet by mouth once daily.    ONDANSETRON (ZOFRAN) 4 MG TABLET    Take 1 tablet (4 mg total) by mouth every 6 (six) hours.

## 2020-01-21 ENCOUNTER — PATIENT MESSAGE (OUTPATIENT)
Dept: FAMILY MEDICINE | Facility: CLINIC | Age: 40
End: 2020-01-21

## 2020-01-21 LAB — COMPLEXED PSA SERPL-MCNC: 1.3 NG/ML (ref 0–4)

## 2020-01-22 ENCOUNTER — PATIENT MESSAGE (OUTPATIENT)
Dept: FAMILY MEDICINE | Facility: CLINIC | Age: 40
End: 2020-01-22

## 2020-01-22 DIAGNOSIS — R82.71 GROUP B STREPTOCOCCAL BACTERIURIA: Primary | ICD-10-CM

## 2020-01-22 LAB — BACTERIA UR CULT: ABNORMAL

## 2020-01-22 RX ORDER — AMOXICILLIN AND CLAVULANATE POTASSIUM 875; 125 MG/1; MG/1
1 TABLET, FILM COATED ORAL 2 TIMES DAILY
Qty: 20 TABLET | Refills: 0 | Status: SHIPPED | OUTPATIENT
Start: 2020-01-22 | End: 2020-02-01

## 2020-02-04 ENCOUNTER — OFFICE VISIT (OUTPATIENT)
Dept: UROLOGY | Facility: CLINIC | Age: 40
End: 2020-02-04
Payer: COMMERCIAL

## 2020-02-04 ENCOUNTER — PATIENT MESSAGE (OUTPATIENT)
Dept: UROLOGY | Facility: CLINIC | Age: 40
End: 2020-02-04

## 2020-02-04 VITALS — BODY MASS INDEX: 42.66 KG/M2 | HEIGHT: 72 IN | WEIGHT: 315 LBS

## 2020-02-04 DIAGNOSIS — R33.9 INCOMPLETE BLADDER EMPTYING: ICD-10-CM

## 2020-02-04 DIAGNOSIS — Z12.5 SCREENING FOR PROSTATE CANCER: Primary | ICD-10-CM

## 2020-02-04 DIAGNOSIS — N39.41 URGE INCONTINENCE: ICD-10-CM

## 2020-02-04 DIAGNOSIS — R35.0 URINARY FREQUENCY: ICD-10-CM

## 2020-02-04 DIAGNOSIS — R35.1 NOCTURIA: ICD-10-CM

## 2020-02-04 PROCEDURE — 3008F PR BODY MASS INDEX (BMI) DOCUMENTED: ICD-10-PCS | Mod: CPTII,S$GLB,, | Performed by: UROLOGY

## 2020-02-04 PROCEDURE — 99205 OFFICE O/P NEW HI 60 MIN: CPT | Mod: S$GLB,,, | Performed by: UROLOGY

## 2020-02-04 PROCEDURE — 99999 PR PBB SHADOW E&M-EST. PATIENT-LVL III: CPT | Mod: PBBFAC,,, | Performed by: UROLOGY

## 2020-02-04 PROCEDURE — 51798 PR MEAS,POST-VOID RES,US,NON-IMAGING: ICD-10-PCS | Mod: S$GLB,,, | Performed by: UROLOGY

## 2020-02-04 PROCEDURE — 3008F BODY MASS INDEX DOCD: CPT | Mod: CPTII,S$GLB,, | Performed by: UROLOGY

## 2020-02-04 PROCEDURE — 51798 US URINE CAPACITY MEASURE: CPT | Mod: S$GLB,,, | Performed by: UROLOGY

## 2020-02-04 PROCEDURE — 99205 PR OFFICE/OUTPT VISIT, NEW, LEVL V, 60-74 MIN: ICD-10-PCS | Mod: S$GLB,,, | Performed by: UROLOGY

## 2020-02-04 PROCEDURE — 99999 PR PBB SHADOW E&M-EST. PATIENT-LVL III: ICD-10-PCS | Mod: PBBFAC,,, | Performed by: UROLOGY

## 2020-02-04 NOTE — LETTER
February 4, 2020      Marlee Crandall, CHRIS  [address] [facility] [address] [place] [address]           [facility]  [address] [place] [address]  Phone: [phone]  Fax: [phone]          Patient: Greg Garcia   MR Number: [id number]   YOB: 1980   Date of Visit: 2/4/2020       Dear Marlee Crandall:    Thank you for referring Greg Garcia to me for evaluation. Attached you will find relevant portions of my assessment and plan of care.    If you have questions, please do not hesitate to call me. I look forward to following Greg Garcia along with you.    Sincerely,    Baron Tabares MD    Enclosure  CC:  No Recipients    If you would like to receive this communication electronically, please contact [email or web address] or [phone] to request more information on [facility] access.    For providers and/or their staff who would like to refer a patient to [facility], please contact us through our one-stop-shop provider referral line, [facility], at [phone].    If you feel you have received this communication in error or would no longer like to receive these types of communications, please e-mail [email or web address]

## 2020-02-04 NOTE — PROGRESS NOTES
UROLOGY [place]  2 4 20    Cc urinary frequency    Age 39, says has had bouts of prostatitis from time to time, recently took an antibiotic for one, feels better. Pt gives a hx of nocturnal enuresis during childhood that lasted to age 15, then subsided. He has a tendency to void frequently, and with urgency. At times has had small amount of urine loss with urgency. No pains when voiding or ejaculation at this time. No fever.     His stream overall feels a bit weakened, like 'he pushes the urine through something', and has intermittency. Not sure he can empty the bladder well.          PMH    Surgical:  circumcision    Medical:  has a past medical history of AR (allergic rhinitis), Asthma, Cervicalgia, Depression, JOHN (obstructive sleep apnea), and Primary nocturnal enuresis. massive obesity BMI 58    Familial: no fh of renal disease    Social: single, lives in [place]    Meds:   Current Outpatient Medications on File Prior to Visit   Medication Sig Dispense Refill    albuterol sulfate (VENTOLIN HFA INHL) Inhale 2 puffs into the lung      amLODIPine (NORVA  30 tablet 11    azelastine (ASTELIN) 137 mcg (0.1 %) nasal spray USE 1 SPRAY(S) IN EACH 90 mL 3    buPROPion (WELLBUTRIN XL) 300 MG 24 hr tablet       carvedilol (COREG) 25 MG tablet TAKE 1 TABLEH MEALS 180 tablet 3    cetirizine-pseudoephedrine 5-120 mg Tb12 Take 5 mg by mouth 2 (two) 48 tablet 2    cholecalciferol, vitamin D3, (VITAMIN D3) 5,000 unit Tab Take 5,000 Units by mouth once daily.      esomeprazole (NEXIUM) 20 MG capsule Take 20 mg by mouth once daily.      FLUoxetine 20 MG capsule Take 3 capsul 270 capsule 3    fluticasone-salmeterol diskus inhaler 250-50 mcg INHALE 1 DOSE BY MOUTH 180 each 2    glucosamine-chondroitin 500-400 mg tablet Take 1 tablet by mouth 2 (two)      ibuprofen (ADVIL,MOTRIN) 200 MG tablet Take 200 mg by mouth 2 (tw      ketoconazole (NIZORAL) 2 % cream APPLY A THIN FILM TO AF 60 g 5    losartan (COZAAR) 100  MG tablet TAKE 1 TABLET BY MOUTH ONC 90 tablet 3    montelukast (SINGULAIR) 10 mg tablet TAKE 1 TABLET BY MOUTH O 90 tablet 1    multivitamin (ONE DAILY MULTIVITAMIN) per tablet Take 1 tablet by mouth once daily.      ondansetron (ZOFRAN) 4 MG tablet Take 1 tablet (4 mg total) b 12 tablet 0    [DISCONTINUED] doxycycline (MONODOX) 100 MG capsule Take 1 capsule (100 mg total 20 capsule 0     REVIEW OF SYSTEMS  GENERAL:  No headaches or dizzy spells.   HEENT: vision preserved. Sinuses: has complaints.   CARDIOPULMONARY: No swelling of the legs; no chest pain. No shortness of breath.   GASTROINTESTINAL: has heartburn. Denies diarrhea; occasional constipation, no blood or mucus in stools.   GENITOURINARY: Denies dysuria, bleeding or incontinence.   MUSCULOSKELETAL: No arthritic complaints such as pain or stiffness.   PSYCHIATRIC: No history of depression or anxiety.   ENDOCRINOLOGIC: has morbid obesity. No reports of sweating, cold or heat intolerance. No polyuria or polydipsia.   NEUROLOGICAL: No dizziness, syncope, paralysis  LYMPHATICS: No enlarged nodes. No history of splenectomy.  ==    Pt alert, oriented, no distress  HEENT: wnl.  Neck: supple, no JVD, no lymphadenopathy  Chest: CV NSR  Lungs: normal chest expansion, no labored breathing  Abdomen prominent, nontender, no organomegaly, no masses.  No hernias  Penis circumcised, meatus nl  Testes nl, epi nl, scrotum nl  GENESIS: anus nl, sphincter nl tone, mucosa without lesions, prostate 30 gm, no midline furrow, no nodules or indurations  Extremities: no edema, peripheral pulses nl  Neuro: preserved    BLADDERSCAN ULTRASOUND  0  ml residual      IMP    Urinary frequency, urge incontinence, overactive bladder  Nocturia  Hx of nocturia enuresis until age 15  Hx of acute prostatitis  Feeling of incomplete bladder emptying  Routine psa (screening for prostate ca)    I am starting pt on myrbetriq 50 qd. If cannot purchase this, can give generic ditropan xl 10 qd  CT  urogram may remain a possibility if pt continues with frequent infections.

## 2020-02-05 ENCOUNTER — PATIENT MESSAGE (OUTPATIENT)
Dept: UROLOGY | Facility: CLINIC | Age: 40
End: 2020-02-05

## 2020-02-05 DIAGNOSIS — R35.1 NOCTURIA: ICD-10-CM

## 2020-02-05 DIAGNOSIS — N39.41 URGE INCONTINENCE: ICD-10-CM

## 2020-02-05 DIAGNOSIS — R35.0 URINARY FREQUENCY: Primary | ICD-10-CM

## 2020-02-05 RX ORDER — OXYBUTYNIN CHLORIDE 10 MG/1
10 TABLET, EXTENDED RELEASE ORAL DAILY
Qty: 30 TABLET | Refills: 11 | Status: SHIPPED | OUTPATIENT
Start: 2020-02-05 | End: 2021-02-18 | Stop reason: SDUPTHER

## 2020-04-06 DIAGNOSIS — J45.909 ASTHMA, UNSPECIFIED ASTHMA SEVERITY, UNSPECIFIED WHETHER COMPLICATED, UNSPECIFIED WHETHER PERSISTENT: ICD-10-CM

## 2020-04-09 RX ORDER — FLUTICASONE PROPIONATE AND SALMETEROL 250; 50 UG/1; UG/1
POWDER RESPIRATORY (INHALATION)
Qty: 180 EACH | Refills: 0 | Status: SHIPPED | OUTPATIENT
Start: 2020-04-09 | End: 2020-06-09 | Stop reason: SDUPTHER

## 2020-04-09 NOTE — TELEPHONE ENCOUNTER
I have reviewed the assessment below.  Pt has seen [facility] since previous ED visit. FOVS. Approve 3 more..  Thanks

## 2020-04-09 NOTE — PROGRESS NOTES
Refill Authorization Note     is requesting a refill authorization.    Brief assessment and rationale for refill: REVIEW: recent ED visit          Medication Therapy Plan: FOVS; Recent ED visit(colitis-1/15/20), review    Medication reconciliation completed: No   Pharmacist Review Requested: Yes                     Comments:   Refill Center Care Gap Closure protocols temporarily suspended.   Requested Prescriptions   Pending Prescriptions Disp Refills    fluticasone-salmeterol diskus inhaler 250-50 mcg [Pharmacy Med Name: Fluticasone-Salmeterol 250-50 MCG/DOSE Inhalation Aerosol Powder Breath Activated] 180 each 3     Sig: INHALE 1 DOSE BY MOUTH TWICE DAILY       Pulmonology:  Combination Products Passed - 4/6/2020  6:11 AM        Passed - Patient is at least 18 years old        Passed - Patient has applicable pulmonary diagnosis on their problem list        Passed - There is a short-acting beta agonist medication on the active medication list        Passed - Last Heart Rate in normal range within 360 days.     Pulse Readings from Last 3 Encounters:   01/20/20 72   01/15/20 61   12/05/19 76             Passed - Office visit in past 12 months or future 90 days.     Recent Outpatient Visits            2 months ago Screening for prostate cancer    [facility] Baron Tabares MD    2 months ago Prostatitis, unspecified prostatitis type    [facility] Marlee Crandall NP    4 months ago Polyuria    [facility] Ignacio Terrell MD    10 months ago Essential hypertension    [facility] Ignacio Terrell MD    1 year ago Diaphoresis    [facility] Nahun Terrell [facility]          Future Appointments              In 2 months Ignacio Terrell MD [facility]                 Appointments  past 12m or future 3m with PCP    Date Provider   Last Visit   12/5/2019 Ignacio Terrell MD   Next Visit    6/8/2020 Ignacio Terrell MD   .  ED visits in past 90 days: 1       Note composed:10:46 AM 04/09/2020

## 2020-05-05 ENCOUNTER — PATIENT MESSAGE (OUTPATIENT)
Dept: ADMINISTRATIVE | Facility: HOSPITAL | Age: 40
End: 2020-05-05

## 2020-05-06 RX ORDER — MONTELUKAST SODIUM 10 MG/1
10 TABLET ORAL DAILY
Qty: 90 TABLET | Refills: 1 | Status: SHIPPED | OUTPATIENT
Start: 2020-05-06 | End: 2020-11-17

## 2020-05-06 RX ORDER — MONTELUKAST SODIUM 10 MG/1
TABLET ORAL
Qty: 90 TABLET | Refills: 0 | OUTPATIENT
Start: 2020-05-06

## 2020-05-06 NOTE — PROGRESS NOTES
Quick DC. Duplicate Request   Refill Authorization Note    is requesting a refill authorization.    Brief assessment and rationale for refill: QUICK DC: Duplicate               Medication reconciliation completed: No                          Comments:   Pended Medication(s)   Requested Prescriptions     Pending Prescriptions Disp Refills    montelukast (SINGULAIR) 10 mg tablet [Pharmacy Med Name: Montelukast Sodium 10 MG Oral Tablet] 90 tablet 0     Sig: Take 1 tablet by mouth once daily        Duplicate Pended Encounter(s)/ Last Prescribed Details:    Ordering User:  Marlee Crandall NP               Original Order:  montelukast (SINGULAIR) 10 mg tablet [[id number]]      Pharmacy:  [facility] [address] [place] [address] [address]:  --     Pharmacy Comments:  --          Fill quantity remaining:  -- Fill quantity used:  -- Next fill due: --       Outpatient Medication Detail      Disp Refills Start End    montelukast (SINGULAIR) 10 mg tablet 90 tablet 1 5/6/2020     Sig - Route: Take 1 tablet (10 mg total) by mouth once daily. - Oral    Sent to pharmacy as: montelukast (SINGULAIR) 10 mg tablet    E-Prescribing Status: Receipt confirmed by pharmacy (5/6/2020  7:51 AM CDT)    Ordering Encounter Report     Associated Reports   View Encounter                Note composed:11:04 AM 05/06/2020

## 2020-05-26 ENCOUNTER — PATIENT OUTREACH (OUTPATIENT)
Dept: ADMINISTRATIVE | Facility: HOSPITAL | Age: 40
End: 2020-05-26

## 2020-05-26 NOTE — PROGRESS NOTES
Chart review completed 05/26/2020.  Care Everywhere updates requested and reviewed.  Immunizations reconciled. Media reviewed.

## 2020-06-09 DIAGNOSIS — J45.909 ASTHMA, UNSPECIFIED ASTHMA SEVERITY, UNSPECIFIED WHETHER COMPLICATED, UNSPECIFIED WHETHER PERSISTENT: ICD-10-CM

## 2020-06-10 ENCOUNTER — PATIENT OUTREACH (OUTPATIENT)
Dept: ADMINISTRATIVE | Facility: HOSPITAL | Age: 40
End: 2020-06-10

## 2020-06-10 RX ORDER — CARVEDILOL 25 MG/1
TABLET ORAL
Qty: 180 TABLET | Refills: 0 | OUTPATIENT
Start: 2020-06-10

## 2020-06-10 RX ORDER — FLUTICASONE PROPIONATE AND SALMETEROL 250; 50 UG/1; UG/1
POWDER RESPIRATORY (INHALATION)
Refills: 0 | OUTPATIENT
Start: 2020-06-10

## 2020-06-10 NOTE — TELEPHONE ENCOUNTER
Quick DC. Duplicate Request   Refill Authorization Note    is requesting a refill authorization.    Brief assessment and rationale for refill: QUICK DC: duplicate               Medication reconciliation completed: No                          Comments:   Pended Medication(s)   Requested Prescriptions     Refused Prescriptions Disp Refills    fluticasone-salmeterol diskus inhaler 250-50 mcg [Pharmacy Med Name: Fluticasone-Salmeterol 250-50 MCG/DOSE Inhalation Aerosol Powder Breath Activated]  0     Sig: INHALE 1 DOSE BY MOUTH TWICE DAILY     Refused By: FRANCISCA ALLEN     Reason for Refusal: Request already responded to by other means (e.g. phone or fax)    carvediloL (COREG) 25 MG tablet [Pharmacy Med Name: Carvedilol 25 MG Oral Tablet] 180 tablet 0     Sig: TAKE 1 TABLET BY MOUTH TWICE DAILY WITH MEALS     Refused By: FRANCISCA ALLEN     Reason for Refusal: Request already responded to by other means (e.g. phone or fax)        Duplicate Pended Encounter(s)/ Last Prescribed Details:    Pharmacy:  [facility] [address] [place] [address] [address] [facility] [address]:  --     Pharmacy Comments:  --          Fill quantity remaining:  -- Fill quantity used:  -- Next fill due: --       Outpatient Medication Detail      Disp Refills Start End    carvediloL (COREG) 25 MG tablet 180 tablet 3 6/10/2020     Sig - Route: Take 1 tablet (25 mg total) by mouth 2 (two) times daily with meals. - Oral    Sent to pharmacy as: carvediloL (COREG) 25 MG tablet    Notes to Pharmacy: .    E-Prescribing Status: Receipt confirmed by pharmacy (6/10/2020  3:20 PM CDT)    Ordering Encounter Report     Associated Reports   View Encounter     Pharmacy:  [facility] [address] [place] [address] [address] [facility] [address]:  --     Pharmacy Comments:  --          Fill quantity remaining:  -- Fill quantity used:  -- Next fill due: --       Outpatient Medication Detail      Disp Refills Start End    fluticasone-salmeterol diskus inhaler 250-50 mcg 180  each 0 6/10/2020     Sig: Controller    Sent to pharmacy as: fluticasone-salmeterol 250-50 mcg/dose (ADVAIR) 250-50 mcg/dose diskus inhaler    Notes to Pharmacy: Please delete all prior scripts with same name and strength including on holds.    E-Prescribing Status: Receipt confirmed by pharmacy (6/10/2020  3:20 PM CDT)    Ordering Encounter Report     Associated Reports   View Encounter                Note composed:3:22 PM 06/10/2020

## 2020-06-10 NOTE — PROGRESS NOTES
Chart review completed 06/10/2020.  Care Everywhere updates requested and reviewed.  Immunizations reconciled. Media reviewed.     DIS, [facility], and [employer] reviewed    Health Maintenance Due   Topic Date Due    HIV Screening  05[birth year]1995

## 2020-07-09 ENCOUNTER — PATIENT OUTREACH (OUTPATIENT)
Dept: ADMINISTRATIVE | Facility: HOSPITAL | Age: 40
End: 2020-07-09

## 2020-07-13 NOTE — PROGRESS NOTES
Refill Routing Note   Medication(s) are not appropriate for processing by [facility]:       - Patient has been seen in the Emergency Room/Department since the last PCP visit        Medication Therapy Plan: FOVS; ED VISIT(1/15/20-COLITIS), DEFER TO YOU; BP-ELEVATED(144/98) AT [facility] MEDICINE OV(ONGOING UTI), NO VITALS FILED AT LOV(12/5/19), PREVIOUSLY OV(6/19) BP-WNL  Medication reconciliation completed: No      Automatic Epic Generated Protocol Data:    Requested Prescriptions   Pending Prescriptions Disp Refills    losartan (COZAAR) 100 MG tablet [Pharmacy Med Name: Losartan Potassium 100 MG Oral Tablet] 90 tablet 0     Sig: Take 1 tablet by mouth once daily       Cardiovascular:  Angiotensin Receptor Blockers Failed - 7/12/2020 11:57 PM        Failed - Last BP in normal range within 360 days.     BP Readings from Last 3 Encounters:   01/20/20 (!) 144/98   01/15/20 138/64   12/05/19 (!) 142/88              Passed - Patient is at least 18 years old        Passed - Office visit in past 12 months or future 90 days.     Recent Outpatient Visits            5 months ago Screening for prostate cancer    [facility] Barno Tabares MD    5 months ago Prostatitis, unspecified prostatitis type    [facility] Marlee Crandall NP    7 months ago Polyuria    [facility] Ignacio Terrell MD    1 year ago Essential hypertension    [facility] Ignacio Terrell MD    1 year ago Diaphoresis    [facility] Ignacio Terrell MD          Future Appointments              Tomorrow Ignacio Terrell MD [facility]                Passed - Cr is 1.3 or below and within 360 days     Creatinine   Date Value Ref Range Status   01/15/2020 0.98 0.50 - 1.40 mg/dL Final   12/18/2018 1.0 0.5 - 1.4 mg/dL Final   12/13/2017 1.0 0.5 - 1.4 mg/dL Final              Passed - K in normal range and within 360 days     Potassium    Date Value Ref Range Status   01/15/2020 4.1 3.5 - 5.1 mmol/L Final   12/18/2018 4.2 3.5 - 5.1 mmol/L Final   12/13/2017 3.6 3.5 - 5.1 mmol/L Final              Passed - eGFR within 360 days     eGFR if non    Date Value Ref Range Status   01/15/2020 >60 >60 mL/min/1.73 m^2 Final     Comment:     Calculation used to obtain the estimated glomerular filtration  rate (eGFR) is the CKD-EPI equation.      12/18/2018 >60.0 >60 mL/min/1.73 m^2 Final     Comment:     Calculation used to obtain the estimated glomerular filtration  rate (eGFR) is the CKD-EPI equation.      12/13/2017 >60.0 >60 mL/min/1.73 m^2 Final     Comment:     Calculation used to obtain the estimated glomerular filtration  rate (eGFR) is the CKD-EPI equation.        eGFR if    Date Value Ref Range Status   01/15/2020 >60 >60 mL/min/1.73 m^2 Final   12/18/2018 >60.0 >60 mL/min/1.73 m^2 Final   12/13/2017 >60.0 >60 mL/min/1.73 m^2 Final                    Appointments  past 12m or future 3m with PCP    Date Provider   Last Visit   12/5/2019 Ignacio Terrell MD   Next Visit   7/14/2020 Ignacio Terrell MD   ED visits in past 90 days: 0     Note composed:2:24 PM 07/13/2020

## 2020-07-14 ENCOUNTER — OFFICE VISIT (OUTPATIENT)
Dept: FAMILY MEDICINE | Facility: CLINIC | Age: 40
End: 2020-07-14
Payer: COMMERCIAL

## 2020-07-14 VITALS
BODY MASS INDEX: 42.66 KG/M2 | TEMPERATURE: 98 F | HEART RATE: 85 BPM | SYSTOLIC BLOOD PRESSURE: 136 MMHG | DIASTOLIC BLOOD PRESSURE: 82 MMHG | WEIGHT: 315 LBS | HEIGHT: 72 IN | OXYGEN SATURATION: 96 %

## 2020-07-14 DIAGNOSIS — Z12.83 SKIN CANCER SCREENING: ICD-10-CM

## 2020-07-14 DIAGNOSIS — I10 HYPERTENSION, UNSPECIFIED TYPE: Primary | ICD-10-CM

## 2020-07-14 DIAGNOSIS — R73.01 IFG (IMPAIRED FASTING GLUCOSE): ICD-10-CM

## 2020-07-14 DIAGNOSIS — J45.909 ASTHMA, UNSPECIFIED ASTHMA SEVERITY, UNSPECIFIED WHETHER COMPLICATED, UNSPECIFIED WHETHER PERSISTENT: ICD-10-CM

## 2020-07-14 PROCEDURE — 99999 PR PBB SHADOW E&M-EST. PATIENT-LVL V: CPT | Mod: PBBFAC,,, | Performed by: FAMILY MEDICINE

## 2020-07-14 PROCEDURE — 99999 PR PBB SHADOW E&M-EST. PATIENT-LVL V: ICD-10-PCS | Mod: PBBFAC,,, | Performed by: FAMILY MEDICINE

## 2020-07-14 PROCEDURE — 99214 PR OFFICE/OUTPT VISIT, EST, LEVL IV, 30-39 MIN: ICD-10-PCS | Mod: S$GLB,,, | Performed by: FAMILY MEDICINE

## 2020-07-14 PROCEDURE — 3075F SYST BP GE 130 - 139MM HG: CPT | Mod: CPTII,S$GLB,, | Performed by: FAMILY MEDICINE

## 2020-07-14 PROCEDURE — 3008F PR BODY MASS INDEX (BMI) DOCUMENTED: ICD-10-PCS | Mod: CPTII,S$GLB,, | Performed by: FAMILY MEDICINE

## 2020-07-14 PROCEDURE — 3075F PR MOST RECENT SYSTOLIC BLOOD PRESS GE 130-139MM HG: ICD-10-PCS | Mod: CPTII,S$GLB,, | Performed by: FAMILY MEDICINE

## 2020-07-14 PROCEDURE — 3079F PR MOST RECENT DIASTOLIC BLOOD PRESSURE 80-89 MM HG: ICD-10-PCS | Mod: CPTII,S$GLB,, | Performed by: FAMILY MEDICINE

## 2020-07-14 PROCEDURE — 99214 OFFICE O/P EST MOD 30 MIN: CPT | Mod: S$GLB,,, | Performed by: FAMILY MEDICINE

## 2020-07-14 PROCEDURE — 3079F DIAST BP 80-89 MM HG: CPT | Mod: CPTII,S$GLB,, | Performed by: FAMILY MEDICINE

## 2020-07-14 PROCEDURE — 3008F BODY MASS INDEX DOCD: CPT | Mod: CPTII,S$GLB,, | Performed by: FAMILY MEDICINE

## 2020-07-14 RX ORDER — LOSARTAN POTASSIUM 100 MG/1
TABLET ORAL
Qty: 90 TABLET | Refills: 3 | Status: SHIPPED | OUTPATIENT
Start: 2020-07-14 | End: 2021-07-19

## 2020-07-14 RX ORDER — FLUTICASONE PROPIONATE AND SALMETEROL 250; 50 UG/1; UG/1
POWDER RESPIRATORY (INHALATION)
Qty: 180 EACH | Refills: 3 | Status: SHIPPED | OUTPATIENT
Start: 2020-07-14 | End: 2020-10-20

## 2020-07-14 NOTE — PROGRESS NOTES
Subjective:       Patient ID: Greg Garcia is a 40 y.o. male.    Chief Complaint: Hypertension    HPI     Here for a f/u.      Seeing Dr. Olivas, medical psychologist, for anxiety/depression management.  On prozac and wellbutrin.      Asthma controlled.      Since taking norvasc 6 months ago, has reported some fatigue. As a result, stopped taking norvasc.         Review of Systems      Review of Systems   Constitutional: Negative for fever and chills.   HENT: Negative for hearing loss and neck stiffness.    Eyes: Negative for redness and itching.   Respiratory: Negative for cough and choking.    Cardiovascular: Negative for chest pain and leg swelling.  Abdomen: Negative for abdominal pain and blood in stool.   Genitourinary: Negative for dysuria and flank pain.   Musculoskeletal: Negative for back pain and gait problem.   Neurological: Negative for light-headedness and headaches.   Hematological: Negative for adenopathy.       Objective:      Physical Exam  HENT:      Head: Atraumatic.   Eyes:      Conjunctiva/sclera: Conjunctivae normal.      Pupils: Pupils are equal, round, and reactive to light.   Neck:      Musculoskeletal: Normal range of motion.   Cardiovascular:      Rate and Rhythm: Normal rate and regular rhythm.      Heart sounds: No murmur.   Pulmonary:      Effort: Pulmonary effort is normal.      Breath sounds: Normal breath sounds. No wheezing.   Lymphadenopathy:      Cervical: No cervical adenopathy.         Assessment:       1. Hypertension, unspecified type    2. Asthma, unspecified asthma severity, unspecified whether complicated, unspecified whether persistent    3. IFG (impaired fasting glucose)    4. Skin cancer screening        Plan:       Hypertension, unspecified type  -     Comprehensive metabolic panel; Future  -     Lipid Panel; Future    Asthma, unspecified asthma severity, unspecified whether complicated, unspecified whether persistent  -     fluticasone-salmeterol diskus  inhaler 250-50 mcg; Controller  Dispense: 180 each; Refill: 3    IFG (impaired fasting glucose)  -     Hemoglobin A1C; Future    Skin cancer screening  -     Ambulatory referral/consult to Dermatology; Future; Expected date: 07/21/2020                Plan:  See orders and referral  Cont current meds        Medication List with Changes/Refills   Current Medications    ALBUTEROL SULFATE (VENTOLIN HFA INHL)    Inhale 2 puffs into the lungs every 6 (six) hours as needed.    AZELASTINE (ASTELIN) 137 MCG (0.1 %) NASAL SPRAY    USE 1 SPRAY(S) IN EACH NOSTRIL TWICE DAILY    BUPROPION (WELLBUTRIN XL) 300 MG 24 HR TABLET        CARVEDILOL (COREG) 25 MG TABLET    Take 1 tablet (25 mg total) by mouth 2 (two) times daily with meals.    CETIRIZINE-PSEUDOEPHEDRINE 5-120 MG TB12    Take 5 mg by mouth 2 (two) times daily.    CHOLECALCIFEROL, VITAMIN D3, (VITAMIN D3) 5,000 UNIT TAB    Take 5,000 Units by mouth once daily.    ESOMEPRAZOLE (NEXIUM) 20 MG CAPSULE    Take 20 mg by mouth once daily.    FLUOXETINE 20 MG CAPSULE    Take 3 capsules (60 mg total) by mouth once daily.    GLUCOSAMINE-CHONDROITIN 500-400 MG TABLET    Take 1 tablet by mouth 2 (two) times daily.    IBUPROFEN (ADVIL,MOTRIN) 200 MG TABLET    Take 200 mg by mouth 2 (two) times daily. 4 tab twice daily    KETOCONAZOLE (NIZORAL) 2 % CREAM    APPLY A THIN FILM TO AFFECTED AREA OF GROIN DAILY AS NEEDED FOR FLARE    MIRABEGRON (MYRBETRIQ) 50 MG TB24    Take 1 tablet (50 mg total) by mouth once daily.    MONTELUKAST (SINGULAIR) 10 MG TABLET    Take 1 tablet (10 mg total) by mouth once daily.    MULTIVITAMIN (ONE DAILY MULTIVITAMIN) PER TABLET    Take 1 tablet by mouth once daily.    ONDANSETRON (ZOFRAN) 4 MG TABLET    Take 1 tablet (4 mg total) by mouth every 6 (six) hours.    OXYBUTYNIN (DITROPAN-XL) 10 MG 24 HR TABLET    Take 1 tablet (10 mg total) by mouth once daily.   Changed and/or Refilled Medications    Modified Medication Previous Medication     FLUTICASONE-SALMETEROL DISKUS INHALER 250-50 MCG fluticasone-salmeterol diskus inhaler 250-50 mcg       Controller    Controller    LOSARTAN (COZAAR) 100 MG TABLET losartan (COZAAR) 100 MG tablet       Take 1 tablet by mouth once daily    TAKE 1 TABLET BY MOUTH ONCE DAILY   Discontinued Medications    AMLODIPINE (NORVASC) 5 MG TABLET    Take 1 tablet (5 mg total) by mouth once daily.

## 2020-08-05 ENCOUNTER — PATIENT OUTREACH (OUTPATIENT)
Dept: ADMINISTRATIVE | Facility: OTHER | Age: 40
End: 2020-08-05

## 2020-08-07 ENCOUNTER — OFFICE VISIT (OUTPATIENT)
Dept: DERMATOLOGY | Facility: CLINIC | Age: 40
End: 2020-08-07
Payer: COMMERCIAL

## 2020-08-07 DIAGNOSIS — L82.1 SEBORRHEIC KERATOSES: ICD-10-CM

## 2020-08-07 DIAGNOSIS — Z12.83 SCREENING EXAM FOR SKIN CANCER: ICD-10-CM

## 2020-08-07 DIAGNOSIS — L85.8 KERATOSIS PILARIS: ICD-10-CM

## 2020-08-07 DIAGNOSIS — D22.9 MULTIPLE BENIGN NEVI: ICD-10-CM

## 2020-08-07 DIAGNOSIS — L90.0 LICHEN SCLEROSUS: ICD-10-CM

## 2020-08-07 DIAGNOSIS — D18.01 ANGIOMA OF SKIN: ICD-10-CM

## 2020-08-07 DIAGNOSIS — L70.2 ACNE NECROTICA: Primary | ICD-10-CM

## 2020-08-07 PROCEDURE — 99214 PR OFFICE/OUTPT VISIT, EST, LEVL IV, 30-39 MIN: ICD-10-PCS | Mod: S$GLB,,, | Performed by: DERMATOLOGY

## 2020-08-07 PROCEDURE — 99999 PR PBB SHADOW E&M-EST. PATIENT-LVL II: CPT | Mod: PBBFAC,,, | Performed by: DERMATOLOGY

## 2020-08-07 PROCEDURE — 99214 OFFICE O/P EST MOD 30 MIN: CPT | Mod: S$GLB,,, | Performed by: DERMATOLOGY

## 2020-08-07 PROCEDURE — 99999 PR PBB SHADOW E&M-EST. PATIENT-LVL II: ICD-10-PCS | Mod: PBBFAC,,, | Performed by: DERMATOLOGY

## 2020-08-07 RX ORDER — TRIAMCINOLONE ACETONIDE 1 MG/G
CREAM TOPICAL 2 TIMES DAILY
Qty: 80 G | Refills: 2 | Status: SHIPPED | OUTPATIENT
Start: 2020-08-07

## 2020-08-07 RX ORDER — CLINDAMYCIN PHOSPHATE 11.9 MG/ML
SOLUTION TOPICAL 2 TIMES DAILY
Qty: 30 ML | Refills: 2 | Status: SHIPPED | OUTPATIENT
Start: 2020-08-07 | End: 2022-03-16

## 2020-08-07 RX ORDER — KETOCONAZOLE 20 MG/ML
SHAMPOO, SUSPENSION TOPICAL
Qty: 120 ML | Refills: 5 | Status: SHIPPED | OUTPATIENT
Start: 2020-08-07 | End: 2024-03-27

## 2020-08-07 NOTE — PROGRESS NOTES
Subjective:       Patient ID:  Greg Garcia is a 40 y.o. male who presents for   Chief Complaint   Patient presents with    Skin Check     Initial visit with provider.     Patient present for TBSE.     Patient c/o lesion to L cheek x 8 months. The patient denies any change, including change in color, increase in size, or spontaneous bleeding, associated with this lesion.      Patient also has lesion to L lower eye x 3-4 months. The patient denies any change, including change in color, increase in size, or spontaneous bleeding, associated with this lesion.      no Phx of NMSC.  no Fhx of melanoma.     Past Medical History:  No date: AR (allergic rhinitis)  No date: Asthma  No date: Cervicalgia  No date: Depression      Comment:  chastity Olivas, psychologist  No date: Morbid obesity      Comment:  BMI 59  No date: JOHN (obstructive sleep apnea)      Comment:  cpap  1995: Primary nocturnal enuresis      Comment:  until age 15, then resolved spontaneously        Review of Systems   Constitutional: Negative for fever and chills.   HENT: Negative for congestion and sore throat.    Respiratory: Negative for cough and shortness of breath.    Gastrointestinal: Negative for nausea and vomiting.   Skin: Positive for rash, dry skin and activity-related sunscreen use. Negative for itching, daily sunscreen use and recent sunburn.        Objective:    Physical Exam   Skin:   Areas Examined (abnormalities noted in diagram):   Scalp / Hair Palpated and Inspected  Head / Face Inspection Performed  Neck Inspection Performed  Chest / Axilla Inspection Performed  Abdomen Inspection Performed  Back Inspection Performed  RUE Inspected  LUE Inspection Performed  RLE Inspected  LLE Inspection Performed  Nails and Digits Inspection Performed                       Diagram Legend     Erythematous scaling macule/papule c/w actinic keratosis       Vascular papule c/w angioma      Pigmented verrucoid papule/plaque c/w seborrheic  keratosis      Yellow umbilicated papule c/w sebaceous hyperplasia      Irregularly shaped tan macule c/w lentigo     1-2 mm smooth white papules consistent with Milia      Movable subcutaneous cyst with punctum c/w epidermal inclusion cyst      Subcutaneous movable cyst c/w pilar cyst      Firm pink to brown papule c/w dermatofibroma      Pedunculated fleshy papule(s) c/w skin tag(s)      Evenly pigmented macule c/w junctional nevus     Mildly variegated pigmented, slightly irregular-bordered macule c/w mildly atypical nevus      Flesh colored to evenly pigmented papule c/w intradermal nevus       Pink pearly papule/plaque c/w basal cell carcinoma      Erythematous hyperkeratotic cursted plaque c/w SCC      Surgical scar with no sign of skin cancer recurrence      Open and closed comedones      Inflammatory papules and pustules      Verrucoid papule consistent consistent with wart     Erythematous eczematous patches and plaques     Dystrophic onycholytic nail with subungual debris c/w onychomycosis     Umbilicated papule    Erythematous-base heme-crusted tan verrucoid plaque consistent with inflamed seborrheic keratosis     Erythematous Silvery Scaling Plaque c/w Psoriasis     See annotation      Assessment / Plan:        Acne necrotica  -     clindamycin (CLEOCIN T) 1 % external solution; Apply topically 2 (two) times daily.  Dispense: 30 mL; Refill: 2  -     ketoconazole (NIZORAL) 2 % shampoo; Wash hair with medicated shampoo at least 2x/week - let sit on scalp at least 5 minutes prior to rinsing  Dispense: 120 mL; Refill: 5    Acne necrotica, mildly active in the scalp  - The patient was counseled on the chronic waxing and waning nature for this condition.  - Initiate topical clindamycin lotion 1% at least twice daily up to six times per day.  - I prescribed Ketoconazole 2% shampoo to be applied to the affected areas once daily, lather, wait for 5 minutes, then rinse. Instructed to do this daily for 1 week. Then  1-2 times weekly thereafter as maintenance therapy.   - May alternate with Head & Shoulders, Selsun Blue, or Neutrogena T-Gel shampoo as desired.      Favor Lichen sclerosus  -     triamcinolone acetonide 0.1% (KENALOG) 0.1 % cream; Apply topically 2 (two) times daily. X 2 wks then off 2 wks. Repeat as needed  Dispense: 80 g; Refill: 2    - I prescribed TMC 0.1% to be applied twice daily to affected areas for two weeks then off. Repeat as needed.   - Side effects of topical steroids were reviewed with the patient including skin atrophy, striae, telangectasias and tachyphylaxis.   - Discussed he should monitor for new lesions. Discussed possible genital involvement - pt denies involvement but he should monitor for rash/symptoms. He just saw urology.       Keratosis pilaris    - I recommended that the patient consider using over-the-counter Cerave SA lotion as needed.      Multiple benign nevi (some of which are mildly atypical)  total body skin examination performed today including at least 12 points as noted in physical examination. No lesions suspicious for malignancy noted.  Reassurance provided.  Instructed patient to observe lesion(s) for changes and follow up in clinic if changes are noted. Discussed ABCDE's of moles and brochure provided.      Seborrheic keratoses  These are benign inherited growths without a malignant potential. Reassurance given to patient. No treatment is necessary.       Angioma of skin  This is a benign vascular lesion. Reassurance given. No treatment required.         Screening exam for skin cancer  Total body skin examination performed today including at least 12 points as noted in physical examination. No lesions suspicious for malignancy noted.               Follow up in about 6 months (around 2/7/2021) for skin check/atypical nevi.

## 2020-08-12 ENCOUNTER — LAB VISIT (OUTPATIENT)
Dept: LAB | Facility: HOSPITAL | Age: 40
End: 2020-08-12
Attending: FAMILY MEDICINE
Payer: COMMERCIAL

## 2020-08-12 DIAGNOSIS — R73.01 IFG (IMPAIRED FASTING GLUCOSE): ICD-10-CM

## 2020-08-12 DIAGNOSIS — I10 HYPERTENSION, UNSPECIFIED TYPE: ICD-10-CM

## 2020-08-12 LAB
ALBUMIN SERPL BCP-MCNC: 3.8 G/DL (ref 3.5–5.2)
ALP SERPL-CCNC: 114 U/L (ref 55–135)
ALT SERPL W/O P-5'-P-CCNC: 19 U/L (ref 10–44)
ANION GAP SERPL CALC-SCNC: 8 MMOL/L (ref 8–16)
AST SERPL-CCNC: 17 U/L (ref 10–40)
BILIRUB SERPL-MCNC: 0.5 MG/DL (ref 0.1–1)
BUN SERPL-MCNC: 10 MG/DL (ref 6–20)
CALCIUM SERPL-MCNC: 9.2 MG/DL (ref 8.7–10.5)
CHLORIDE SERPL-SCNC: 104 MMOL/L (ref 95–110)
CHOLEST SERPL-MCNC: 170 MG/DL (ref 120–199)
CHOLEST/HDLC SERPL: 5.2 {RATIO} (ref 2–5)
CO2 SERPL-SCNC: 28 MMOL/L (ref 23–29)
CREAT SERPL-MCNC: 1 MG/DL (ref 0.5–1.4)
EST. GFR  (AFRICAN AMERICAN): >60 ML/MIN/1.73 M^2
EST. GFR  (NON AFRICAN AMERICAN): >60 ML/MIN/1.73 M^2
GLUCOSE SERPL-MCNC: 108 MG/DL (ref 70–110)
HDLC SERPL-MCNC: 33 MG/DL (ref 40–75)
HDLC SERPL: 19.4 % (ref 20–50)
LDLC SERPL CALC-MCNC: 117.6 MG/DL (ref 63–159)
NONHDLC SERPL-MCNC: 137 MG/DL
POTASSIUM SERPL-SCNC: 4.3 MMOL/L (ref 3.5–5.1)
PROT SERPL-MCNC: 6.9 G/DL (ref 6–8.4)
SODIUM SERPL-SCNC: 140 MMOL/L (ref 136–145)
TRIGL SERPL-MCNC: 97 MG/DL (ref 30–150)

## 2020-08-12 PROCEDURE — 83036 HEMOGLOBIN GLYCOSYLATED A1C: CPT

## 2020-08-12 PROCEDURE — 80061 LIPID PANEL: CPT

## 2020-08-12 PROCEDURE — 36415 COLL VENOUS BLD VENIPUNCTURE: CPT | Mod: PO

## 2020-08-12 PROCEDURE — 80053 COMPREHEN METABOLIC PANEL: CPT

## 2020-08-13 LAB
ESTIMATED AVG GLUCOSE: 103 MG/DL (ref 68–131)
HBA1C MFR BLD HPLC: 5.2 % (ref 4–5.6)

## 2020-10-14 ENCOUNTER — TELEPHONE (OUTPATIENT)
Dept: FAMILY MEDICINE | Facility: CLINIC | Age: 40
End: 2020-10-14

## 2020-10-20 DIAGNOSIS — J45.909 ASTHMA, UNSPECIFIED ASTHMA SEVERITY, UNSPECIFIED WHETHER COMPLICATED, UNSPECIFIED WHETHER PERSISTENT: ICD-10-CM

## 2020-10-20 RX ORDER — FLUTICASONE PROPIONATE AND SALMETEROL 250; 50 UG/1; UG/1
POWDER RESPIRATORY (INHALATION)
Qty: 180 EACH | Refills: 3 | Status: SHIPPED | OUTPATIENT
Start: 2020-10-20 | End: 2021-09-13 | Stop reason: SDUPTHER

## 2020-10-20 NOTE — TELEPHONE ENCOUNTER
No new care gaps identified.  Powered by [employer]. Reference number: [id number]. 10/20/2020 3:00:37 AM   CDT

## 2020-10-20 NOTE — PROGRESS NOTES
Refill Authorization Note   Greg Garcia is requesting a refill authorization.  Brief assessment and rationale for refill: Approve     Medication Therapy Plan: [facility]    Medication reconciliation completed: No   Comments:   Orders Placed This Encounter    fluticasone-salmeterol diskus inhaler 250-50 mcg      Requested Prescriptions   Signed Prescriptions Disp Refills    fluticasone-salmeterol diskus inhaler 250-50 mcg 180 each 3     Sig: INHALE 1 DOSE BY MOUTH ONCE DAILY AS DIRECTED *CONTROLLER*       Pulmonology:  Combination Products Passed - 10/20/2020  3:00 AM        Passed - Patient is at least 18 years old        Passed - Patient has applicable pulmonary diagnosis on their problem list        Passed - Last Heart Rate in normal range within 360 days     Pulse Readings from Last 3 Encounters:   07/14/20 85   01/20/20 72   01/15/20 61             Passed - Office Visit within last 12 months or future 90 days.     Recent Outpatient Visits            2 months ago Acne necrotica    [facility] Carlita Arboleda MD    3 months ago Hypertension, unspecified type    [facility] Medicine Ignacio Terrell MD    8 months ago Screening for prostate cancer    [facility] Urology Baron Tabares MD    9 months ago Prostatitis, unspecified prostatitis type    [facility] Medicine Marlee Crandall NP    10 months ago Polyuria    [facility] Medicine Ignacio Terrell MD                        Appointments  past 12m or future 3m with PCP    Date Provider   Last Visit   7/14/2020 Ignacio Terrell MD   Next Visit   Visit date not found Ignacio Terrell MD   ED visits in past 90 days: 0     Note composed:1:55 PM 10/20/2020

## 2020-10-27 ENCOUNTER — OFFICE VISIT (OUTPATIENT)
Dept: FAMILY MEDICINE | Facility: CLINIC | Age: 40
End: 2020-10-27
Payer: COMMERCIAL

## 2020-10-27 VITALS
SYSTOLIC BLOOD PRESSURE: 156 MMHG | WEIGHT: 315 LBS | HEIGHT: 72 IN | OXYGEN SATURATION: 97 % | BODY MASS INDEX: 42.66 KG/M2 | HEART RATE: 92 BPM | DIASTOLIC BLOOD PRESSURE: 98 MMHG

## 2020-10-27 DIAGNOSIS — E66.01 MORBID OBESITY: ICD-10-CM

## 2020-10-27 DIAGNOSIS — I10 HYPERTENSION, UNSPECIFIED TYPE: Primary | ICD-10-CM

## 2020-10-27 DIAGNOSIS — J45.909 ASTHMA, UNSPECIFIED ASTHMA SEVERITY, UNSPECIFIED WHETHER COMPLICATED, UNSPECIFIED WHETHER PERSISTENT: ICD-10-CM

## 2020-10-27 DIAGNOSIS — F41.9 ANXIETY: ICD-10-CM

## 2020-10-27 PROCEDURE — 99999 PR PBB SHADOW E&M-EST. PATIENT-LVL V: CPT | Mod: PBBFAC,,, | Performed by: FAMILY MEDICINE

## 2020-10-27 PROCEDURE — 3008F PR BODY MASS INDEX (BMI) DOCUMENTED: ICD-10-PCS | Mod: CPTII,S$GLB,, | Performed by: FAMILY MEDICINE

## 2020-10-27 PROCEDURE — 3080F DIAST BP >= 90 MM HG: CPT | Mod: CPTII,S$GLB,, | Performed by: FAMILY MEDICINE

## 2020-10-27 PROCEDURE — 3080F PR MOST RECENT DIASTOLIC BLOOD PRESSURE >= 90 MM HG: ICD-10-PCS | Mod: CPTII,S$GLB,, | Performed by: FAMILY MEDICINE

## 2020-10-27 PROCEDURE — 3077F PR MOST RECENT SYSTOLIC BLOOD PRESSURE >= 140 MM HG: ICD-10-PCS | Mod: CPTII,S$GLB,, | Performed by: FAMILY MEDICINE

## 2020-10-27 PROCEDURE — 3008F BODY MASS INDEX DOCD: CPT | Mod: CPTII,S$GLB,, | Performed by: FAMILY MEDICINE

## 2020-10-27 PROCEDURE — 99214 PR OFFICE/OUTPT VISIT, EST, LEVL IV, 30-39 MIN: ICD-10-PCS | Mod: S$GLB,,, | Performed by: FAMILY MEDICINE

## 2020-10-27 PROCEDURE — 3077F SYST BP >= 140 MM HG: CPT | Mod: CPTII,S$GLB,, | Performed by: FAMILY MEDICINE

## 2020-10-27 PROCEDURE — 99214 OFFICE O/P EST MOD 30 MIN: CPT | Mod: S$GLB,,, | Performed by: FAMILY MEDICINE

## 2020-10-27 PROCEDURE — 99999 PR PBB SHADOW E&M-EST. PATIENT-LVL V: ICD-10-PCS | Mod: PBBFAC,,, | Performed by: FAMILY MEDICINE

## 2020-10-27 RX ORDER — BUPROPION HYDROCHLORIDE 150 MG/1
150 TABLET ORAL EVERY MORNING
COMMUNITY
Start: 2020-10-13 | End: 2021-07-12

## 2020-10-27 RX ORDER — AMLODIPINE BESYLATE 5 MG/1
5 TABLET ORAL DAILY
Qty: 90 TABLET | Refills: 3 | Status: SHIPPED | OUTPATIENT
Start: 2020-10-27 | End: 2021-09-13 | Stop reason: SDUPTHER

## 2020-10-27 NOTE — PROGRESS NOTES
Subjective:       Patient ID: Greg Garcia is a 40 y.o. male.    Chief Complaint: Hypertension    HPI     Here for a f/u.     bp elevated at home for the past 2 months.  140-150/90's.      Seeing Dr. Olivas, medical psychologist, for anxiety/depression management.  On prozac and wellbutrin.      Asthma controlled.          Review of Systems      Review of Systems   Constitutional: Negative for fever and chills.   HENT: Negative for hearing loss and neck stiffness.    Eyes: Negative for redness and itching.   Respiratory: Negative for cough and choking.    Cardiovascular: Negative for chest pain and leg swelling.  Abdomen: Negative for abdominal pain and blood in stool.   Genitourinary: Negative for dysuria and flank pain.   Musculoskeletal: Negative for back pain and gait problem.   Neurological: Negative for light-headedness and headaches.   Hematological: Negative for adenopathy.   Psychiatric/Behavioral: Negative for behavioral problems.     Objective:      Physical Exam  HENT:      Head: Atraumatic.   Eyes:      Conjunctiva/sclera: Conjunctivae normal.      Pupils: Pupils are equal, round, and reactive to light.   Neck:      Musculoskeletal: Normal range of motion.   Cardiovascular:      Rate and Rhythm: Normal rate and regular rhythm.      Heart sounds: No murmur.   Pulmonary:      Effort: Pulmonary effort is normal.      Breath sounds: Normal breath sounds. No wheezing.   Lymphadenopathy:      Cervical: No cervical adenopathy.         Assessment:       1. Hypertension, unspecified type    2. Asthma, unspecified asthma severity, unspecified whether complicated, unspecified whether persistent    3. Anxiety    4. Morbid obesity        Plan:       Hypertension, unspecified type    Asthma, unspecified asthma severity, unspecified whether complicated, unspecified whether persistent    Anxiety    Morbid obesity    Other orders  -     amLODIPine (NORVASC) 5 MG tablet; Take 1 tablet (5 mg total) by mouth  once daily.  Dispense: 90 tablet; Refill: 3                Plan:  Start norvasc. Serial bp checks  Cont all other meds      Medication List with Changes/Refills   New Medications    AMLODIPINE (NORVASC) 5 MG TABLET    Take 1 tablet (5 mg total) by mouth once daily.   Current Medications    ALBUTEROL SULFATE (VENTOLIN HFA INHL)    Inhale 2 puffs into the lungs every 6 (six) hours as needed.    AZELASTINE (ASTELIN) 137 MCG (0.1 %) NASAL SPRAY    USE 1 SPRAY(S) IN EACH NOSTRIL TWICE DAILY    BUPROPION (WELLBUTRIN XL) 150 MG TB24 TABLET    Take 150 mg by mouth every morning.    BUPROPION (WELLBUTRIN XL) 300 MG 24 HR TABLET        CARVEDILOL (COREG) 25 MG TABLET    Take 1 tablet (25 mg total) by mouth 2 (two) times daily with meals.    CETIRIZINE-PSEUDOEPHEDRINE 5-120 MG TB12    Take 5 mg by mouth 2 (two) times daily.    CHOLECALCIFEROL, VITAMIN D3, (VITAMIN D3) 5,000 UNIT TAB    Take 5,000 Units by mouth once daily.    CLINDAMYCIN (CLEOCIN T) 1 % EXTERNAL SOLUTION    Apply topically 2 (two) times daily.    ESOMEPRAZOLE (NEXIUM) 20 MG CAPSULE    Take 20 mg by mouth once daily.    FLUOXETINE 20 MG CAPSULE    Take 3 capsules (60 mg total) by mouth once daily.    FLUTICASONE-SALMETEROL DISKUS INHALER 250-50 MCG    INHALE 1 DOSE BY MOUTH ONCE DAILY AS DIRECTED *CONTROLLER*    GLUCOSAMINE-CHONDROITIN 500-400 MG TABLET    Take 1 tablet by mouth 2 (two) times daily.    IBUPROFEN (ADVIL,MOTRIN) 200 MG TABLET    Take 200 mg by mouth 2 (two) times daily. 4 tab twice daily    KETOCONAZOLE (NIZORAL) 2 % CREAM    APPLY A THIN FILM TO AFFECTED AREA OF GROIN DAILY AS NEEDED FOR FLARE    KETOCONAZOLE (NIZORAL) 2 % SHAMPOO    Wash hair with medicated shampoo at least 2x/week - let sit on scalp at least 5 minutes prior to rinsing    LOSARTAN (COZAAR) 100 MG TABLET    Take 1 tablet by mouth once daily    MIRABEGRON (MYRBETRIQ) 50 MG TB24    Take 1 tablet (50 mg total) by mouth once daily.    MONTELUKAST (SINGULAIR) 10 MG TABLET    Take 1  tablet (10 mg total) by mouth once daily.    MULTIVITAMIN (ONE DAILY MULTIVITAMIN) PER TABLET    Take 1 tablet by mouth once daily.    ONDANSETRON (ZOFRAN) 4 MG TABLET    Take 1 tablet (4 mg total) by mouth every 6 (six) hours.    OXYBUTYNIN (DITROPAN-XL) 10 MG 24 HR TABLET    Take 1 tablet (10 mg total) by mouth once daily.    TRIAMCINOLONE ACETONIDE 0.1% (KENALOG) 0.1 % CREAM    Apply topically 2 (two) times daily. X 2 wks then off 2 wks. Repeat as needed

## 2020-12-10 ENCOUNTER — CLINICAL SUPPORT (OUTPATIENT)
Dept: FAMILY MEDICINE | Facility: CLINIC | Age: 40
End: 2020-12-10
Payer: COMMERCIAL

## 2020-12-10 VITALS — OXYGEN SATURATION: 98 % | DIASTOLIC BLOOD PRESSURE: 82 MMHG | SYSTOLIC BLOOD PRESSURE: 136 MMHG | HEART RATE: 68 BPM

## 2020-12-10 DIAGNOSIS — Z01.30 BP CHECK: Primary | ICD-10-CM

## 2020-12-10 PROCEDURE — 99999 PR PBB SHADOW E&M-EST. PATIENT-LVL IV: CPT | Mod: PBBFAC,,,

## 2020-12-10 PROCEDURE — 99999 PR PBB SHADOW E&M-EST. PATIENT-LVL IV: ICD-10-PCS | Mod: PBBFAC,,,

## 2020-12-10 PROCEDURE — 99499 UNLISTED E&M SERVICE: CPT | Mod: S$GLB,,, | Performed by: FAMILY MEDICINE

## 2020-12-10 PROCEDURE — 99499 NO LOS: ICD-10-PCS | Mod: S$GLB,,, | Performed by: FAMILY MEDICINE

## 2020-12-10 NOTE — PATIENT INSTRUCTIONS
Pt instructed to continue to take meds as ordered, keep all f/u appts with provider. Pt verbalized understanding.

## 2020-12-10 NOTE — PROGRESS NOTES
BP: 136/82 , Pulse: 68    Greg Garcia 40 y.o. male is here today for Blood Pressure check.   History of HTN yes.    Review of patient's allergies indicates:   Allergen Reactions    Chlorthalidone Other (See Comments)     Clammy and diaphoretic    Hydrochlorothiazide Other (See Comments)     fatigue    Lisinopril Other (See Comments)     High dose 40 mg causing cough    Pollen extracts      Creatinine   Date Value Ref Range Status   08/12/2020 1.0 0.5 - 1.4 mg/dL Final     Sodium   Date Value Ref Range Status   08/12/2020 140 136 - 145 mmol/L Final     Potassium   Date Value Ref Range Status   08/12/2020 4.3 3.5 - 5.1 mmol/L Final   ]  Patient verifies taking blood pressure medications on a regular basis at the same time of the day.     Current Outpatient Medications:     albuterol sulfate (VENTOLIN HFA INHL), Inhale 2 puffs into the lungs every 6 (six) hours as needed., Disp: , Rfl:     amLODIPine (NORVASC) 5 MG tablet, Take 1 tablet (5 mg total) by mouth once daily., Disp: 90 tablet, Rfl: 3    azelastine (ASTELIN) 137 mcg (0.1 %) nasal spray, USE 1 SPRAY(S) IN EACH NOSTRIL TWICE DAILY, Disp: 90 mL, Rfl: 3    buPROPion (WELLBUTRIN XL) 150 MG TB24 tablet, Take 150 mg by mouth every morning., Disp: , Rfl:     carvediloL (COREG) 25 MG tablet, Take 1 tablet (25 mg total) by mouth 2 (two) times daily with meals., Disp: 180 tablet, Rfl: 3    cetirizine-pseudoephedrine 5-120 mg Tb12, Take 5 mg by mouth 2 (two) times daily. (Patient taking differently: Take 5 mg by mouth once daily. ), Disp: 48 tablet, Rfl: 2    cholecalciferol, vitamin D3, (VITAMIN D3) 5,000 unit Tab, Take 5,000 Units by mouth once daily., Disp: , Rfl:     clindamycin (CLEOCIN T) 1 % external solution, Apply topically 2 (two) times daily., Disp: 30 mL, Rfl: 2    esomeprazole (NEXIUM) 20 MG capsule, Take 20 mg by mouth once daily., Disp: , Rfl:     fluticasone-salmeterol diskus inhaler 250-50 mcg, INHALE 1 DOSE BY MOUTH ONCE DAILY  AS DIRECTED *CONTROLLER*, Disp: 180 each, Rfl: 3    glucosamine-chondroitin 500-400 mg tablet, Take 1 tablet by mouth 2 (two) times daily., Disp: , Rfl:     ibuprofen (ADVIL,MOTRIN) 200 MG tablet, Take 200 mg by mouth 2 (two) times daily. 4 tab twice daily, Disp: , Rfl:     ketoconazole (NIZORAL) 2 % cream, APPLY A THIN FILM TO AFFECTED AREA OF GROIN DAILY AS NEEDED FOR FLARE, Disp: 60 g, Rfl: 5    ketoconazole (NIZORAL) 2 % shampoo, Wash hair with medicated shampoo at least 2x/week - let sit on scalp at least 5 minutes prior to rinsing, Disp: 120 mL, Rfl: 5    losartan (COZAAR) 100 MG tablet, Take 1 tablet by mouth once daily, Disp: 90 tablet, Rfl: 3    montelukast (SINGULAIR) 10 mg tablet, Take 1 tablet by mouth once daily, Disp: 90 tablet, Rfl: 1    multivitamin (ONE DAILY MULTIVITAMIN) per tablet, Take 1 tablet by mouth once daily., Disp: , Rfl:     oxybutynin (DITROPAN-XL) 10 MG 24 hr tablet, Take 1 tablet (10 mg total) by mouth once daily., Disp: 30 tablet, Rfl: 11    triamcinolone acetonide 0.1% (KENALOG) 0.1 % cream, Apply topically 2 (two) times daily. X 2 wks then off 2 wks. Repeat as needed, Disp: 80 g, Rfl: 2    buPROPion (WELLBUTRIN XL) 300 MG 24 hr tablet, , Disp: , Rfl:     FLUoxetine 20 MG capsule, Take 3 capsules (60 mg total) by mouth once daily., Disp: 270 capsule, Rfl: 3    mirabegron (MYRBETRIQ) 50 mg Tb24, Take 1 tablet (50 mg total) by mouth once daily. (Patient not taking: Reported on 7/14/2020), Disp: 30 tablet, Rfl: 11    ondansetron (ZOFRAN) 4 MG tablet, Take 1 tablet (4 mg total) by mouth every 6 (six) hours. (Patient not taking: Reported on 2/4/2020), Disp: 12 tablet, Rfl: 0  Does patient have record of home blood pressure readings no. Readings have been averaging n/a.   [facility] and Coreg and Losartan before 10.  Patient is asymptomatic.   No complaints voiced at this time.

## 2021-02-15 ENCOUNTER — PATIENT OUTREACH (OUTPATIENT)
Dept: ADMINISTRATIVE | Facility: OTHER | Age: 41
End: 2021-02-15

## 2021-02-18 ENCOUNTER — OFFICE VISIT (OUTPATIENT)
Dept: UROLOGY | Facility: CLINIC | Age: 41
End: 2021-02-18
Payer: COMMERCIAL

## 2021-02-18 VITALS
HEART RATE: 63 BPM | BODY MASS INDEX: 42.66 KG/M2 | HEIGHT: 72 IN | SYSTOLIC BLOOD PRESSURE: 154 MMHG | DIASTOLIC BLOOD PRESSURE: 87 MMHG | WEIGHT: 315 LBS

## 2021-02-18 DIAGNOSIS — R35.0 URINARY FREQUENCY: ICD-10-CM

## 2021-02-18 DIAGNOSIS — N39.41 URGE INCONTINENCE: ICD-10-CM

## 2021-02-18 DIAGNOSIS — R35.1 NOCTURIA: ICD-10-CM

## 2021-02-18 PROCEDURE — 1126F PR PAIN SEVERITY QUANTIFIED, NO PAIN PRESENT: ICD-10-PCS | Mod: S$GLB,,, | Performed by: UROLOGY

## 2021-02-18 PROCEDURE — 3079F DIAST BP 80-89 MM HG: CPT | Mod: CPTII,S$GLB,, | Performed by: UROLOGY

## 2021-02-18 PROCEDURE — 99214 PR OFFICE/OUTPT VISIT, EST, LEVL IV, 30-39 MIN: ICD-10-PCS | Mod: S$GLB,,, | Performed by: UROLOGY

## 2021-02-18 PROCEDURE — 3077F SYST BP >= 140 MM HG: CPT | Mod: CPTII,S$GLB,, | Performed by: UROLOGY

## 2021-02-18 PROCEDURE — 3079F PR MOST RECENT DIASTOLIC BLOOD PRESSURE 80-89 MM HG: ICD-10-PCS | Mod: CPTII,S$GLB,, | Performed by: UROLOGY

## 2021-02-18 PROCEDURE — 3008F BODY MASS INDEX DOCD: CPT | Mod: CPTII,S$GLB,, | Performed by: UROLOGY

## 2021-02-18 PROCEDURE — 99999 PR PBB SHADOW E&M-EST. PATIENT-LVL IV: ICD-10-PCS | Mod: PBBFAC,,, | Performed by: UROLOGY

## 2021-02-18 PROCEDURE — 3008F PR BODY MASS INDEX (BMI) DOCUMENTED: ICD-10-PCS | Mod: CPTII,S$GLB,, | Performed by: UROLOGY

## 2021-02-18 PROCEDURE — 1126F AMNT PAIN NOTED NONE PRSNT: CPT | Mod: S$GLB,,, | Performed by: UROLOGY

## 2021-02-18 PROCEDURE — 3077F PR MOST RECENT SYSTOLIC BLOOD PRESSURE >= 140 MM HG: ICD-10-PCS | Mod: CPTII,S$GLB,, | Performed by: UROLOGY

## 2021-02-18 PROCEDURE — 99999 PR PBB SHADOW E&M-EST. PATIENT-LVL IV: CPT | Mod: PBBFAC,,, | Performed by: UROLOGY

## 2021-02-18 PROCEDURE — 99214 OFFICE O/P EST MOD 30 MIN: CPT | Mod: S$GLB,,, | Performed by: UROLOGY

## 2021-02-18 RX ORDER — OXYBUTYNIN CHLORIDE 10 MG/1
10 TABLET, EXTENDED RELEASE ORAL DAILY
Qty: 30 TABLET | Refills: 11 | Status: SHIPPED | OUTPATIENT
Start: 2021-02-18 | End: 2021-03-19

## 2021-02-18 RX ORDER — BREXPIPRAZOLE 1 MG/1
1 TABLET ORAL DAILY
COMMUNITY
End: 2022-10-20

## 2021-03-16 ENCOUNTER — PATIENT MESSAGE (OUTPATIENT)
Dept: UROLOGY | Facility: CLINIC | Age: 41
End: 2021-03-16

## 2021-03-18 ENCOUNTER — PATIENT MESSAGE (OUTPATIENT)
Dept: UROLOGY | Facility: CLINIC | Age: 41
End: 2021-03-18

## 2021-03-18 DIAGNOSIS — R35.1 NOCTURIA: ICD-10-CM

## 2021-03-18 DIAGNOSIS — R35.0 URINARY FREQUENCY: Primary | ICD-10-CM

## 2021-03-18 DIAGNOSIS — N39.41 URGE INCONTINENCE: ICD-10-CM

## 2021-03-21 RX ORDER — SOLIFENACIN SUCCINATE 10 MG/1
10 TABLET, FILM COATED ORAL DAILY
Qty: 30 TABLET | Refills: 11 | Status: SHIPPED | OUTPATIENT
Start: 2021-03-21 | End: 2021-09-07

## 2021-04-09 ENCOUNTER — PATIENT OUTREACH (OUTPATIENT)
Dept: ADMINISTRATIVE | Facility: OTHER | Age: 41
End: 2021-04-09

## 2021-05-17 NOTE — PROGRESS NOTES
Chart review completed 2020.  Care Everywhere updates requested and reviewed.  Immunizations reconciled. Media reports reviewed.  Duplicate HM overrides and  orders removed.  Overdue HM topic chart audit and/or requested.  Overdue lab testing linked to upcoming lab appointments if applies.        Health Maintenance Due   Topic Date Due    HIV Screening  1995    Pneumococcal Vaccine (Medium Risk) (1 of 1 - PPSV23) 1999          [facility] system Dermatology [facility]  Dermatology  [address] [place] [id number]  Phone: [phone]  Fax: [phone]    [facility]  Dermatology  [address], [address]  [place] [address]  Phone: [phone]  Fax: [phone]    [facility]  Dermatology  [address], [address]  [place] [address]  Phone: [phone]  Fax:

## 2021-06-14 DIAGNOSIS — I10 ESSENTIAL HYPERTENSION: Primary | ICD-10-CM

## 2021-06-16 RX ORDER — CARVEDILOL 25 MG/1
TABLET ORAL
Qty: 180 TABLET | Refills: 2 | Status: SHIPPED | OUTPATIENT
Start: 2021-06-16 | End: 2021-09-08 | Stop reason: SDUPTHER

## 2021-06-21 ENCOUNTER — PATIENT MESSAGE (OUTPATIENT)
Dept: FAMILY MEDICINE | Facility: CLINIC | Age: 41
End: 2021-06-21

## 2021-07-12 ENCOUNTER — OFFICE VISIT (OUTPATIENT)
Dept: FAMILY MEDICINE | Facility: CLINIC | Age: 41
End: 2021-07-12
Payer: COMMERCIAL

## 2021-07-12 ENCOUNTER — LAB VISIT (OUTPATIENT)
Dept: LAB | Facility: HOSPITAL | Age: 41
End: 2021-07-12
Attending: NURSE PRACTITIONER
Payer: COMMERCIAL

## 2021-07-12 VITALS
HEIGHT: 72 IN | HEART RATE: 86 BPM | SYSTOLIC BLOOD PRESSURE: 148 MMHG | OXYGEN SATURATION: 97 % | BODY MASS INDEX: 42.66 KG/M2 | DIASTOLIC BLOOD PRESSURE: 100 MMHG | WEIGHT: 315 LBS

## 2021-07-12 DIAGNOSIS — T14.8XXA WOUND DRAINAGE: ICD-10-CM

## 2021-07-12 DIAGNOSIS — E66.01 MORBID OBESITY: ICD-10-CM

## 2021-07-12 DIAGNOSIS — R60.9 EDEMA, UNSPECIFIED TYPE: ICD-10-CM

## 2021-07-12 DIAGNOSIS — E66.01 MORBID OBESITY: Primary | ICD-10-CM

## 2021-07-12 DIAGNOSIS — I10 UNCONTROLLED HYPERTENSION: ICD-10-CM

## 2021-07-12 LAB
BASOPHILS # BLD AUTO: 0.1 K/UL (ref 0–0.2)
BASOPHILS NFR BLD: 1.2 % (ref 0–1.9)
DIFFERENTIAL METHOD: NORMAL
EOSINOPHIL # BLD AUTO: 0.3 K/UL (ref 0–0.5)
EOSINOPHIL NFR BLD: 3.5 % (ref 0–8)
ERYTHROCYTE [DISTWIDTH] IN BLOOD BY AUTOMATED COUNT: 13.3 % (ref 11.5–14.5)
ESTIMATED AVG GLUCOSE: 105 MG/DL (ref 68–131)
HBA1C MFR BLD: 5.3 % (ref 4–5.6)
HCT VFR BLD AUTO: 44.9 % (ref 40–54)
HGB BLD-MCNC: 14.5 G/DL (ref 14–18)
IMM GRANULOCYTES # BLD AUTO: 0.04 K/UL (ref 0–0.04)
IMM GRANULOCYTES NFR BLD AUTO: 0.5 % (ref 0–0.5)
LYMPHOCYTES # BLD AUTO: 2.5 K/UL (ref 1–4.8)
LYMPHOCYTES NFR BLD: 30.2 % (ref 18–48)
MCH RBC QN AUTO: 29.6 PG (ref 27–31)
MCHC RBC AUTO-ENTMCNC: 32.3 G/DL (ref 32–36)
MCV RBC AUTO: 92 FL (ref 82–98)
MONOCYTES # BLD AUTO: 0.7 K/UL (ref 0.3–1)
MONOCYTES NFR BLD: 8.8 % (ref 4–15)
NEUTROPHILS # BLD AUTO: 4.7 K/UL (ref 1.8–7.7)
NEUTROPHILS NFR BLD: 55.8 % (ref 38–73)
NRBC BLD-RTO: 0 /100 WBC
PLATELET # BLD AUTO: 290 K/UL (ref 150–450)
PMV BLD AUTO: 12.7 FL (ref 9.2–12.9)
RBC # BLD AUTO: 4.9 M/UL (ref 4.6–6.2)
WBC # BLD AUTO: 8.32 K/UL (ref 3.9–12.7)

## 2021-07-12 PROCEDURE — 99999 PR PBB SHADOW E&M-EST. PATIENT-LVL V: CPT | Mod: PBBFAC,,, | Performed by: NURSE PRACTITIONER

## 2021-07-12 PROCEDURE — 99999 PR PBB SHADOW E&M-EST. PATIENT-LVL V: ICD-10-PCS | Mod: PBBFAC,,, | Performed by: NURSE PRACTITIONER

## 2021-07-12 PROCEDURE — 83880 ASSAY OF NATRIURETIC PEPTIDE: CPT | Performed by: NURSE PRACTITIONER

## 2021-07-12 PROCEDURE — 36415 COLL VENOUS BLD VENIPUNCTURE: CPT | Mod: PO | Performed by: NURSE PRACTITIONER

## 2021-07-12 PROCEDURE — 1126F PR PAIN SEVERITY QUANTIFIED, NO PAIN PRESENT: ICD-10-PCS | Mod: S$GLB,,, | Performed by: NURSE PRACTITIONER

## 2021-07-12 PROCEDURE — 3077F PR MOST RECENT SYSTOLIC BLOOD PRESSURE >= 140 MM HG: ICD-10-PCS | Mod: CPTII,S$GLB,, | Performed by: NURSE PRACTITIONER

## 2021-07-12 PROCEDURE — 3080F DIAST BP >= 90 MM HG: CPT | Mod: CPTII,S$GLB,, | Performed by: NURSE PRACTITIONER

## 2021-07-12 PROCEDURE — 99214 OFFICE O/P EST MOD 30 MIN: CPT | Mod: S$GLB,,, | Performed by: NURSE PRACTITIONER

## 2021-07-12 PROCEDURE — 1126F AMNT PAIN NOTED NONE PRSNT: CPT | Mod: S$GLB,,, | Performed by: NURSE PRACTITIONER

## 2021-07-12 PROCEDURE — 80061 LIPID PANEL: CPT | Performed by: NURSE PRACTITIONER

## 2021-07-12 PROCEDURE — 3077F SYST BP >= 140 MM HG: CPT | Mod: CPTII,S$GLB,, | Performed by: NURSE PRACTITIONER

## 2021-07-12 PROCEDURE — 83036 HEMOGLOBIN GLYCOSYLATED A1C: CPT | Performed by: NURSE PRACTITIONER

## 2021-07-12 PROCEDURE — 3080F PR MOST RECENT DIASTOLIC BLOOD PRESSURE >= 90 MM HG: ICD-10-PCS | Mod: CPTII,S$GLB,, | Performed by: NURSE PRACTITIONER

## 2021-07-12 PROCEDURE — 3008F BODY MASS INDEX DOCD: CPT | Mod: CPTII,S$GLB,, | Performed by: NURSE PRACTITIONER

## 2021-07-12 PROCEDURE — 3008F PR BODY MASS INDEX (BMI) DOCUMENTED: ICD-10-PCS | Mod: CPTII,S$GLB,, | Performed by: NURSE PRACTITIONER

## 2021-07-12 PROCEDURE — 80053 COMPREHEN METABOLIC PANEL: CPT | Performed by: NURSE PRACTITIONER

## 2021-07-12 PROCEDURE — 99214 PR OFFICE/OUTPT VISIT, EST, LEVL IV, 30-39 MIN: ICD-10-PCS | Mod: S$GLB,,, | Performed by: NURSE PRACTITIONER

## 2021-07-12 PROCEDURE — 84443 ASSAY THYROID STIM HORMONE: CPT | Performed by: NURSE PRACTITIONER

## 2021-07-12 PROCEDURE — 85025 COMPLETE CBC W/AUTO DIFF WBC: CPT | Performed by: NURSE PRACTITIONER

## 2021-07-12 RX ORDER — MUPIROCIN 20 MG/G
OINTMENT TOPICAL 3 TIMES DAILY
Qty: 30 G | Refills: 1 | Status: SHIPPED | OUTPATIENT
Start: 2021-07-12 | End: 2022-03-16

## 2021-07-13 ENCOUNTER — TELEPHONE (OUTPATIENT)
Dept: FAMILY MEDICINE | Facility: CLINIC | Age: 41
End: 2021-07-13

## 2021-07-13 LAB
ALBUMIN SERPL BCP-MCNC: 3.7 G/DL (ref 3.5–5.2)
ALP SERPL-CCNC: 120 U/L (ref 55–135)
ALT SERPL W/O P-5'-P-CCNC: 26 U/L (ref 10–44)
ANION GAP SERPL CALC-SCNC: 10 MMOL/L (ref 8–16)
AST SERPL-CCNC: 20 U/L (ref 10–40)
BILIRUB SERPL-MCNC: 0.4 MG/DL (ref 0.1–1)
BNP SERPL-MCNC: <10 PG/ML (ref 0–99)
BUN SERPL-MCNC: 10 MG/DL (ref 6–20)
CALCIUM SERPL-MCNC: 9.6 MG/DL (ref 8.7–10.5)
CHLORIDE SERPL-SCNC: 104 MMOL/L (ref 95–110)
CHOLEST SERPL-MCNC: 190 MG/DL (ref 120–199)
CHOLEST/HDLC SERPL: 5.6 {RATIO} (ref 2–5)
CO2 SERPL-SCNC: 26 MMOL/L (ref 23–29)
CREAT SERPL-MCNC: 1 MG/DL (ref 0.5–1.4)
EST. GFR  (AFRICAN AMERICAN): >60 ML/MIN/1.73 M^2
EST. GFR  (NON AFRICAN AMERICAN): >60 ML/MIN/1.73 M^2
GLUCOSE SERPL-MCNC: 127 MG/DL (ref 70–110)
HDLC SERPL-MCNC: 34 MG/DL (ref 40–75)
HDLC SERPL: 17.9 % (ref 20–50)
LDLC SERPL CALC-MCNC: 116 MG/DL (ref 63–159)
NONHDLC SERPL-MCNC: 156 MG/DL
POTASSIUM SERPL-SCNC: 3.9 MMOL/L (ref 3.5–5.1)
PROT SERPL-MCNC: 7.2 G/DL (ref 6–8.4)
SODIUM SERPL-SCNC: 140 MMOL/L (ref 136–145)
TRIGL SERPL-MCNC: 200 MG/DL (ref 30–150)
TSH SERPL DL<=0.005 MIU/L-ACNC: 3.96 UIU/ML (ref 0.4–4)

## 2021-07-13 RX ORDER — INDAPAMIDE 1.25 MG/1
1.25 TABLET ORAL EVERY MORNING
Qty: 30 TABLET | Refills: 1 | Status: SHIPPED | OUTPATIENT
Start: 2021-07-13 | End: 2021-09-07

## 2021-07-20 RX ORDER — LOSARTAN POTASSIUM 100 MG/1
100 TABLET ORAL DAILY
Qty: 90 TABLET | Refills: 3 | Status: CANCELLED | OUTPATIENT
Start: 2021-07-20

## 2021-07-20 RX ORDER — LOSARTAN POTASSIUM 100 MG/1
TABLET ORAL
Qty: 90 TABLET | Refills: 1 | Status: SHIPPED | OUTPATIENT
Start: 2021-07-20 | End: 2021-09-13 | Stop reason: SDUPTHER

## 2021-07-25 ENCOUNTER — TELEPHONE (OUTPATIENT)
Dept: FAMILY MEDICINE | Facility: CLINIC | Age: 41
End: 2021-07-25

## 2021-08-03 ENCOUNTER — OFFICE VISIT (OUTPATIENT)
Dept: FAMILY MEDICINE | Facility: CLINIC | Age: 41
End: 2021-08-03
Payer: COMMERCIAL

## 2021-08-03 ENCOUNTER — LAB VISIT (OUTPATIENT)
Dept: LAB | Facility: HOSPITAL | Age: 41
End: 2021-08-03
Attending: NURSE PRACTITIONER
Payer: COMMERCIAL

## 2021-08-03 VITALS
WEIGHT: 315 LBS | HEART RATE: 95 BPM | OXYGEN SATURATION: 95 % | SYSTOLIC BLOOD PRESSURE: 138 MMHG | HEIGHT: 72 IN | DIASTOLIC BLOOD PRESSURE: 74 MMHG | BODY MASS INDEX: 42.66 KG/M2

## 2021-08-03 DIAGNOSIS — I10 ESSENTIAL HYPERTENSION: Primary | ICD-10-CM

## 2021-08-03 DIAGNOSIS — I10 ESSENTIAL HYPERTENSION: ICD-10-CM

## 2021-08-03 LAB
ANION GAP SERPL CALC-SCNC: 5 MMOL/L (ref 8–16)
BUN SERPL-MCNC: 8 MG/DL (ref 6–20)
CALCIUM SERPL-MCNC: 9.4 MG/DL (ref 8.7–10.5)
CHLORIDE SERPL-SCNC: 100 MMOL/L (ref 95–110)
CO2 SERPL-SCNC: 32 MMOL/L (ref 23–29)
CREAT SERPL-MCNC: 0.9 MG/DL (ref 0.5–1.4)
EST. GFR  (AFRICAN AMERICAN): >60 ML/MIN/1.73 M^2
EST. GFR  (NON AFRICAN AMERICAN): >60 ML/MIN/1.73 M^2
GLUCOSE SERPL-MCNC: 174 MG/DL (ref 70–110)
POTASSIUM SERPL-SCNC: 3.6 MMOL/L (ref 3.5–5.1)
SODIUM SERPL-SCNC: 137 MMOL/L (ref 136–145)

## 2021-08-03 PROCEDURE — 1159F MED LIST DOCD IN RCRD: CPT | Mod: CPTII,S$GLB,, | Performed by: NURSE PRACTITIONER

## 2021-08-03 PROCEDURE — 1125F AMNT PAIN NOTED PAIN PRSNT: CPT | Mod: CPTII,S$GLB,, | Performed by: NURSE PRACTITIONER

## 2021-08-03 PROCEDURE — 3044F PR MOST RECENT HEMOGLOBIN A1C LEVEL <7.0%: ICD-10-PCS | Mod: CPTII,S$GLB,, | Performed by: NURSE PRACTITIONER

## 2021-08-03 PROCEDURE — 80048 BASIC METABOLIC PNL TOTAL CA: CPT | Performed by: NURSE PRACTITIONER

## 2021-08-03 PROCEDURE — 3044F HG A1C LEVEL LT 7.0%: CPT | Mod: CPTII,S$GLB,, | Performed by: NURSE PRACTITIONER

## 2021-08-03 PROCEDURE — 99999 PR PBB SHADOW E&M-EST. PATIENT-LVL V: CPT | Mod: PBBFAC,,, | Performed by: NURSE PRACTITIONER

## 2021-08-03 PROCEDURE — 99214 PR OFFICE/OUTPT VISIT, EST, LEVL IV, 30-39 MIN: ICD-10-PCS | Mod: S$GLB,,, | Performed by: NURSE PRACTITIONER

## 2021-08-03 PROCEDURE — 3008F BODY MASS INDEX DOCD: CPT | Mod: CPTII,S$GLB,, | Performed by: NURSE PRACTITIONER

## 2021-08-03 PROCEDURE — 3078F DIAST BP <80 MM HG: CPT | Mod: CPTII,S$GLB,, | Performed by: NURSE PRACTITIONER

## 2021-08-03 PROCEDURE — 36415 COLL VENOUS BLD VENIPUNCTURE: CPT | Mod: PO | Performed by: NURSE PRACTITIONER

## 2021-08-03 PROCEDURE — 3078F PR MOST RECENT DIASTOLIC BLOOD PRESSURE < 80 MM HG: ICD-10-PCS | Mod: CPTII,S$GLB,, | Performed by: NURSE PRACTITIONER

## 2021-08-03 PROCEDURE — 99214 OFFICE O/P EST MOD 30 MIN: CPT | Mod: S$GLB,,, | Performed by: NURSE PRACTITIONER

## 2021-08-03 PROCEDURE — 1160F RVW MEDS BY RX/DR IN RCRD: CPT | Mod: CPTII,S$GLB,, | Performed by: NURSE PRACTITIONER

## 2021-08-03 PROCEDURE — 1159F PR MEDICATION LIST DOCUMENTED IN MEDICAL RECORD: ICD-10-PCS | Mod: CPTII,S$GLB,, | Performed by: NURSE PRACTITIONER

## 2021-08-03 PROCEDURE — 1160F PR REVIEW ALL MEDS BY PRESCRIBER/CLIN PHARMACIST DOCUMENTED: ICD-10-PCS | Mod: CPTII,S$GLB,, | Performed by: NURSE PRACTITIONER

## 2021-08-03 PROCEDURE — 3008F PR BODY MASS INDEX (BMI) DOCUMENTED: ICD-10-PCS | Mod: CPTII,S$GLB,, | Performed by: NURSE PRACTITIONER

## 2021-08-03 PROCEDURE — 1125F PR PAIN SEVERITY QUANTIFIED, PAIN PRESENT: ICD-10-PCS | Mod: CPTII,S$GLB,, | Performed by: NURSE PRACTITIONER

## 2021-08-03 PROCEDURE — 3075F SYST BP GE 130 - 139MM HG: CPT | Mod: CPTII,S$GLB,, | Performed by: NURSE PRACTITIONER

## 2021-08-03 PROCEDURE — 3075F PR MOST RECENT SYSTOLIC BLOOD PRESS GE 130-139MM HG: ICD-10-PCS | Mod: CPTII,S$GLB,, | Performed by: NURSE PRACTITIONER

## 2021-08-03 PROCEDURE — 99999 PR PBB SHADOW E&M-EST. PATIENT-LVL V: ICD-10-PCS | Mod: PBBFAC,,, | Performed by: NURSE PRACTITIONER

## 2021-08-03 RX ORDER — AZELASTINE 1 MG/ML
SPRAY, METERED NASAL
Qty: 90 ML | Refills: 3 | Status: SHIPPED | OUTPATIENT
Start: 2021-08-03 | End: 2021-09-07

## 2021-09-06 ENCOUNTER — PATIENT MESSAGE (OUTPATIENT)
Dept: FAMILY MEDICINE | Facility: CLINIC | Age: 41
End: 2021-09-06

## 2021-09-06 DIAGNOSIS — R35.0 URINARY FREQUENCY: ICD-10-CM

## 2021-09-06 DIAGNOSIS — R35.1 NOCTURIA: ICD-10-CM

## 2021-09-06 DIAGNOSIS — N39.41 URGE INCONTINENCE: ICD-10-CM

## 2021-09-07 RX ORDER — SOLIFENACIN SUCCINATE 10 MG/1
10 TABLET, FILM COATED ORAL DAILY
Qty: 90 TABLET | Refills: 3 | Status: SHIPPED | OUTPATIENT
Start: 2021-09-07 | End: 2022-10-20 | Stop reason: SDUPTHER

## 2021-09-07 RX ORDER — SOLIFENACIN SUCCINATE 10 MG/1
10 TABLET, FILM COATED ORAL DAILY
Qty: 30 TABLET | Refills: 11 | Status: SHIPPED | OUTPATIENT
Start: 2021-09-07 | End: 2021-09-07

## 2021-09-08 ENCOUNTER — PATIENT MESSAGE (OUTPATIENT)
Dept: FAMILY MEDICINE | Facility: CLINIC | Age: 41
End: 2021-09-08

## 2021-09-08 DIAGNOSIS — J45.20 MILD INTERMITTENT ASTHMA WITHOUT COMPLICATION: Primary | ICD-10-CM

## 2021-09-08 DIAGNOSIS — I10 ESSENTIAL HYPERTENSION: ICD-10-CM

## 2021-09-08 RX ORDER — CARVEDILOL 25 MG/1
25 TABLET ORAL 2 TIMES DAILY WITH MEALS
Qty: 180 TABLET | Refills: 2 | Status: SHIPPED | OUTPATIENT
Start: 2021-09-08 | End: 2022-07-22 | Stop reason: SDUPTHER

## 2021-09-08 RX ORDER — ALBUTEROL SULFATE 90 UG/1
2 AEROSOL, METERED RESPIRATORY (INHALATION) EVERY 6 HOURS PRN
Qty: 18 G | Refills: 3 | Status: SHIPPED | OUTPATIENT
Start: 2021-09-08 | End: 2024-03-27 | Stop reason: SDUPTHER

## 2021-09-13 DIAGNOSIS — J45.909 ASTHMA, UNSPECIFIED ASTHMA SEVERITY, UNSPECIFIED WHETHER COMPLICATED, UNSPECIFIED WHETHER PERSISTENT: ICD-10-CM

## 2021-09-15 RX ORDER — LOSARTAN POTASSIUM 100 MG/1
100 TABLET ORAL DAILY
Qty: 90 TABLET | Refills: 0 | Status: SHIPPED | OUTPATIENT
Start: 2021-09-15 | End: 2021-12-10

## 2021-09-15 RX ORDER — AMLODIPINE BESYLATE 5 MG/1
5 TABLET ORAL DAILY
Qty: 90 TABLET | Refills: 0 | Status: SHIPPED | OUTPATIENT
Start: 2021-09-15 | End: 2021-11-22

## 2021-09-15 RX ORDER — FLUTICASONE PROPIONATE AND SALMETEROL 250; 50 UG/1; UG/1
POWDER RESPIRATORY (INHALATION)
Qty: 180 EACH | Refills: 0 | Status: SHIPPED | OUTPATIENT
Start: 2021-09-15 | End: 2021-12-09

## 2021-09-21 DIAGNOSIS — J45.909 ASTHMA, UNSPECIFIED ASTHMA SEVERITY, UNSPECIFIED WHETHER COMPLICATED, UNSPECIFIED WHETHER PERSISTENT: ICD-10-CM

## 2021-09-21 RX ORDER — FLUTICASONE PROPIONATE AND SALMETEROL 250; 50 UG/1; UG/1
POWDER RESPIRATORY (INHALATION)
Qty: 180 EACH | Refills: 0 | Status: CANCELLED | OUTPATIENT
Start: 2021-09-21

## 2021-11-22 ENCOUNTER — OFFICE VISIT (OUTPATIENT)
Dept: FAMILY MEDICINE | Facility: CLINIC | Age: 41
End: 2021-11-22
Payer: COMMERCIAL

## 2021-11-22 VITALS
HEART RATE: 76 BPM | BODY MASS INDEX: 42.66 KG/M2 | DIASTOLIC BLOOD PRESSURE: 92 MMHG | OXYGEN SATURATION: 97 % | SYSTOLIC BLOOD PRESSURE: 154 MMHG | WEIGHT: 315 LBS | HEIGHT: 72 IN

## 2021-11-22 DIAGNOSIS — J45.20 MILD INTERMITTENT ASTHMA WITHOUT COMPLICATION: ICD-10-CM

## 2021-11-22 DIAGNOSIS — E66.01 MORBID OBESITY: ICD-10-CM

## 2021-11-22 DIAGNOSIS — F32.5 MAJOR DEPRESSIVE DISORDER IN FULL REMISSION, UNSPECIFIED WHETHER RECURRENT: ICD-10-CM

## 2021-11-22 DIAGNOSIS — I10 UNCONTROLLED HYPERTENSION: Primary | ICD-10-CM

## 2021-11-22 PROCEDURE — 99214 PR OFFICE/OUTPT VISIT, EST, LEVL IV, 30-39 MIN: ICD-10-PCS | Mod: S$GLB,,, | Performed by: FAMILY MEDICINE

## 2021-11-22 PROCEDURE — 99214 OFFICE O/P EST MOD 30 MIN: CPT | Mod: S$GLB,,, | Performed by: FAMILY MEDICINE

## 2021-11-22 PROCEDURE — 99999 PR PBB SHADOW E&M-EST. PATIENT-LVL V: CPT | Mod: PBBFAC,,, | Performed by: FAMILY MEDICINE

## 2021-11-22 PROCEDURE — 99999 PR PBB SHADOW E&M-EST. PATIENT-LVL V: ICD-10-PCS | Mod: PBBFAC,,, | Performed by: FAMILY MEDICINE

## 2021-11-22 PROCEDURE — 4010F PR ACE/ARB THEARPY RXD/TAKEN: ICD-10-PCS | Mod: CPTII,S$GLB,, | Performed by: FAMILY MEDICINE

## 2021-11-22 PROCEDURE — 4010F ACE/ARB THERAPY RXD/TAKEN: CPT | Mod: CPTII,S$GLB,, | Performed by: FAMILY MEDICINE

## 2021-11-22 RX ORDER — AMLODIPINE BESYLATE 10 MG/1
10 TABLET ORAL DAILY
Qty: 30 TABLET | Refills: 2 | Status: SHIPPED | OUTPATIENT
Start: 2021-11-22 | End: 2022-02-22

## 2021-11-22 RX ORDER — INDAPAMIDE 2.5 MG/1
2.5 TABLET ORAL DAILY
Qty: 30 TABLET | Refills: 2 | Status: SHIPPED | OUTPATIENT
Start: 2021-11-22 | End: 2022-02-22

## 2022-01-03 ENCOUNTER — OFFICE VISIT (OUTPATIENT)
Dept: FAMILY MEDICINE | Facility: CLINIC | Age: 42
End: 2022-01-03
Payer: COMMERCIAL

## 2022-01-03 VITALS
OXYGEN SATURATION: 97 % | HEIGHT: 72 IN | BODY MASS INDEX: 42.66 KG/M2 | WEIGHT: 315 LBS | HEART RATE: 88 BPM | DIASTOLIC BLOOD PRESSURE: 86 MMHG | SYSTOLIC BLOOD PRESSURE: 132 MMHG

## 2022-01-03 DIAGNOSIS — I10 ESSENTIAL HYPERTENSION: Primary | ICD-10-CM

## 2022-01-03 DIAGNOSIS — E66.01 MORBID OBESITY: ICD-10-CM

## 2022-01-03 DIAGNOSIS — F32.5 MAJOR DEPRESSIVE DISORDER IN FULL REMISSION, UNSPECIFIED WHETHER RECURRENT: ICD-10-CM

## 2022-01-03 DIAGNOSIS — G47.33 OSA ON CPAP: ICD-10-CM

## 2022-01-03 PROCEDURE — 1159F PR MEDICATION LIST DOCUMENTED IN MEDICAL RECORD: ICD-10-PCS | Mod: CPTII,S$GLB,, | Performed by: FAMILY MEDICINE

## 2022-01-03 PROCEDURE — 3008F BODY MASS INDEX DOCD: CPT | Mod: CPTII,S$GLB,, | Performed by: FAMILY MEDICINE

## 2022-01-03 PROCEDURE — 3075F SYST BP GE 130 - 139MM HG: CPT | Mod: CPTII,S$GLB,, | Performed by: FAMILY MEDICINE

## 2022-01-03 PROCEDURE — 1159F MED LIST DOCD IN RCRD: CPT | Mod: CPTII,S$GLB,, | Performed by: FAMILY MEDICINE

## 2022-01-03 PROCEDURE — 3079F PR MOST RECENT DIASTOLIC BLOOD PRESSURE 80-89 MM HG: ICD-10-PCS | Mod: CPTII,S$GLB,, | Performed by: FAMILY MEDICINE

## 2022-01-03 PROCEDURE — 99999 PR PBB SHADOW E&M-EST. PATIENT-LVL IV: ICD-10-PCS | Mod: PBBFAC,,, | Performed by: FAMILY MEDICINE

## 2022-01-03 PROCEDURE — 99214 PR OFFICE/OUTPT VISIT, EST, LEVL IV, 30-39 MIN: ICD-10-PCS | Mod: S$GLB,,, | Performed by: FAMILY MEDICINE

## 2022-01-03 PROCEDURE — 99214 OFFICE O/P EST MOD 30 MIN: CPT | Mod: S$GLB,,, | Performed by: FAMILY MEDICINE

## 2022-01-03 PROCEDURE — 3079F DIAST BP 80-89 MM HG: CPT | Mod: CPTII,S$GLB,, | Performed by: FAMILY MEDICINE

## 2022-01-03 PROCEDURE — 3075F PR MOST RECENT SYSTOLIC BLOOD PRESS GE 130-139MM HG: ICD-10-PCS | Mod: CPTII,S$GLB,, | Performed by: FAMILY MEDICINE

## 2022-01-03 PROCEDURE — 99999 PR PBB SHADOW E&M-EST. PATIENT-LVL IV: CPT | Mod: PBBFAC,,, | Performed by: FAMILY MEDICINE

## 2022-01-03 PROCEDURE — 3008F PR BODY MASS INDEX (BMI) DOCUMENTED: ICD-10-PCS | Mod: CPTII,S$GLB,, | Performed by: FAMILY MEDICINE

## 2022-01-03 RX ORDER — BREXPIPRAZOLE 2 MG/1
1 TABLET ORAL DAILY
COMMUNITY
Start: 2021-12-17 | End: 2022-10-20

## 2022-01-03 NOTE — PROGRESS NOTES
Subjective:       Patient ID: Greg Garcia is a 41 y.o. male.    Chief Complaint: Follow-up and Fatigue    HPI     Here for a f/u     bp at home for the past 1-2 months.  130-140/80-90's     Seeing Dr. Olivas, medical psychologist, for anxiety/depression management.  On prozac and Rexulti.      Asthma controlled.      Morbid obesity monitored     isabel cpap controlled.     Review of Systems      Review of Systems   Constitutional: Negative for fever and chills.   HENT: Negative for hearing loss and neck stiffness.    Eyes: Negative for redness and itching.   Respiratory: Negative for cough and choking.    Cardiovascular: Negative for chest pain and leg swelling.  Abdomen: Negative for abdominal pain and blood in stool.   Genitourinary: Negative for dysuria and flank pain.   Musculoskeletal: Negative for back pain and gait problem.   Neurological: Negative for light-headedness and headaches.   Hematological: Negative for adenopathy.   Psychiatric/Behavioral: Negative for behavioral problems.     Objective:      Physical Exam  HENT:      Head: Atraumatic.   Eyes:      Conjunctiva/sclera: Conjunctivae normal.      Pupils: Pupils are equal, round, and reactive to light.   Cardiovascular:      Rate and Rhythm: Normal rate and regular rhythm.      Heart sounds: No murmur heard.      Pulmonary:      Effort: Pulmonary effort is normal.      Breath sounds: Normal breath sounds. No wheezing.   Musculoskeletal:      Cervical back: Normal range of motion.   Lymphadenopathy:      Cervical: No cervical adenopathy.         Assessment:       1. Essential hypertension    2. Morbid obesity    3. Major depressive disorder in full remission, unspecified whether recurrent    4. ISABEL on CPAP        Plan:       Essential hypertension    Morbid obesity    Major depressive disorder in full remission, unspecified whether recurrent    ISABEL on CPAP              Plan:  Pt to call his insurance re: wegovy and contrave  Cont current  meds      Medication List with Changes/Refills   Current Medications    ALBUTEROL (VENTOLIN HFA) 90 MCG/ACTUATION INHALER    Inhale 2 puffs into the lungs every 6 (six) hours as needed for Wheezing.    AMLODIPINE (NORVASC) 10 MG TABLET    Take 1 tablet (10 mg total) by mouth once daily.    AZELASTINE (ASTELIN) 137 MCG (0.1 %) NASAL SPRAY    1 spray (137 mcg total) by Nasal route 2 (two) times daily.    BREXPIPRAZOLE (REXULTI) 1 MG TAB    Take 1 mg by mouth.    CARVEDILOL (COREG) 25 MG TABLET    Take 1 tablet (25 mg total) by mouth 2 (two) times daily with meals.    CETIRIZINE-PSEUDOEPHEDRINE 5-120 MG TB12    Take 5 mg by mouth 2 (two) times daily.    CHOLECALCIFEROL, VITAMIN D3, 125 MCG (5,000 UNIT) TAB    Take 5,000 Units by mouth once daily.    CLINDAMYCIN (CLEOCIN T) 1 % EXTERNAL SOLUTION    Apply topically 2 (two) times daily.    ESOMEPRAZOLE (NEXIUM) 20 MG CAPSULE    Take 20 mg by mouth once daily.    FLUOXETINE 20 MG CAPSULE    Take 3 capsules (60 mg total) by mouth once daily.    FLUTICASONE-SALMETEROL DISKUS INHALER 250-50 MCG    USE 1 INHALATION ONCE DAILY AS DIRECTED (CONTROLLER).  EXPIRES 30 DAYS AFTER FIRST USE    GLUCOSAMINE-CHONDROITIN 500-400 MG TABLET    Take 1 tablet by mouth 2 (two) times daily.    IBUPROFEN (ADVIL,MOTRIN) 200 MG TABLET    Take 200 mg by mouth 2 (two) times daily. 4 tab twice daily    INDAPAMIDE (LOZOL) 2.5 MG TAB    Take 1 tablet (2.5 mg total) by mouth once daily.    KETOCONAZOLE (NIZORAL) 2 % CREAM    APPLY A THIN FILM TO AFFECTED AREA OF GROIN DAILY AS NEEDED FOR FLARE    KETOCONAZOLE (NIZORAL) 2 % SHAMPOO    Wash hair with medicated shampoo at least 2x/week - let sit on scalp at least 5 minutes prior to rinsing    LACTOBAC 42-BIFID 3-KTSNTN-CUL -50 MG PWPK    Take 1 Package by mouth.    LOSARTAN (COZAAR) 100 MG TABLET    TAKE 1 TABLET DAILY    MONTELUKAST (SINGULAIR) 10 MG TABLET    Take 1 tablet (10 mg total) by mouth every evening.    MULTIVITAMIN (THERAGRAN) PER  TABLET    Take 1 tablet by mouth once daily.    MUPIROCIN (BACTROBAN) 2 % OINTMENT    Apply topically 3 (three) times daily.    REXULTI 2 MG TAB    Take 1 tablet by mouth once daily.    SOLIFENACIN (VESICARE) 10 MG TABLET    Take 1 tablet (10 mg total) by mouth once daily.    TRIAMCINOLONE ACETONIDE 0.1% (KENALOG) 0.1 % CREAM    Apply topically 2 (two) times daily. X 2 wks then off 2 wks. Repeat as needed

## 2022-01-06 ENCOUNTER — PATIENT MESSAGE (OUTPATIENT)
Dept: FAMILY MEDICINE | Facility: CLINIC | Age: 42
End: 2022-01-06
Payer: COMMERCIAL

## 2022-01-12 ENCOUNTER — PATIENT MESSAGE (OUTPATIENT)
Dept: FAMILY MEDICINE | Facility: CLINIC | Age: 42
End: 2022-01-12
Payer: COMMERCIAL

## 2022-01-25 ENCOUNTER — PATIENT MESSAGE (OUTPATIENT)
Dept: FAMILY MEDICINE | Facility: CLINIC | Age: 42
End: 2022-01-25
Payer: COMMERCIAL

## 2022-01-25 NOTE — TELEPHONE ENCOUNTER
Follow up on medication request . Please advise if you would like to have him schedule an apt. Thank you

## 2022-01-31 ENCOUNTER — PATIENT MESSAGE (OUTPATIENT)
Dept: FAMILY MEDICINE | Facility: CLINIC | Age: 42
End: 2022-01-31
Payer: COMMERCIAL

## 2022-01-31 RX ORDER — NALTREXONE HYDROCHLORIDE AND BUPROPION HYDROCHLORIDE 8; 90 MG/1; MG/1
TABLET, EXTENDED RELEASE ORAL
Qty: 70 TABLET | Refills: 0 | Status: SHIPPED | OUTPATIENT
Start: 2022-01-31 | End: 2022-03-16 | Stop reason: SDUPTHER

## 2022-02-20 ENCOUNTER — PATIENT MESSAGE (OUTPATIENT)
Dept: FAMILY MEDICINE | Facility: CLINIC | Age: 42
End: 2022-02-20
Payer: COMMERCIAL

## 2022-02-21 NOTE — TELEPHONE ENCOUNTER
No new care gaps identified.  Powered by [employer]. Reference number: [id number].   2/20/2022 9:08:04 PM CST

## 2022-02-22 RX ORDER — AMLODIPINE BESYLATE 10 MG/1
TABLET ORAL
Qty: 90 TABLET | Refills: 3 | Status: SHIPPED | OUTPATIENT
Start: 2022-02-22 | End: 2022-04-22 | Stop reason: SDUPTHER

## 2022-02-22 RX ORDER — INDAPAMIDE 2.5 MG/1
TABLET ORAL
Qty: 90 TABLET | Refills: 1 | Status: SHIPPED | OUTPATIENT
Start: 2022-02-22 | End: 2022-04-22 | Stop reason: SDUPTHER

## 2022-02-22 NOTE — TELEPHONE ENCOUNTER
Refill Authorization Note   Greg Garcia  is requesting a refill authorization.  Brief Assessment and Rationale for Refill:  Approve     Medication Therapy Plan:       Medication Reconciliation Completed: No   Comments:   --->Care Gap information included below if applicable.       Requested Prescriptions   Pending Prescriptions Disp Refills    amLODIPine (NORVASC) 10 MG tablet [Pharmacy Med Name: amLODIPine Besylate 10 MG Oral Tablet] 90 tablet 3     Sig: Take 1 tablet by mouth once daily       Cardiovascular:  Calcium Channel Blockers Passed - 2/22/2022 10:57 AM        Passed - Patient is at least 18 years old        Passed - Last BP in normal range within 360 days     BP Readings from Last 1 Encounters:   01/03/22 132/86               Passed - Valid encounter within last 15 months     Recent Visits  Date Type Provider Dept   01/03/22 Office Visit Ignacio Terrell MD [facility] Family Medicine   11/22/21 Office Visit Ignacio Terrell MD [facility]   10/27/20 Office Visit Ignacio Terrell MD [facility] Medicine   07/14/20 Office Visit Ignacio Terrell MD [facility]   Showing recent visits within past 720 days and meeting all other requirements  Future Appointments  No visits were found meeting these conditions.  Showing future appointments within next 150 days and meeting all other requirements      Future Appointments              In 1 week Ignacio Terrell MD [facility]    In 3 weeks [facility]    In 1 month Ignacio Terrell MD [facility]                  indapamide (LOZOL) 2.5 MG Tab [Pharmacy Med Name: Indapamide 2.5 MG Oral Tablet] 90 tablet 1     Sig: Take 1 tablet by mouth once daily       Cardiovascular: Diuretics - Thiazide Passed - 2/22/2022 10:57 AM        Passed - Patient is at least 18 years old        Passed - Last BP in normal range within 360 days     BP Readings from  Last 1 Encounters:   01/03/22 132/86               Passed - Valid encounter within last 15 months     Recent Visits  Date Type Provider Dept   01/03/22 Office Visit Ignacio Terrell MD [facility] Family Medicine   11/22/21 Office Visit Ignacio Terrell MD [facility]   10/27/20 Office Visit Ignacio Terrell MD [facility]   07/14/20 Office Visit Ignacio Terrell MD [facility]   Showing recent visits within past 720 days and meeting all other requirements  Future Appointments  No visits were found meeting these conditions.  Showing future appointments within next 150 days and meeting all other requirements      Future Appointments              In 1 week Ignacio Terrell MD [facility]    In 3 weeks [facility]    In 1 month Ignacio Terrell MD [facility]                Passed - Cr is 1.39 or below and within 360 days     Lab Results   Component Value Date    CREATININE 0.9 08/03/2021    CREATININE 1.0 07/12/2021    CREATININE 1.0 08/12/2020              Passed - K in normal range and within 360 days     Potassium   Date Value Ref Range Status   08/03/2021 3.6 3.5 - 5.1 mmol/L Final   07/12/2021 3.9 3.5 - 5.1 mmol/L Final   08/12/2020 4.3 3.5 - 5.1 mmol/L Final              Passed - Na is between 130 and 148 and within 360 days     Sodium   Date Value Ref Range Status   08/03/2021 137 136 - 145 mmol/L Final   07/12/2021 140 136 - 145 mmol/L Final   08/12/2020 140 136 - 145 mmol/L Final              Passed - eGFR within 360 days     Lab Results   Component Value Date    EGFRNONAA >60.0 08/03/2021    EGFRNONAA >60.0 07/12/2021    EGFRNONAA >60.0 08/12/2020                    Appointments  past 12m or future 3m with PCP    Date Provider   Last Visit   1/3/2022 Ignacio Terrell MD   Next Visit   3/4/2022 Ignacio Terrell MD   ED visits in past 90 days: 0     Note composed:10:59 AM  02/22/2022

## 2022-03-04 ENCOUNTER — OFFICE VISIT (OUTPATIENT)
Dept: FAMILY MEDICINE | Facility: CLINIC | Age: 42
End: 2022-03-04
Payer: COMMERCIAL

## 2022-03-04 VITALS
WEIGHT: 315 LBS | DIASTOLIC BLOOD PRESSURE: 74 MMHG | SYSTOLIC BLOOD PRESSURE: 116 MMHG | HEART RATE: 75 BPM | BODY MASS INDEX: 60.76 KG/M2 | OXYGEN SATURATION: 95 %

## 2022-03-04 DIAGNOSIS — E66.01 MORBID OBESITY: Primary | ICD-10-CM

## 2022-03-04 DIAGNOSIS — J45.20 MILD INTERMITTENT ASTHMA WITHOUT COMPLICATION: ICD-10-CM

## 2022-03-04 DIAGNOSIS — G47.33 OSA ON CPAP: ICD-10-CM

## 2022-03-04 DIAGNOSIS — F32.5 MAJOR DEPRESSIVE DISORDER IN FULL REMISSION, UNSPECIFIED WHETHER RECURRENT: ICD-10-CM

## 2022-03-04 DIAGNOSIS — I10 ESSENTIAL HYPERTENSION: ICD-10-CM

## 2022-03-04 PROCEDURE — 99999 PR PBB SHADOW E&M-EST. PATIENT-LVL IV: CPT | Mod: PBBFAC,,, | Performed by: FAMILY MEDICINE

## 2022-03-04 PROCEDURE — 3008F PR BODY MASS INDEX (BMI) DOCUMENTED: ICD-10-PCS | Mod: CPTII,S$GLB,, | Performed by: FAMILY MEDICINE

## 2022-03-04 PROCEDURE — 99999 PR PBB SHADOW E&M-EST. PATIENT-LVL IV: ICD-10-PCS | Mod: PBBFAC,,, | Performed by: FAMILY MEDICINE

## 2022-03-04 PROCEDURE — 99214 PR OFFICE/OUTPT VISIT, EST, LEVL IV, 30-39 MIN: ICD-10-PCS | Mod: S$GLB,,, | Performed by: FAMILY MEDICINE

## 2022-03-04 PROCEDURE — 3078F DIAST BP <80 MM HG: CPT | Mod: CPTII,S$GLB,, | Performed by: FAMILY MEDICINE

## 2022-03-04 PROCEDURE — 1159F MED LIST DOCD IN RCRD: CPT | Mod: CPTII,S$GLB,, | Performed by: FAMILY MEDICINE

## 2022-03-04 PROCEDURE — 3008F BODY MASS INDEX DOCD: CPT | Mod: CPTII,S$GLB,, | Performed by: FAMILY MEDICINE

## 2022-03-04 PROCEDURE — 4010F ACE/ARB THERAPY RXD/TAKEN: CPT | Mod: CPTII,S$GLB,, | Performed by: FAMILY MEDICINE

## 2022-03-04 PROCEDURE — 4010F PR ACE/ARB THEARPY RXD/TAKEN: ICD-10-PCS | Mod: CPTII,S$GLB,, | Performed by: FAMILY MEDICINE

## 2022-03-04 PROCEDURE — 99214 OFFICE O/P EST MOD 30 MIN: CPT | Mod: S$GLB,,, | Performed by: FAMILY MEDICINE

## 2022-03-04 PROCEDURE — 1159F PR MEDICATION LIST DOCUMENTED IN MEDICAL RECORD: ICD-10-PCS | Mod: CPTII,S$GLB,, | Performed by: FAMILY MEDICINE

## 2022-03-04 PROCEDURE — 3074F SYST BP LT 130 MM HG: CPT | Mod: CPTII,S$GLB,, | Performed by: FAMILY MEDICINE

## 2022-03-04 PROCEDURE — 3074F PR MOST RECENT SYSTOLIC BLOOD PRESSURE < 130 MM HG: ICD-10-PCS | Mod: CPTII,S$GLB,, | Performed by: FAMILY MEDICINE

## 2022-03-04 PROCEDURE — 3078F PR MOST RECENT DIASTOLIC BLOOD PRESSURE < 80 MM HG: ICD-10-PCS | Mod: CPTII,S$GLB,, | Performed by: FAMILY MEDICINE

## 2022-03-04 NOTE — PROGRESS NOTES
Subjective:       Patient ID: Greg Garcia is a 41 y.o. male.    Chief Complaint: Med Change Follow Up    HPI     Here for a f/u    Has started on contrave 1 month ago. Has lost 10 lbs.    Seeing Dr. Olivas, medical psychologist, for anxiety/depression management.  On prozac and Rexulti.      Asthma controlled.      Morbid obesity monitored      isabel cpap controlled.     htn stable.     Review of Systems      Review of Systems   Constitutional: Negative for fever and chills.   HENT: Negative for hearing loss and neck stiffness.    Eyes: Negative for redness and itching.   Respiratory: Negative for cough and choking.    Cardiovascular: Negative for chest pain and leg swelling.  Abdomen: Negative for abdominal pain and blood in stool.   Genitourinary: Negative for dysuria and flank pain.   Musculoskeletal: Negative for back pain and gait problem.   Neurological: Negative for light-headedness and headaches.   Hematological: Negative for adenopathy.   Psychiatric/Behavioral: Negative for behavioral problems.     Objective:      Physical Exam  HENT:      Head: Atraumatic.   Eyes:      Conjunctiva/sclera: Conjunctivae normal.      Pupils: Pupils are equal, round, and reactive to light.   Cardiovascular:      Rate and Rhythm: Normal rate and regular rhythm.      Heart sounds: No murmur heard.  Pulmonary:      Effort: Pulmonary effort is normal.      Breath sounds: Normal breath sounds. No wheezing.   Musculoskeletal:      Cervical back: Normal range of motion.   Lymphadenopathy:      Cervical: No cervical adenopathy.         Assessment:       1. Morbid obesity    2. Major depressive disorder in full remission, unspecified whether recurrent    3. Essential hypertension    4. Mild intermittent asthma without complication    5. ISABEL on CPAP        Plan:       Morbid obesity    Major depressive disorder in full remission, unspecified whether recurrent    Essential hypertension    Mild intermittent asthma without  complication    JOHN on CPAP              Plan:  otc metamucil and miralax        Medication List with Changes/Refills   Current Medications    ALBUTEROL (VENTOLIN HFA) 90 MCG/ACTUATION INHALER    Inhale 2 puffs into the lungs every 6 (six) hours as needed for Wheezing.    AMLODIPINE (NORVASC) 10 MG TABLET    Take 1 tablet by mouth once daily    AZELASTINE (ASTELIN) 137 MCG (0.1 %) NASAL SPRAY    1 spray (137 mcg total) by Nasal route 2 (two) times daily.    BREXPIPRAZOLE (REXULTI) 1 MG TAB    Take 1 mg by mouth.    CARVEDILOL (COREG) 25 MG TABLET    Take 1 tablet (25 mg total) by mouth 2 (two) times daily with meals.    CETIRIZINE-PSEUDOEPHEDRINE 5-120 MG TB12    Take 5 mg by mouth 2 (two) times daily.    CHOLECALCIFEROL, VITAMIN D3, 125 MCG (5,000 UNIT) TAB    Take 5,000 Units by mouth once daily.    CLINDAMYCIN (CLEOCIN T) 1 % EXTERNAL SOLUTION    Apply topically 2 (two) times daily.    ESOMEPRAZOLE (NEXIUM) 20 MG CAPSULE    Take 20 mg by mouth once daily.    FLUOXETINE 20 MG CAPSULE    Take 3 capsules (60 mg total) by mouth once daily.    FLUTICASONE-SALMETEROL DISKUS INHALER 250-50 MCG    USE 1 INHALATION ONCE DAILY AS DIRECTED (CONTROLLER).  EXPIRES 30 DAYS AFTER FIRST USE    GLUCOSAMINE-CHONDROITIN 500-400 MG TABLET    Take 1 tablet by mouth 2 (two) times daily.    IBUPROFEN (ADVIL,MOTRIN) 200 MG TABLET    Take 200 mg by mouth 2 (two) times daily. 4 tab twice daily    INDAPAMIDE (LOZOL) 2.5 MG TAB    Take 1 tablet by mouth once daily    KETOCONAZOLE (NIZORAL) 2 % CREAM    APPLY A THIN FILM TO AFFECTED AREA OF GROIN DAILY AS NEEDED FOR FLARE    KETOCONAZOLE (NIZORAL) 2 % SHAMPOO    Wash hair with medicated shampoo at least 2x/week - let sit on scalp at least 5 minutes prior to rinsing    LACTOBAC 42-BIFID 9-[id number] -50 MG PWPK    Take 1 Package by mouth.    LOSARTAN (COZAAR) 100 MG TABLET    TAKE 1 TABLET DAILY    MONTELUKAST (SINGULAIR) 10 MG TABLET    Take 1 tablet (10 mg total) by mouth every  evening.    MULTIVITAMIN (THERAGRAN) PER TABLET    Take 1 tablet by mouth once daily.    MUPIROCIN (BACTROBAN) 2 % OINTMENT    Apply topically 3 (three) times daily.    NALTREXONE-BUPROPION (CONTRAVE) 8-90 MG TBSR    One tablet (naltrexone 8 mg/bupropion 90 mg) once daily in the morning for 1 week; at week 2, increase to 1 tablet twice daily administered in the morning and evening and continue for 1 week; at week 3, increase to 2 tablets in the morning and 1 tablet in the evening and continue for 1 week; at week 4, increase to 2 tablets twice daily administered in the morning and evening and continue for the remainder of the treatment course.    NALTREXONE-BUPROPION (CONTRAVE) 8-90 MG TBSR    Continuation pack: 2 tablets twice a day    REXULTI 2 MG TAB    Take 1 tablet by mouth once daily.    SOLIFENACIN (VESICARE) 10 MG TABLET    Take 1 tablet (10 mg total) by mouth once daily.    TRIAMCINOLONE ACETONIDE 0.1% (KENALOG) 0.1 % CREAM    Apply topically 2 (two) times daily. X 2 wks then off 2 wks. Repeat as needed

## 2022-03-07 ENCOUNTER — PATIENT MESSAGE (OUTPATIENT)
Dept: FAMILY MEDICINE | Facility: CLINIC | Age: 42
End: 2022-03-07
Payer: COMMERCIAL

## 2022-03-08 ENCOUNTER — PATIENT MESSAGE (OUTPATIENT)
Dept: FAMILY MEDICINE | Facility: CLINIC | Age: 42
End: 2022-03-08
Payer: COMMERCIAL

## 2022-03-12 ENCOUNTER — PATIENT OUTREACH (OUTPATIENT)
Dept: ADMINISTRATIVE | Facility: OTHER | Age: 42
End: 2022-03-12
Payer: COMMERCIAL

## 2022-03-13 NOTE — PROGRESS NOTES
Health Maintenance Due   Topic Date Due    Pneumococcal Vaccines (Age 0-64) (1 of 2 - PPSV23) Never done    COVID-19 Vaccine (3 - Booster for Moderna series) 09/08/2021     Updates were requested from care everywhere.  Chart was reviewed for overdue Proactive [facility] Encounters ([facility]) topics (CRS, Breast Cancer Screening, Eye exam)  Health Maintenance has been updated.  LINKS immunization registry triggered.  Immunizations were reconciled.

## 2022-03-16 ENCOUNTER — OFFICE VISIT (OUTPATIENT)
Dept: UROLOGY | Facility: CLINIC | Age: 42
End: 2022-03-16
Payer: COMMERCIAL

## 2022-03-16 VITALS — BODY MASS INDEX: 42.66 KG/M2 | WEIGHT: 315 LBS | HEIGHT: 72 IN

## 2022-03-16 DIAGNOSIS — R35.1 NOCTURIA: Primary | ICD-10-CM

## 2022-03-16 DIAGNOSIS — R35.0 URINARY FREQUENCY: ICD-10-CM

## 2022-03-16 PROCEDURE — 99214 OFFICE O/P EST MOD 30 MIN: CPT | Mod: S$GLB,,, | Performed by: UROLOGY

## 2022-03-16 PROCEDURE — 1160F RVW MEDS BY RX/DR IN RCRD: CPT | Mod: CPTII,S$GLB,, | Performed by: UROLOGY

## 2022-03-16 PROCEDURE — 1159F PR MEDICATION LIST DOCUMENTED IN MEDICAL RECORD: ICD-10-PCS | Mod: CPTII,S$GLB,, | Performed by: UROLOGY

## 2022-03-16 PROCEDURE — 3008F PR BODY MASS INDEX (BMI) DOCUMENTED: ICD-10-PCS | Mod: CPTII,S$GLB,, | Performed by: UROLOGY

## 2022-03-16 PROCEDURE — 1159F MED LIST DOCD IN RCRD: CPT | Mod: CPTII,S$GLB,, | Performed by: UROLOGY

## 2022-03-16 PROCEDURE — 3008F BODY MASS INDEX DOCD: CPT | Mod: CPTII,S$GLB,, | Performed by: UROLOGY

## 2022-03-16 PROCEDURE — 99999 PR PBB SHADOW E&M-EST. PATIENT-LVL IV: ICD-10-PCS | Mod: PBBFAC,,, | Performed by: UROLOGY

## 2022-03-16 PROCEDURE — 99999 PR PBB SHADOW E&M-EST. PATIENT-LVL IV: CPT | Mod: PBBFAC,,, | Performed by: UROLOGY

## 2022-03-16 PROCEDURE — 4010F PR ACE/ARB THEARPY RXD/TAKEN: ICD-10-PCS | Mod: CPTII,S$GLB,, | Performed by: UROLOGY

## 2022-03-16 PROCEDURE — 4010F ACE/ARB THERAPY RXD/TAKEN: CPT | Mod: CPTII,S$GLB,, | Performed by: UROLOGY

## 2022-03-16 PROCEDURE — 99214 PR OFFICE/OUTPT VISIT, EST, LEVL IV, 30-39 MIN: ICD-10-PCS | Mod: S$GLB,,, | Performed by: UROLOGY

## 2022-03-16 PROCEDURE — 1160F PR REVIEW ALL MEDS BY PRESCRIBER/CLIN PHARMACIST DOCUMENTED: ICD-10-PCS | Mod: CPTII,S$GLB,, | Performed by: UROLOGY

## 2022-03-16 RX ORDER — INFLUENZA A VIRUS A/VICTORIA/2570/2019 IVR-215 (H1N1) ANTIGEN (FORMALDEHYDE INACTIVATED), INFLUENZA A VIRUS A/TASMANIA/503/2020 IVR-221 (H3N2) ANTIGEN (FORMALDEHYDE INACTIVATED), INFLUENZA B VIRUS B/PHUKET/3073/2013 ANTIGEN (FORMALDEHYDE INACTIVATED), AND INFLUENZA B VIRUS B/WASHINGTON/02/2019 ANTIGEN (FORMALDEHYDE INACTIVATED) 15; 15; 15; 15 UG/.5ML; UG/.5ML; UG/.5ML; UG/.5ML
INJECTION, SUSPENSION INTRAMUSCULAR
COMMUNITY
Start: 2021-12-19 | End: 2022-03-16

## 2022-03-16 NOTE — PROGRESS NOTES
UROLOGY [place]  3 16 22     Cc urinary frequency     Age 41, comes in as routine urologic visit. No new problems are happening. Has hx of prostatitis but no recent recurrences. Voids with a bit of frequency, but that is related to his hydration status. has improved with use of vesicare 10 mg daily. He has nocturia x 1-2. No pains or burning when voiding. Has some urgency with rare instances of urine loss if he cannot reach the bathroom in time.     Pt gives a hx of nocturnal enuresis during childhood that lasted to age 15, then subsided.        psa 1.3 jan 2020        PMH     Surgical:  circumcision     Medical:  has a past medical history of AR (allergic rhinitis), Asthma, Cervicalgia, Depression, JOHN (obstructive sleep apnea), and Primary nocturnal enuresis. massive obesity BMI 60     Familial: no fh of renal disease     Social: single, lives in [place]     Meds:               Current Outpatient Medications on File Prior to Visit   Medication Sig Dispense Refill    albuterol sulfate (VENTOLIN HFA INHL) Inhale 2 puffs into the lung        amLODIPine (NORVA   30 tablet 11    azelastine (ASTELIN) 137 mcg (0.1 %) nasal spray USE 1 SPRAY(S) IN EACH 90 mL 3    buPROPion (WELLBUTR          carvedilol (COREG) 25 MG t TAKE 1 TABLEH  180 tablet 3    cetirizine-pseudoephedrine 5-120 mg Tb12 Take 5 mg by mouth 2 (two) 48 tablet 2    cholecalciferol, vitamin D3, (VITAMIN D3) 5,000 unit Tab Take 5,000 Units by mouth once daily.        esomeprazole (NEXIUM) 20 MG capsule Take 20 mg by mouth once daily.        FLUoxetine 20 MG capsule Take 3 capsul 270 capsule 3    fluticasone-salmeterol diskus inhaler 250-50 mcg INHALE 1 DOSE BY MOUTH 180 each 2    glucosamine-chondroitin 500-400 mg tablet Take 1 tablet by mouth 2 (two)        ibuprofen (ADVIL,MOTRIN) 200 MG tablet Take 200 mg by mouth 2 (tw        ketoconazole (NIZORAL) 2 %  APPLY A THIN FILM  60 g 5    losartan (COZAAR) 100 MG tablet TAKE 1 TABLET BY  MOUTH ONC 90 tablet 3    montelukast (SINGULAIR) 10 mg tablet TAKE 1 TABLET BY MOUTH O 90 tablet 1    multivitamin (ONE DAILY MULTIVITAMIN) per tablet Take 1 tablet by mouth once daily.        ondansetron (ZOFRAN) 4 MG tablet Take 1 tablet (4 mg total) b 12 tablet 0     REVIEW OF SYSTEMS  GENERAL:  No headaches or dizzy spells.   HEENT: vision preserved. Sinuses: has complaints.   CARDIOPULMONARY: No swelling of the legs; no chest pain. No shortness of breath.   GASTROINTESTINAL: has heartburn. Denies diarrhea; occasional constipation, no blood or mucus in stools.   GENITOURINARY: has urinary frequency and nocturia. Rare urgency with small loss of urine.    MUSCULOSKELETAL: No arthritic complaints such as pain or stiffness.   PSYCHIATRIC: No history of depression or anxiety.   ENDOCRINOLOGIC: has morbid obesity. No reports of sweating, cold or heat intolerance. No polyuria or polydipsia.   NEUROLOGICAL: No dizziness, syncope, paralysis  LYMPHATICS: No enlarged nodes. No history of splenectomy.  ==     Pt alert, oriented, no distress  HEENT: wnl.  Neck: supple, no JVD, no lymphadenopathy  Chest: CV NSR  Lungs: normal chest expansion, no labored breathing  Abdomen prominent, nontender, no organomegaly, no masses.  No hernias  Penis circumcised, meatus nl  Testes nl, epi nl, scrotum nl  GENESIS: anus nl, sphincter nl tone, mucosa without lesions, prostate 30 gm, no midline furrow, no nodules or indurations  Extremities: no edema, peripheral pulses nl  Neuro: preserved        IMP     Urinary frequency, urge incontinence, mild overactive bladder, doing well on vesicare 10  Nocturia. Can diminish fluid intake in the evenings  Hx of nocturia enuresis until age 15  Hx of acute prostatitis  Pt reassured, can RTC as needed

## 2022-03-16 NOTE — TELEPHONE ENCOUNTER
No new care gaps identified.  Powered by [employer]. Reference number: [id number].   3/16/2022 2:02:07 PM CDT

## 2022-04-22 DIAGNOSIS — I10 ESSENTIAL HYPERTENSION: Primary | ICD-10-CM

## 2022-04-22 RX ORDER — AMLODIPINE BESYLATE 10 MG/1
10 TABLET ORAL DAILY
Qty: 90 TABLET | Refills: 3 | Status: SHIPPED | OUTPATIENT
Start: 2022-04-22 | End: 2022-05-06 | Stop reason: SDUPTHER

## 2022-04-22 RX ORDER — INDAPAMIDE 2.5 MG/1
2.5 TABLET ORAL DAILY
Qty: 90 TABLET | Refills: 1 | Status: SHIPPED | OUTPATIENT
Start: 2022-04-22 | End: 2022-05-06 | Stop reason: SDUPTHER

## 2022-04-22 NOTE — TELEPHONE ENCOUNTER
Refill Authorization Note   Greg Radha  is requesting a refill authorization.  Brief Assessment and Rationale for Refill:  Approve     Medication Therapy Plan:  Request sent to home pharmacy    Medication Reconciliation Completed: No   Comments:     No Care Gaps recommended.     Note composed:1:39 PM 04/22/2022

## 2022-04-22 NOTE — TELEPHONE ENCOUNTER
No new care gaps identified.  Powered by [employer]. Reference number: [id number].   4/22/2022 10:44:58 AM CDT

## 2022-04-28 ENCOUNTER — TELEPHONE (OUTPATIENT)
Dept: FAMILY MEDICINE | Facility: CLINIC | Age: 42
End: 2022-04-28
Payer: COMMERCIAL

## 2022-05-05 ENCOUNTER — TELEPHONE (OUTPATIENT)
Dept: FAMILY MEDICINE | Facility: CLINIC | Age: 42
End: 2022-05-05

## 2022-05-06 RX ORDER — AMLODIPINE BESYLATE 10 MG/1
10 TABLET ORAL DAILY
Qty: 90 TABLET | Refills: 3 | Status: SHIPPED | OUTPATIENT
Start: 2022-05-06 | End: 2023-06-22

## 2022-05-06 RX ORDER — INDAPAMIDE 2.5 MG/1
2.5 TABLET ORAL DAILY
Qty: 90 TABLET | Refills: 1 | Status: SHIPPED | OUTPATIENT
Start: 2022-05-06 | End: 2022-12-21

## 2022-07-22 ENCOUNTER — OFFICE VISIT (OUTPATIENT)
Dept: FAMILY MEDICINE | Facility: CLINIC | Age: 42
End: 2022-07-22
Payer: COMMERCIAL

## 2022-07-22 VITALS
HEART RATE: 83 BPM | DIASTOLIC BLOOD PRESSURE: 80 MMHG | BODY MASS INDEX: 56.84 KG/M2 | WEIGHT: 315 LBS | SYSTOLIC BLOOD PRESSURE: 130 MMHG | OXYGEN SATURATION: 95 %

## 2022-07-22 DIAGNOSIS — G47.33 OSA ON CPAP: ICD-10-CM

## 2022-07-22 DIAGNOSIS — E66.01 MORBID OBESITY: ICD-10-CM

## 2022-07-22 DIAGNOSIS — J45.20 MILD INTERMITTENT ASTHMA WITHOUT COMPLICATION: ICD-10-CM

## 2022-07-22 DIAGNOSIS — I10 ESSENTIAL HYPERTENSION: Primary | ICD-10-CM

## 2022-07-22 DIAGNOSIS — F33.9 RECURRENT MAJOR DEPRESSIVE DISORDER, REMISSION STATUS UNSPECIFIED: ICD-10-CM

## 2022-07-22 PROCEDURE — 4010F PR ACE/ARB THEARPY RXD/TAKEN: ICD-10-PCS | Mod: CPTII,S$GLB,, | Performed by: FAMILY MEDICINE

## 2022-07-22 PROCEDURE — 3008F PR BODY MASS INDEX (BMI) DOCUMENTED: ICD-10-PCS | Mod: CPTII,S$GLB,, | Performed by: FAMILY MEDICINE

## 2022-07-22 PROCEDURE — 99999 PR PBB SHADOW E&M-EST. PATIENT-LVL III: ICD-10-PCS | Mod: PBBFAC,,, | Performed by: FAMILY MEDICINE

## 2022-07-22 PROCEDURE — 99999 PR PBB SHADOW E&M-EST. PATIENT-LVL III: CPT | Mod: PBBFAC,,, | Performed by: FAMILY MEDICINE

## 2022-07-22 PROCEDURE — 3079F DIAST BP 80-89 MM HG: CPT | Mod: CPTII,S$GLB,, | Performed by: FAMILY MEDICINE

## 2022-07-22 PROCEDURE — 3079F PR MOST RECENT DIASTOLIC BLOOD PRESSURE 80-89 MM HG: ICD-10-PCS | Mod: CPTII,S$GLB,, | Performed by: FAMILY MEDICINE

## 2022-07-22 PROCEDURE — 99214 OFFICE O/P EST MOD 30 MIN: CPT | Mod: S$GLB,,, | Performed by: FAMILY MEDICINE

## 2022-07-22 PROCEDURE — 3075F PR MOST RECENT SYSTOLIC BLOOD PRESS GE 130-139MM HG: ICD-10-PCS | Mod: CPTII,S$GLB,, | Performed by: FAMILY MEDICINE

## 2022-07-22 PROCEDURE — 99214 PR OFFICE/OUTPT VISIT, EST, LEVL IV, 30-39 MIN: ICD-10-PCS | Mod: S$GLB,,, | Performed by: FAMILY MEDICINE

## 2022-07-22 PROCEDURE — 4010F ACE/ARB THERAPY RXD/TAKEN: CPT | Mod: CPTII,S$GLB,, | Performed by: FAMILY MEDICINE

## 2022-07-22 PROCEDURE — 3075F SYST BP GE 130 - 139MM HG: CPT | Mod: CPTII,S$GLB,, | Performed by: FAMILY MEDICINE

## 2022-07-22 PROCEDURE — 3008F BODY MASS INDEX DOCD: CPT | Mod: CPTII,S$GLB,, | Performed by: FAMILY MEDICINE

## 2022-07-22 RX ORDER — CARVEDILOL 25 MG/1
25 TABLET ORAL 2 TIMES DAILY WITH MEALS
Qty: 180 TABLET | Refills: 3 | Status: SHIPPED | OUTPATIENT
Start: 2022-07-22 | End: 2023-06-22

## 2022-07-22 NOTE — PROGRESS NOTES
Subjective:       Patient ID: Greg Garcia is a 42 y.o. male.    Chief Complaint: Follow-up (2 month follow up. /Medication is working for weight loss and bp/Depression and anxiety have been a little rough right now pt states/Is feeling a lot of anxiety today)    HPI     Here for a f/u     Has started on contrave in February 2022. Starting weight: 457 lbs. Today: weight is 419 lbs.      Seeing Dr. Olivas, medical psychologist, for anxiety/depression management.  On prozac and Rexulti.  pt reports that his rexulti was increased in may 2022.      Asthma controlled.      Morbid obesity monitored      isabel cpap controlled.      htn stable.            Review of Systems      Review of Systems   Constitutional: Negative for fever and chills.   HENT: Negative for hearing loss and neck stiffness.    Eyes: Negative for redness and itching.   Respiratory: Negative for cough and choking.    Cardiovascular: Negative for chest pain and leg swelling.  Abdomen: Negative for abdominal pain and blood in stool.   Genitourinary: Negative for dysuria and flank pain.   Musculoskeletal: Negative for back pain and gait problem.   Neurological: Negative for light-headedness and headaches.   Hematological: Negative for adenopathy.       Objective:      Physical Exam  HENT:      Head: Atraumatic.   Eyes:      Conjunctiva/sclera: Conjunctivae normal.      Pupils: Pupils are equal, round, and reactive to light.   Cardiovascular:      Rate and Rhythm: Normal rate and regular rhythm.      Heart sounds: No murmur heard.  Pulmonary:      Effort: Pulmonary effort is normal.      Breath sounds: Normal breath sounds. No wheezing.   Musculoskeletal:      Cervical back: Normal range of motion.   Lymphadenopathy:      Cervical: No cervical adenopathy.         Assessment:       1. Essential hypertension    2. Recurrent major depressive disorder, remission status unspecified    3. Morbid obesity    4. Mild intermittent asthma without complication     5. JOHN on CPAP        Plan:       Essential hypertension  -     carvediloL (COREG) 25 MG tablet; Take 1 tablet (25 mg total) by mouth 2 (two) times daily with meals.  Dispense: 180 tablet; Refill: 3    Recurrent major depressive disorder, remission status unspecified    Morbid obesity    Mild intermittent asthma without complication    JOHN on CPAP              Plan:  Cont current meds      Medication List with Changes/Refills   Current Medications    ALBUTEROL (VENTOLIN HFA) 90 MCG/ACTUATION INHALER    Inhale 2 puffs into the lungs every 6 (six) hours as needed for Wheezing.    AMLODIPINE (NORVASC) 10 MG TABLET    Take 1 tablet (10 mg total) by mouth once daily.    AZELASTINE (ASTELIN) 137 MCG (0.1 %) NASAL SPRAY    1 spray (137 mcg total) by Nasal route 2 (two) times daily.    BREXPIPRAZOLE (REXULTI) 1 MG TAB    Take 1 mg by mouth Daily.    CETIRIZINE-PSEUDOEPHEDRINE 5-120 MG TB12    Take 5 mg by mouth 2 (two) times daily.    CHOLECALCIFEROL, VITAMIN D3, 125 MCG (5,000 UNIT) TAB    Take 5,000 Units by mouth once daily.    ESOMEPRAZOLE (NEXIUM) 20 MG CAPSULE    Take 20 mg by mouth once daily.    FLUOXETINE 20 MG CAPSULE    Take 3 capsules (60 mg total) by mouth once daily.    FLUTICASONE-SALMETEROL DISKUS INHALER 250-50 MCG    USE 1 INHALATION ONCE DAILY AS DIRECTED (CONTROLLER).  EXPIRES 30 DAYS AFTER FIRST USE    GLUCOSAMINE-CHONDROITIN 500-400 MG TABLET    Take 2 tablets by mouth Daily.    IBUPROFEN (ADVIL,MOTRIN) 200 MG TABLET    Take 200 mg by mouth every 8 (eight) hours as needed. 4 tab twice daily    INDAPAMIDE (LOZOL) 2.5 MG TAB    Take 1 tablet (2.5 mg total) by mouth once daily.    KETOCONAZOLE (NIZORAL) 2 % CREAM    APPLY A THIN FILM TO AFFECTED AREA OF GROIN DAILY AS NEEDED FOR FLARE    KETOCONAZOLE (NIZORAL) 2 % SHAMPOO    Wash hair with medicated shampoo at least 2x/week - let sit on scalp at least 5 minutes prior to rinsing    LACTOBAC 42-BIFID 9-[id number] -50 MG PWPK    Take 1 Package by  mouth.    LOSARTAN (COZAAR) 100 MG TABLET    TAKE 1 TABLET DAILY    MONTELUKAST (SINGULAIR) 10 MG TABLET    Take 1 tablet (10 mg total) by mouth every evening.    MULTIVITAMIN (THERAGRAN) PER TABLET    Take 1 tablet by mouth once daily.    NALTREXONE-BUPROPION (CONTRAVE) 8-90 MG TBSR    Take two tablets by mouth twice a day    REXULTI 2 MG TAB    Take 1 tablet by mouth once daily.    SOLIFENACIN (VESICARE) 10 MG TABLET    Take 1 tablet (10 mg total) by mouth once daily.    TRIAMCINOLONE ACETONIDE 0.1% (KENALOG) 0.1 % CREAM    Apply topically 2 (two) times daily. X 2 wks then off 2 wks. Repeat as needed   Changed and/or Refilled Medications    Modified Medication Previous Medication    CARVEDILOL (COREG) 25 MG TABLET carvediloL (COREG) 25 MG tablet       Take 1 tablet (25 mg total) by mouth 2 (two) times daily with meals.    Take 1 tablet (25 mg total) by mouth 2 (two) times daily with meals.

## 2022-07-29 ENCOUNTER — LAB VISIT (OUTPATIENT)
Dept: LAB | Facility: HOSPITAL | Age: 42
End: 2022-07-29
Attending: FAMILY MEDICINE
Payer: COMMERCIAL

## 2022-07-29 DIAGNOSIS — I10 ESSENTIAL HYPERTENSION: ICD-10-CM

## 2022-07-29 LAB
ALBUMIN SERPL BCP-MCNC: 3.6 G/DL (ref 3.5–5.2)
ALP SERPL-CCNC: 109 U/L (ref 55–135)
ALT SERPL W/O P-5'-P-CCNC: 27 U/L (ref 10–44)
ANION GAP SERPL CALC-SCNC: 12 MMOL/L (ref 8–16)
AST SERPL-CCNC: 21 U/L (ref 10–40)
BILIRUB SERPL-MCNC: 0.5 MG/DL (ref 0.1–1)
BUN SERPL-MCNC: 11 MG/DL (ref 6–20)
CALCIUM SERPL-MCNC: 9.4 MG/DL (ref 8.7–10.5)
CHLORIDE SERPL-SCNC: 100 MMOL/L (ref 95–110)
CHOLEST SERPL-MCNC: 182 MG/DL (ref 120–199)
CHOLEST/HDLC SERPL: 5.4 {RATIO} (ref 2–5)
CO2 SERPL-SCNC: 30 MMOL/L (ref 23–29)
CREAT SERPL-MCNC: 1.1 MG/DL (ref 0.5–1.4)
EST. GFR  (AFRICAN AMERICAN): >60 ML/MIN/1.73 M^2
EST. GFR  (NON AFRICAN AMERICAN): >60 ML/MIN/1.73 M^2
ESTIMATED AVG GLUCOSE: 103 MG/DL (ref 68–131)
GLUCOSE SERPL-MCNC: 90 MG/DL (ref 70–110)
HBA1C MFR BLD: 5.2 % (ref 4–5.6)
HDLC SERPL-MCNC: 34 MG/DL (ref 40–75)
HDLC SERPL: 18.7 % (ref 20–50)
LDLC SERPL CALC-MCNC: 115.4 MG/DL (ref 63–159)
NONHDLC SERPL-MCNC: 148 MG/DL
POTASSIUM SERPL-SCNC: 3.2 MMOL/L (ref 3.5–5.1)
PROT SERPL-MCNC: 6.9 G/DL (ref 6–8.4)
SODIUM SERPL-SCNC: 142 MMOL/L (ref 136–145)
TRIGL SERPL-MCNC: 163 MG/DL (ref 30–150)

## 2022-07-29 PROCEDURE — 83036 HEMOGLOBIN GLYCOSYLATED A1C: CPT | Performed by: FAMILY MEDICINE

## 2022-07-29 PROCEDURE — 80053 COMPREHEN METABOLIC PANEL: CPT | Performed by: FAMILY MEDICINE

## 2022-07-29 PROCEDURE — 36415 COLL VENOUS BLD VENIPUNCTURE: CPT | Mod: PO | Performed by: FAMILY MEDICINE

## 2022-07-29 PROCEDURE — 80061 LIPID PANEL: CPT | Performed by: FAMILY MEDICINE

## 2022-08-10 ENCOUNTER — TELEPHONE (OUTPATIENT)
Dept: FAMILY MEDICINE | Facility: CLINIC | Age: 42
End: 2022-08-10
Payer: COMMERCIAL

## 2022-08-10 ENCOUNTER — PATIENT MESSAGE (OUTPATIENT)
Dept: FAMILY MEDICINE | Facility: CLINIC | Age: 42
End: 2022-08-10
Payer: COMMERCIAL

## 2022-08-10 DIAGNOSIS — E87.6 HYPOKALEMIA: Primary | ICD-10-CM

## 2022-08-10 NOTE — TELEPHONE ENCOUNTER
inform pt via phone that I reviewed the test results and note the following:    Pertinent results:    Mildly low potassium noted.    Needs to come in for repeat bmp this week.

## 2022-10-20 ENCOUNTER — OFFICE VISIT (OUTPATIENT)
Dept: FAMILY MEDICINE | Facility: CLINIC | Age: 42
End: 2022-10-20
Payer: COMMERCIAL

## 2022-10-20 ENCOUNTER — LAB VISIT (OUTPATIENT)
Dept: LAB | Facility: HOSPITAL | Age: 42
End: 2022-10-20
Attending: FAMILY MEDICINE
Payer: COMMERCIAL

## 2022-10-20 VITALS
DIASTOLIC BLOOD PRESSURE: 82 MMHG | SYSTOLIC BLOOD PRESSURE: 132 MMHG | HEIGHT: 72 IN | OXYGEN SATURATION: 97 % | WEIGHT: 315 LBS | HEART RATE: 93 BPM | BODY MASS INDEX: 42.66 KG/M2 | RESPIRATION RATE: 18 BRPM | TEMPERATURE: 98 F

## 2022-10-20 DIAGNOSIS — I10 ESSENTIAL HYPERTENSION: Primary | ICD-10-CM

## 2022-10-20 DIAGNOSIS — E66.01 MORBID OBESITY: ICD-10-CM

## 2022-10-20 DIAGNOSIS — E87.6 HYPOKALEMIA: ICD-10-CM

## 2022-10-20 DIAGNOSIS — R35.0 URINARY FREQUENCY: ICD-10-CM

## 2022-10-20 DIAGNOSIS — R35.1 NOCTURIA: ICD-10-CM

## 2022-10-20 DIAGNOSIS — J45.20 MILD INTERMITTENT ASTHMA WITHOUT COMPLICATION: ICD-10-CM

## 2022-10-20 DIAGNOSIS — F33.9 RECURRENT MAJOR DEPRESSIVE DISORDER, REMISSION STATUS UNSPECIFIED: ICD-10-CM

## 2022-10-20 DIAGNOSIS — N39.41 URGE INCONTINENCE: ICD-10-CM

## 2022-10-20 LAB
ANION GAP SERPL CALC-SCNC: 9 MMOL/L (ref 8–16)
BUN SERPL-MCNC: 11 MG/DL (ref 6–20)
CALCIUM SERPL-MCNC: 9.4 MG/DL (ref 8.7–10.5)
CHLORIDE SERPL-SCNC: 99 MMOL/L (ref 95–110)
CO2 SERPL-SCNC: 30 MMOL/L (ref 23–29)
CREAT SERPL-MCNC: 1.1 MG/DL (ref 0.5–1.4)
EST. GFR  (NO RACE VARIABLE): >60 ML/MIN/1.73 M^2
GLUCOSE SERPL-MCNC: 75 MG/DL (ref 70–110)
POTASSIUM SERPL-SCNC: 3.4 MMOL/L (ref 3.5–5.1)
SODIUM SERPL-SCNC: 138 MMOL/L (ref 136–145)

## 2022-10-20 PROCEDURE — 99999 PR PBB SHADOW E&M-EST. PATIENT-LVL IV: CPT | Mod: PBBFAC,,, | Performed by: FAMILY MEDICINE

## 2022-10-20 PROCEDURE — 90686 FLU VACCINE (QUAD) GREATER THAN OR EQUAL TO 3YO PRESERVATIVE FREE IM: ICD-10-PCS | Mod: S$GLB,,, | Performed by: FAMILY MEDICINE

## 2022-10-20 PROCEDURE — 99214 PR OFFICE/OUTPT VISIT, EST, LEVL IV, 30-39 MIN: ICD-10-PCS | Mod: 25,S$GLB,, | Performed by: FAMILY MEDICINE

## 2022-10-20 PROCEDURE — 3079F PR MOST RECENT DIASTOLIC BLOOD PRESSURE 80-89 MM HG: ICD-10-PCS | Mod: CPTII,S$GLB,, | Performed by: FAMILY MEDICINE

## 2022-10-20 PROCEDURE — 1160F RVW MEDS BY RX/DR IN RCRD: CPT | Mod: CPTII,S$GLB,, | Performed by: FAMILY MEDICINE

## 2022-10-20 PROCEDURE — 4010F PR ACE/ARB THEARPY RXD/TAKEN: ICD-10-PCS | Mod: CPTII,S$GLB,, | Performed by: FAMILY MEDICINE

## 2022-10-20 PROCEDURE — 1159F PR MEDICATION LIST DOCUMENTED IN MEDICAL RECORD: ICD-10-PCS | Mod: CPTII,S$GLB,, | Performed by: FAMILY MEDICINE

## 2022-10-20 PROCEDURE — 90471 IMMUNIZATION ADMIN: CPT | Mod: S$GLB,,, | Performed by: FAMILY MEDICINE

## 2022-10-20 PROCEDURE — 99999 PR PBB SHADOW E&M-EST. PATIENT-LVL IV: ICD-10-PCS | Mod: PBBFAC,,, | Performed by: FAMILY MEDICINE

## 2022-10-20 PROCEDURE — 3044F HG A1C LEVEL LT 7.0%: CPT | Mod: CPTII,S$GLB,, | Performed by: FAMILY MEDICINE

## 2022-10-20 PROCEDURE — 3079F DIAST BP 80-89 MM HG: CPT | Mod: CPTII,S$GLB,, | Performed by: FAMILY MEDICINE

## 2022-10-20 PROCEDURE — 90686 IIV4 VACC NO PRSV 0.5 ML IM: CPT | Mod: S$GLB,,, | Performed by: FAMILY MEDICINE

## 2022-10-20 PROCEDURE — 3044F PR MOST RECENT HEMOGLOBIN A1C LEVEL <7.0%: ICD-10-PCS | Mod: CPTII,S$GLB,, | Performed by: FAMILY MEDICINE

## 2022-10-20 PROCEDURE — 80048 BASIC METABOLIC PNL TOTAL CA: CPT | Performed by: FAMILY MEDICINE

## 2022-10-20 PROCEDURE — 99214 OFFICE O/P EST MOD 30 MIN: CPT | Mod: 25,S$GLB,, | Performed by: FAMILY MEDICINE

## 2022-10-20 PROCEDURE — 36415 COLL VENOUS BLD VENIPUNCTURE: CPT | Mod: PO | Performed by: FAMILY MEDICINE

## 2022-10-20 PROCEDURE — 4010F ACE/ARB THERAPY RXD/TAKEN: CPT | Mod: CPTII,S$GLB,, | Performed by: FAMILY MEDICINE

## 2022-10-20 PROCEDURE — 1159F MED LIST DOCD IN RCRD: CPT | Mod: CPTII,S$GLB,, | Performed by: FAMILY MEDICINE

## 2022-10-20 PROCEDURE — 3075F SYST BP GE 130 - 139MM HG: CPT | Mod: CPTII,S$GLB,, | Performed by: FAMILY MEDICINE

## 2022-10-20 PROCEDURE — 1160F PR REVIEW ALL MEDS BY PRESCRIBER/CLIN PHARMACIST DOCUMENTED: ICD-10-PCS | Mod: CPTII,S$GLB,, | Performed by: FAMILY MEDICINE

## 2022-10-20 PROCEDURE — 90471 FLU VACCINE (QUAD) GREATER THAN OR EQUAL TO 3YO PRESERVATIVE FREE IM: ICD-10-PCS | Mod: S$GLB,,, | Performed by: FAMILY MEDICINE

## 2022-10-20 PROCEDURE — 3075F PR MOST RECENT SYSTOLIC BLOOD PRESS GE 130-139MM HG: ICD-10-PCS | Mod: CPTII,S$GLB,, | Performed by: FAMILY MEDICINE

## 2022-10-20 RX ORDER — SOLIFENACIN SUCCINATE 10 MG/1
10 TABLET, FILM COATED ORAL DAILY
Qty: 90 TABLET | Refills: 3 | Status: SHIPPED | OUTPATIENT
Start: 2022-10-20 | End: 2023-12-17 | Stop reason: SDUPTHER

## 2022-10-20 RX ORDER — BREXPIPRAZOLE 3 MG/1
3 TABLET ORAL DAILY
Qty: 90 TABLET | Refills: 3
Start: 2022-10-20 | End: 2023-10-11

## 2022-10-20 RX ORDER — MONTELUKAST SODIUM 10 MG/1
10 TABLET ORAL NIGHTLY
Qty: 90 TABLET | Refills: 3 | Status: SHIPPED | OUTPATIENT
Start: 2022-10-20 | End: 2023-12-17 | Stop reason: SDUPTHER

## 2022-10-20 NOTE — PROGRESS NOTES
Subjective:       Patient ID: Greg Garcia is a 42 y.o. male.    Chief Complaint: Follow-up (3 month follow up )    HPI    Here for a f/u     Has started on contrave in February 2022. Starting weight: 457 lbs. Today: weight is 429 lbs.      Seeing Dr. Olivsa, medical psychologist, for anxiety/depression management.  Also, see Dr. Blanco, psychotherapist, for counseling. On prozac and Rexulti.  pt reports that his rexulti was increased in may 2022.      Asthma controlled.      Morbid obesity monitored      isabel cpap controlled.      htn stable.            Review of Systems      Review of Systems   Constitutional: Negative for fever and chills.   HENT: Negative for hearing loss and neck stiffness.    Eyes: Negative for redness and itching.   Respiratory: Negative for cough and choking.    Cardiovascular: Negative for chest pain and leg swelling.  Abdomen: Negative for abdominal pain and blood in stool.   Genitourinary: Negative for dysuria and flank pain.   Musculoskeletal: Negative for back pain and gait problem.   Neurological: Negative for light-headedness and headaches.   Hematological: Negative for adenopathy.   Psychiatric/Behavioral: Negative for behavioral problems.     Objective:      Physical Exam  HENT:      Head: Atraumatic.   Eyes:      Conjunctiva/sclera: Conjunctivae normal.      Pupils: Pupils are equal, round, and reactive to light.   Cardiovascular:      Rate and Rhythm: Normal rate and regular rhythm.      Heart sounds: No murmur heard.  Pulmonary:      Effort: Pulmonary effort is normal.      Breath sounds: Normal breath sounds. No wheezing.   Musculoskeletal:      Cervical back: Normal range of motion.   Lymphadenopathy:      Cervical: No cervical adenopathy.       Assessment:       1. Essential hypertension    2. Recurrent major depressive disorder, remission status unspecified    3. Morbid obesity    4. Mild intermittent asthma without complication    5. Urinary frequency    6. Urge  incontinence    7. Nocturia        Plan:       Essential hypertension    Recurrent major depressive disorder, remission status unspecified    Morbid obesity    Mild intermittent asthma without complication    Urinary frequency  -     solifenacin (VESICARE) 10 MG tablet; Take 1 tablet (10 mg total) by mouth once daily.  Dispense: 90 tablet; Refill: 3    Urge incontinence  -     solifenacin (VESICARE) 10 MG tablet; Take 1 tablet (10 mg total) by mouth once daily.  Dispense: 90 tablet; Refill: 3    Nocturia  -     solifenacin (VESICARE) 10 MG tablet; Take 1 tablet (10 mg total) by mouth once daily.  Dispense: 90 tablet; Refill: 3    Other orders  -     montelukast (SINGULAIR) 10 mg tablet; Take 1 tablet (10 mg total) by mouth every evening.  Dispense: 90 tablet; Refill: 3  -     brexpiprazole (REXULTI) 3 mg Tab; Take 3 mg by mouth once daily.  Dispense: 90 tablet; Refill: 3  -     Influenza - Quadrivalent (PF)                  Plan:  Cont current meds  F/u in 6 months      Medication List with Changes/Refills   New Medications    BREXPIPRAZOLE (REXULTI) 3 MG TAB    Take 3 mg by mouth once daily.   Current Medications    ALBUTEROL (VENTOLIN HFA) 90 MCG/ACTUATION INHALER    Inhale 2 puffs into the lungs every 6 (six) hours as needed for Wheezing.    AMLODIPINE (NORVASC) 10 MG TABLET    Take 1 tablet (10 mg total) by mouth once daily.    AZELASTINE (ASTELIN) 137 MCG (0.1 %) NASAL SPRAY    1 spray (137 mcg total) by Nasal route 2 (two) times daily.    CARVEDILOL (COREG) 25 MG TABLET    Take 1 tablet (25 mg total) by mouth 2 (two) times daily with meals.    CETIRIZINE-PSEUDOEPHEDRINE 5-120 MG TB12    Take 5 mg by mouth 2 (two) times daily.    CHOLECALCIFEROL, VITAMIN D3, 125 MCG (5,000 UNIT) TAB    Take 5,000 Units by mouth once daily.    CONTRAVE 8-90 MG TBSR    Take 2 tablets by mouth 2 (two) times daily.    ESOMEPRAZOLE (NEXIUM) 20 MG CAPSULE    Take 20 mg by mouth once daily.    FLUOXETINE 20 MG CAPSULE    Take 3  capsules (60 mg total) by mouth once daily.    FLUTICASONE-SALMETEROL DISKUS INHALER 250-50 MCG    USE 1 INHALATION ONCE DAILY AS DIRECTED (CONTROLLER).  EXPIRES 30 DAYS AFTER FIRST USE    GLUCOSAMINE-CHONDROITIN 500-400 MG TABLET    Take 2 tablets by mouth Daily.    IBUPROFEN (ADVIL,MOTRIN) 200 MG TABLET    Take 200 mg by mouth every 8 (eight) hours as needed. 4 tab twice daily    INDAPAMIDE (LOZOL) 2.5 MG TAB    Take 1 tablet (2.5 mg total) by mouth once daily.    KETOCONAZOLE (NIZORAL) 2 % CREAM    APPLY A THIN FILM TO AFFECTED AREA OF GROIN DAILY AS NEEDED FOR FLARE    KETOCONAZOLE (NIZORAL) 2 % SHAMPOO    Wash hair with medicated shampoo at least 2x/week - let sit on scalp at least 5 minutes prior to rinsing    LACTOBAC 42-BIFID 1-[id number] -50 MG PWPK    Take 1 Package by mouth.    LOSARTAN (COZAAR) 100 MG TABLET    TAKE 1 TABLET DAILY    MULTIVITAMIN (THERAGRAN) PER TABLET    Take 1 tablet by mouth once daily.    TRIAMCINOLONE ACETONIDE 0.1% (KENALOG) 0.1 % CREAM    Apply topically 2 (two) times daily. X 2 wks then off 2 wks. Repeat as needed   Changed and/or Refilled Medications    Modified Medication Previous Medication    MONTELUKAST (SINGULAIR) 10 MG TABLET montelukast (SINGULAIR) 10 mg tablet       Take 1 tablet (10 mg total) by mouth every evening.    Take 1 tablet (10 mg total) by mouth every evening.    SOLIFENACIN (VESICARE) 10 MG TABLET solifenacin (VESICARE) 10 MG tablet       Take 1 tablet (10 mg total) by mouth once daily.    Take 1 tablet (10 mg total) by mouth once daily.   Discontinued Medications    BREXPIPRAZOLE (REXULTI) 1 MG TAB    Take 1 mg by mouth Daily.    REXULTI 2 MG TAB    Take 1 tablet by mouth once daily. 3 MG

## 2022-11-01 NOTE — TELEPHONE ENCOUNTER
Please see pt message below. Please advise.   Brow Lift Text: A midfrontal incision was made medially to the defect to allow access to the tissues just superior to the left eyebrow. Following careful dissection inferiorly in a supraperiosteal plane to the level of the left eyebrow, several 3-0 monocryl sutures were used to resuspend the eyebrow orbicularis oculi muscular unit to the superior frontal bone periosteum. This resulted in an appropriate reapproximation of static eyebrow symmetry and correction of the left brow ptosis.

## 2023-03-29 ENCOUNTER — PATIENT MESSAGE (OUTPATIENT)
Dept: FAMILY MEDICINE | Facility: CLINIC | Age: 43
End: 2023-03-29
Payer: COMMERCIAL

## 2023-05-15 ENCOUNTER — LAB VISIT (OUTPATIENT)
Dept: LAB | Facility: HOSPITAL | Age: 43
End: 2023-05-15
Attending: FAMILY MEDICINE
Payer: COMMERCIAL

## 2023-05-15 ENCOUNTER — OFFICE VISIT (OUTPATIENT)
Dept: FAMILY MEDICINE | Facility: CLINIC | Age: 43
End: 2023-05-15
Payer: COMMERCIAL

## 2023-05-15 VITALS
RESPIRATION RATE: 18 BRPM | OXYGEN SATURATION: 97 % | HEART RATE: 67 BPM | SYSTOLIC BLOOD PRESSURE: 130 MMHG | TEMPERATURE: 99 F | DIASTOLIC BLOOD PRESSURE: 80 MMHG | WEIGHT: 315 LBS | HEIGHT: 72 IN | BODY MASS INDEX: 42.66 KG/M2

## 2023-05-15 DIAGNOSIS — R53.83 FATIGUE, UNSPECIFIED TYPE: Primary | ICD-10-CM

## 2023-05-15 DIAGNOSIS — F33.9 RECURRENT MAJOR DEPRESSIVE DISORDER, REMISSION STATUS UNSPECIFIED: ICD-10-CM

## 2023-05-15 DIAGNOSIS — R53.83 FATIGUE, UNSPECIFIED TYPE: ICD-10-CM

## 2023-05-15 DIAGNOSIS — E66.01 MORBID OBESITY: ICD-10-CM

## 2023-05-15 DIAGNOSIS — G47.33 OSA ON CPAP: ICD-10-CM

## 2023-05-15 DIAGNOSIS — J45.20 MILD INTERMITTENT ASTHMA WITHOUT COMPLICATION: ICD-10-CM

## 2023-05-15 DIAGNOSIS — I10 ESSENTIAL HYPERTENSION: ICD-10-CM

## 2023-05-15 LAB
ALBUMIN SERPL BCP-MCNC: 3.9 G/DL (ref 3.5–5.2)
ALP SERPL-CCNC: 100 U/L (ref 55–135)
ALT SERPL W/O P-5'-P-CCNC: 25 U/L (ref 10–44)
ANION GAP SERPL CALC-SCNC: 14 MMOL/L (ref 8–16)
AST SERPL-CCNC: 20 U/L (ref 10–40)
BASOPHILS # BLD AUTO: 0.12 K/UL (ref 0–0.2)
BASOPHILS NFR BLD: 1.3 % (ref 0–1.9)
BILIRUB SERPL-MCNC: 0.5 MG/DL (ref 0.1–1)
BUN SERPL-MCNC: 15 MG/DL (ref 6–20)
CALCIUM SERPL-MCNC: 10.1 MG/DL (ref 8.7–10.5)
CHLORIDE SERPL-SCNC: 99 MMOL/L (ref 95–110)
CO2 SERPL-SCNC: 30 MMOL/L (ref 23–29)
CREAT SERPL-MCNC: 1.1 MG/DL (ref 0.5–1.4)
DIFFERENTIAL METHOD: ABNORMAL
EOSINOPHIL # BLD AUTO: 0.3 K/UL (ref 0–0.5)
EOSINOPHIL NFR BLD: 3.1 % (ref 0–8)
ERYTHROCYTE [DISTWIDTH] IN BLOOD BY AUTOMATED COUNT: 12.8 % (ref 11.5–14.5)
EST. GFR  (NO RACE VARIABLE): >60 ML/MIN/1.73 M^2
GLUCOSE SERPL-MCNC: 110 MG/DL (ref 70–110)
HCT VFR BLD AUTO: 45.9 % (ref 40–54)
HGB BLD-MCNC: 15.4 G/DL (ref 14–18)
IMM GRANULOCYTES # BLD AUTO: 0.03 K/UL (ref 0–0.04)
IMM GRANULOCYTES NFR BLD AUTO: 0.3 % (ref 0–0.5)
LYMPHOCYTES # BLD AUTO: 2.2 K/UL (ref 1–4.8)
LYMPHOCYTES NFR BLD: 25.2 % (ref 18–48)
MCH RBC QN AUTO: 30.8 PG (ref 27–31)
MCHC RBC AUTO-ENTMCNC: 33.6 G/DL (ref 32–36)
MCV RBC AUTO: 92 FL (ref 82–98)
MONOCYTES # BLD AUTO: 0.8 K/UL (ref 0.3–1)
MONOCYTES NFR BLD: 9.3 % (ref 4–15)
NEUTROPHILS # BLD AUTO: 5.4 K/UL (ref 1.8–7.7)
NEUTROPHILS NFR BLD: 60.8 % (ref 38–73)
NRBC BLD-RTO: 0 /100 WBC
PLATELET # BLD AUTO: 286 K/UL (ref 150–450)
PMV BLD AUTO: 13.1 FL (ref 9.2–12.9)
POTASSIUM SERPL-SCNC: 3.2 MMOL/L (ref 3.5–5.1)
PROT SERPL-MCNC: 7.2 G/DL (ref 6–8.4)
RBC # BLD AUTO: 5 M/UL (ref 4.6–6.2)
SODIUM SERPL-SCNC: 143 MMOL/L (ref 136–145)
TSH SERPL DL<=0.005 MIU/L-ACNC: 2.59 UIU/ML (ref 0.4–4)
WBC # BLD AUTO: 8.89 K/UL (ref 3.9–12.7)

## 2023-05-15 PROCEDURE — 80053 COMPREHEN METABOLIC PANEL: CPT | Performed by: FAMILY MEDICINE

## 2023-05-15 PROCEDURE — 1159F MED LIST DOCD IN RCRD: CPT | Mod: CPTII,S$GLB,, | Performed by: FAMILY MEDICINE

## 2023-05-15 PROCEDURE — 85025 COMPLETE CBC W/AUTO DIFF WBC: CPT | Performed by: FAMILY MEDICINE

## 2023-05-15 PROCEDURE — 36415 COLL VENOUS BLD VENIPUNCTURE: CPT | Mod: PO | Performed by: FAMILY MEDICINE

## 2023-05-15 PROCEDURE — 99999 PR PBB SHADOW E&M-EST. PATIENT-LVL V: CPT | Mod: PBBFAC,,, | Performed by: FAMILY MEDICINE

## 2023-05-15 PROCEDURE — 3079F PR MOST RECENT DIASTOLIC BLOOD PRESSURE 80-89 MM HG: ICD-10-PCS | Mod: CPTII,S$GLB,, | Performed by: FAMILY MEDICINE

## 2023-05-15 PROCEDURE — 3008F PR BODY MASS INDEX (BMI) DOCUMENTED: ICD-10-PCS | Mod: CPTII,S$GLB,, | Performed by: FAMILY MEDICINE

## 2023-05-15 PROCEDURE — 3075F SYST BP GE 130 - 139MM HG: CPT | Mod: CPTII,S$GLB,, | Performed by: FAMILY MEDICINE

## 2023-05-15 PROCEDURE — 3008F BODY MASS INDEX DOCD: CPT | Mod: CPTII,S$GLB,, | Performed by: FAMILY MEDICINE

## 2023-05-15 PROCEDURE — 99214 PR OFFICE/OUTPT VISIT, EST, LEVL IV, 30-39 MIN: ICD-10-PCS | Mod: S$GLB,,, | Performed by: FAMILY MEDICINE

## 2023-05-15 PROCEDURE — 99214 OFFICE O/P EST MOD 30 MIN: CPT | Mod: S$GLB,,, | Performed by: FAMILY MEDICINE

## 2023-05-15 PROCEDURE — 4010F ACE/ARB THERAPY RXD/TAKEN: CPT | Mod: CPTII,S$GLB,, | Performed by: FAMILY MEDICINE

## 2023-05-15 PROCEDURE — 1159F PR MEDICATION LIST DOCUMENTED IN MEDICAL RECORD: ICD-10-PCS | Mod: CPTII,S$GLB,, | Performed by: FAMILY MEDICINE

## 2023-05-15 PROCEDURE — 3079F DIAST BP 80-89 MM HG: CPT | Mod: CPTII,S$GLB,, | Performed by: FAMILY MEDICINE

## 2023-05-15 PROCEDURE — 84443 ASSAY THYROID STIM HORMONE: CPT | Performed by: FAMILY MEDICINE

## 2023-05-15 PROCEDURE — 4010F PR ACE/ARB THEARPY RXD/TAKEN: ICD-10-PCS | Mod: CPTII,S$GLB,, | Performed by: FAMILY MEDICINE

## 2023-05-15 PROCEDURE — 3075F PR MOST RECENT SYSTOLIC BLOOD PRESS GE 130-139MM HG: ICD-10-PCS | Mod: CPTII,S$GLB,, | Performed by: FAMILY MEDICINE

## 2023-05-15 PROCEDURE — 99999 PR PBB SHADOW E&M-EST. PATIENT-LVL V: ICD-10-PCS | Mod: PBBFAC,,, | Performed by: FAMILY MEDICINE

## 2023-05-15 RX ORDER — CARIPRAZINE 3 MG/1
3 CAPSULE, GELATIN COATED ORAL EVERY MORNING
COMMUNITY
Start: 2023-04-14

## 2023-05-15 NOTE — PROGRESS NOTES
Subjective:       Patient ID: Greg Garcia is a 43 y.o. male.    Chief Complaint: Follow-up (Fatigued, more so then usual. Felt like over medicated. Is expensing depression and anxiety. )    HPI    Here for a f/u    C/o generalized fatigue over the past 6 months    Has started on contrave in February 2022. Starting weight: 457 lbs. Today: weight is 420 lbs.      Seeing Dr. Olivas, medical psychologist, for anxiety/depression management.  Also, see Dr. Blanco, psychotherapist, for counseling. On prozac and vralar.  Started vraylar in 3/2023.      Asthma controlled.      Morbid obesity monitored      Matt-on cpap. Per patient, on highest pressure reading. Will f/u with Dr. Rod, sleep study dr, to see if he can make any adjustments.      htn stable.            Objective:      Physical Exam  HENT:      Head: Atraumatic.   Eyes:      Conjunctiva/sclera: Conjunctivae normal.      Pupils: Pupils are equal, round, and reactive to light.   Cardiovascular:      Rate and Rhythm: Normal rate and regular rhythm.      Heart sounds: No murmur heard.  Pulmonary:      Effort: Pulmonary effort is normal.      Breath sounds: Normal breath sounds. No wheezing.   Musculoskeletal:      Cervical back: Normal range of motion.   Lymphadenopathy:      Cervical: No cervical adenopathy.       Assessment:       1. Fatigue, unspecified type    2. Morbid obesity    3. Mild intermittent asthma without complication    4. Essential hypertension    5. Recurrent major depressive disorder, remission status unspecified    6. MATT on CPAP        Plan:       Fatigue, unspecified type  -     Comprehensive Metabolic Panel; Future  -     CBC Auto Differential; Future  -     TSH; Future    Morbid obesity    Mild intermittent asthma without complication    Essential hypertension    Recurrent major depressive disorder, remission status unspecified    MATT on CPAP              Plan:  Labs today  Cont current meds  F/u in 6 months      Medication List  with Changes/Refills   Current Medications    ALBUTEROL (VENTOLIN HFA) 90 MCG/ACTUATION INHALER    Inhale 2 puffs into the lungs every 6 (six) hours as needed for Wheezing.    AMLODIPINE (NORVASC) 10 MG TABLET    Take 1 tablet (10 mg total) by mouth once daily.    AZELASTINE (ASTELIN) 137 MCG (0.1 %) NASAL SPRAY    1 spray (137 mcg total) by Nasal route 2 (two) times daily.    BREXPIPRAZOLE (REXULTI) 3 MG TAB    Take 3 mg by mouth once daily.    CARVEDILOL (COREG) 25 MG TABLET    Take 1 tablet (25 mg total) by mouth 2 (two) times daily with meals.    CETIRIZINE-PSEUDOEPHEDRINE 5-120 MG TB12    Take 5 mg by mouth 2 (two) times daily.    CHOLECALCIFEROL, VITAMIN D3, 125 MCG (5,000 UNIT) TAB    Take 5,000 Units by mouth once daily.    ESOMEPRAZOLE (NEXIUM) 20 MG CAPSULE    Take 20 mg by mouth once daily.    FLUOXETINE 20 MG CAPSULE    Take 3 capsules (60 mg total) by mouth once daily.    FLUTICASONE-SALMETEROL DISKUS INHALER 250-50 MCG    USE 1 INHALATION ONCE DAILY AS DIRECTED (CONTROLLER).  EXPIRES 30 DAYS AFTER FIRST USE    GLUCOSAMINE-CHONDROITIN 500-400 MG TABLET    Take 2 tablets by mouth Daily.    IBUPROFEN (ADVIL,MOTRIN) 200 MG TABLET    Take 200 mg by mouth every 8 (eight) hours as needed. 4 tab twice daily    INDAPAMIDE (LOZOL) 2.5 MG TAB    TAKE 1 TABLET DAILY    KETOCONAZOLE (NIZORAL) 2 % CREAM    APPLY A THIN FILM TO AFFECTED AREA OF GROIN DAILY AS NEEDED FOR FLARE    KETOCONAZOLE (NIZORAL) 2 % SHAMPOO    Wash hair with medicated shampoo at least 2x/week - let sit on scalp at least 5 minutes prior to rinsing    LACTOBAC 42-BIFID 6-[id number] -50 MG PWPK    Take 1 Package by mouth.    LOSARTAN (COZAAR) 100 MG TABLET    TAKE 1 TABLET DAILY    MONTELUKAST (SINGULAIR) 10 MG TABLET    Take 1 tablet (10 mg total) by mouth every evening.    MULTIVITAMIN (THERAGRAN) PER TABLET    Take 1 tablet by mouth once daily.    NALTREXONE-BUPROPION (CONTRAVE) 8-90 MG TBSR    Take 2 tablets by mouth 2 (two) times daily.     SOLIFENACIN (VESICARE) 10 MG TABLET    Take 1 tablet (10 mg total) by mouth once daily.    TRIAMCINOLONE ACETONIDE 0.1% (KENALOG) 0.1 % CREAM    Apply topically 2 (two) times daily. X 2 wks then off 2 wks. Repeat as needed    VRAYLAR 3 MG CAP    Take 3 mg by mouth every morning.

## 2023-05-15 NOTE — MEDICAL/APP STUDENT
Subjective     Patient ID: Greg Garcia is a 43 y.o. male.    Chief Complaint: Follow-up (Fatigued, more so then usual. Felt like over medicated. Is expensing depression and anxiety. )    HPI  43 year old male with a history of HTN, asthma, JOHN, anxiety/depression obesity here for a 6 month follow up. The patient states that he has been feeling fatigued and reports symptoms of low energy. He is following with a medical psychologist who manages his current regimen of prozac and vraylar. He reports continued dysphoria that is improving but he is concerned that his blood pressure regimen consisting of amlodipine, carvedilol, indapamide, and loartan are worsening his fatigue. He has lost 9 lbs since his last visit 6 months ago while on contrave. He reports intermittent leg swelling, unchanged from last visit. He denies any chest pain, fever, cough, nausea, vomiting, or other medical complaints at this time.     Review of Systems   Constitutional:  Positive for fatigue. Negative for fever.   Respiratory:  Negative for cough, shortness of breath and wheezing.    Cardiovascular:  Positive for leg swelling. Negative for chest pain.   Gastrointestinal:  Negative for abdominal pain, diarrhea, nausea and vomiting.   Neurological:  Negative for headaches.   Psychiatric/Behavioral:  Positive for dysphoric mood.         Objective     Physical Exam  Constitutional:       General: He is not in acute distress.     Appearance: Normal appearance. He is obese. He is not ill-appearing.   HENT:      Head: Normocephalic and atraumatic.   Cardiovascular:      Rate and Rhythm: Normal rate and regular rhythm.      Pulses: Normal pulses.      Heart sounds: Normal heart sounds. No murmur heard.    No gallop.   Pulmonary:      Effort: Pulmonary effort is normal. No respiratory distress.      Breath sounds: Normal breath sounds. No wheezing or rales.   Abdominal:      General: Abdomen is flat. There is no distension.      Palpations:  Abdomen is soft.      Tenderness: There is no abdominal tenderness. There is no guarding or rebound.   Skin:     General: Skin is warm and dry.   Neurological:      Mental Status: He is alert and oriented to person, place, and time.          Assessment and Plan   43 year old male here for a 6 month follow up. He reports continued fatigue in the setting of chronic anxiety/depression and obesity. Will obtain TSH, CBC, CMP to evaluate for other causes of fatigue.    Asthma - well managed  HTN - managed with amlodipine, carvedilol, indapamide, and losartan. Continue current management  JOHN - managed with a CPAP  Anxiety/depression - follows with a medical psychologist manages his prozac/vraylar  Obesity - continue contrave

## 2023-05-28 ENCOUNTER — TELEPHONE (OUTPATIENT)
Dept: FAMILY MEDICINE | Facility: CLINIC | Age: 43
End: 2023-05-28
Payer: COMMERCIAL

## 2023-05-28 RX ORDER — POTASSIUM CHLORIDE 20 MEQ/1
20 TABLET, EXTENDED RELEASE ORAL 2 TIMES DAILY
Qty: 60 TABLET | Refills: 5 | Status: SHIPPED | OUTPATIENT
Start: 2023-05-28 | End: 2023-09-15 | Stop reason: SDUPTHER

## 2023-05-28 NOTE — TELEPHONE ENCOUNTER
inform pt via phone that I reviewed the test results and note the following:    Pertinent results:    Mildly low potassium.    E sent in kdur.    Repeat bmp in 1 week

## 2023-05-29 NOTE — TELEPHONE ENCOUNTER
No care due was identified.  [facility] Embedded Care Due Messages. Reference number: [id number].   5/29/2023 9:23:53 AM CDT

## 2023-05-30 RX ORDER — NALTREXONE HYDROCHLORIDE AND BUPROPION HYDROCHLORIDE 8; 90 MG/1; MG/1
TABLET, EXTENDED RELEASE ORAL
Qty: 120 TABLET | Refills: 1 | Status: SHIPPED | OUTPATIENT
Start: 2023-05-30 | End: 2023-07-25

## 2023-06-21 DIAGNOSIS — I10 ESSENTIAL HYPERTENSION: ICD-10-CM

## 2023-06-22 RX ORDER — AMLODIPINE BESYLATE 10 MG/1
TABLET ORAL
Qty: 90 TABLET | Refills: 3 | Status: SHIPPED | OUTPATIENT
Start: 2023-06-22 | End: 2024-03-27 | Stop reason: SDUPTHER

## 2023-06-22 RX ORDER — CARVEDILOL 25 MG/1
TABLET ORAL
Qty: 180 TABLET | Refills: 3 | Status: SHIPPED | OUTPATIENT
Start: 2023-06-22 | End: 2023-10-11 | Stop reason: SDUPTHER

## 2023-06-22 NOTE — TELEPHONE ENCOUNTER
No care due was identified.  [facility] Embedded Care Due Messages. Reference number: [id number].   6/21/2023 10:22:48 PM CDT

## 2023-06-22 NOTE — TELEPHONE ENCOUNTER
Refill Decision Note   Greg Garcia  is requesting a refill authorization.  Brief Assessment and Rationale for Refill:  Approve     Medication Therapy Plan:         Alert overridden per protocol: Yes   Pharmacist review requested: Yes   Comments:     No Care Gaps recommended.     Note composed:4:04 PM 06/22/2023

## 2023-06-22 NOTE — TELEPHONE ENCOUNTER
Refill Routing Note   Medication(s) are not appropriate for processing by [facility] for the following reason(s):      Drug-disease interaction    [facility] action(s):  Defer  Approve Care Due:  None identified        Pharmacist review requested: Yes     Appointments  past 12m or future 3m with PCP    Date Provider   Last Visit   5/15/2023 Ignacio Terrell MD   Next Visit   Visit date not found Ignacio Terrell MD   ED visits in past 90 days: 0        Note composed:3:05 PM 06/22/2023

## 2023-09-14 ENCOUNTER — LAB VISIT (OUTPATIENT)
Dept: LAB | Facility: HOSPITAL | Age: 43
End: 2023-09-14
Attending: NURSE PRACTITIONER
Payer: COMMERCIAL

## 2023-09-14 ENCOUNTER — OFFICE VISIT (OUTPATIENT)
Dept: FAMILY MEDICINE | Facility: CLINIC | Age: 43
End: 2023-09-14
Payer: COMMERCIAL

## 2023-09-14 VITALS
HEART RATE: 75 BPM | TEMPERATURE: 98 F | WEIGHT: 315 LBS | DIASTOLIC BLOOD PRESSURE: 84 MMHG | OXYGEN SATURATION: 97 % | SYSTOLIC BLOOD PRESSURE: 126 MMHG | HEIGHT: 72 IN | BODY MASS INDEX: 42.66 KG/M2

## 2023-09-14 DIAGNOSIS — E66.01 MORBID OBESITY: ICD-10-CM

## 2023-09-14 DIAGNOSIS — E87.6 HYPOKALEMIA: ICD-10-CM

## 2023-09-14 DIAGNOSIS — L03.039 PARONYCHIA OF GREAT TOE: Primary | ICD-10-CM

## 2023-09-14 LAB
ANION GAP SERPL CALC-SCNC: 13 MMOL/L (ref 8–16)
BUN SERPL-MCNC: 9 MG/DL (ref 6–20)
CALCIUM SERPL-MCNC: 9.5 MG/DL (ref 8.7–10.5)
CHLORIDE SERPL-SCNC: 98 MMOL/L (ref 95–110)
CO2 SERPL-SCNC: 30 MMOL/L (ref 23–29)
CREAT SERPL-MCNC: 1.3 MG/DL (ref 0.5–1.4)
EST. GFR  (NO RACE VARIABLE): >60 ML/MIN/1.73 M^2
GLUCOSE SERPL-MCNC: 154 MG/DL (ref 70–110)
POTASSIUM SERPL-SCNC: 3.1 MMOL/L (ref 3.5–5.1)
SODIUM SERPL-SCNC: 141 MMOL/L (ref 136–145)

## 2023-09-14 PROCEDURE — 99214 OFFICE O/P EST MOD 30 MIN: CPT | Mod: S$GLB,,, | Performed by: NURSE PRACTITIONER

## 2023-09-14 PROCEDURE — 3074F PR MOST RECENT SYSTOLIC BLOOD PRESSURE < 130 MM HG: ICD-10-PCS | Mod: CPTII,S$GLB,, | Performed by: NURSE PRACTITIONER

## 2023-09-14 PROCEDURE — 1159F MED LIST DOCD IN RCRD: CPT | Mod: CPTII,S$GLB,, | Performed by: NURSE PRACTITIONER

## 2023-09-14 PROCEDURE — 99999 PR PBB SHADOW E&M-EST. PATIENT-LVL V: CPT | Mod: PBBFAC,,, | Performed by: NURSE PRACTITIONER

## 2023-09-14 PROCEDURE — 99999 PR PBB SHADOW E&M-EST. PATIENT-LVL V: ICD-10-PCS | Mod: PBBFAC,,, | Performed by: NURSE PRACTITIONER

## 2023-09-14 PROCEDURE — 99214 PR OFFICE/OUTPT VISIT, EST, LEVL IV, 30-39 MIN: ICD-10-PCS | Mod: S$GLB,,, | Performed by: NURSE PRACTITIONER

## 2023-09-14 PROCEDURE — 3074F SYST BP LT 130 MM HG: CPT | Mod: CPTII,S$GLB,, | Performed by: NURSE PRACTITIONER

## 2023-09-14 PROCEDURE — 3079F DIAST BP 80-89 MM HG: CPT | Mod: CPTII,S$GLB,, | Performed by: NURSE PRACTITIONER

## 2023-09-14 PROCEDURE — 3079F PR MOST RECENT DIASTOLIC BLOOD PRESSURE 80-89 MM HG: ICD-10-PCS | Mod: CPTII,S$GLB,, | Performed by: NURSE PRACTITIONER

## 2023-09-14 PROCEDURE — 4010F ACE/ARB THERAPY RXD/TAKEN: CPT | Mod: CPTII,S$GLB,, | Performed by: NURSE PRACTITIONER

## 2023-09-14 PROCEDURE — 3008F PR BODY MASS INDEX (BMI) DOCUMENTED: ICD-10-PCS | Mod: CPTII,S$GLB,, | Performed by: NURSE PRACTITIONER

## 2023-09-14 PROCEDURE — 1159F PR MEDICATION LIST DOCUMENTED IN MEDICAL RECORD: ICD-10-PCS | Mod: CPTII,S$GLB,, | Performed by: NURSE PRACTITIONER

## 2023-09-14 PROCEDURE — 36415 COLL VENOUS BLD VENIPUNCTURE: CPT | Mod: PO | Performed by: NURSE PRACTITIONER

## 2023-09-14 PROCEDURE — 4010F PR ACE/ARB THEARPY RXD/TAKEN: ICD-10-PCS | Mod: CPTII,S$GLB,, | Performed by: NURSE PRACTITIONER

## 2023-09-14 PROCEDURE — 3008F BODY MASS INDEX DOCD: CPT | Mod: CPTII,S$GLB,, | Performed by: NURSE PRACTITIONER

## 2023-09-14 PROCEDURE — 80048 BASIC METABOLIC PNL TOTAL CA: CPT | Performed by: NURSE PRACTITIONER

## 2023-09-14 RX ORDER — MUPIROCIN 20 MG/G
OINTMENT TOPICAL 3 TIMES DAILY
Qty: 30 G | Refills: 0 | Status: SHIPPED | OUTPATIENT
Start: 2023-09-14

## 2023-09-14 RX ORDER — SULFAMETHOXAZOLE AND TRIMETHOPRIM 800; 160 MG/1; MG/1
1 TABLET ORAL 2 TIMES DAILY
Qty: 14 TABLET | Refills: 0 | Status: SHIPPED | OUTPATIENT
Start: 2023-09-14 | End: 2023-09-21

## 2023-09-14 NOTE — PROGRESS NOTES
Subjective:       Patient ID: Greg Garcia is a 43 y.o. male.    Chief Complaint: Nail Problem (Toe nail is infected. Pt states that this has been going on for two weeks)    HPI  Patient reports ingrown toenail with associated infection waxing and waning in severity for the past 2 weeks. Denies fever or current drainage. Toe tender to touch    Morbid obesity: needs refill on Contrave. Tolerating without adverse effect    Hypokalemia on labs earlier this year, prescribed potassium--never filled    Former patient of Dr Terrell. Scheduled to establish with Dr Tolliver next month     Vitals:    09/14/23 1354   BP: 126/84   Pulse: 75   Temp: 98.2 °F (36.8 °C)     Review of Systems   Constitutional:  Negative for fever.   Musculoskeletal:  Positive for arthralgias.   Skin:  Positive for color change.       Past Medical History:   Diagnosis Date    AR (allergic rhinitis)     Asthma     Cervicalgia     Depression     sees Amy Olivas psychologist    Morbid obesity     BMI 59    JOHN (obstructive sleep apnea)     cpap    Primary nocturnal enuresis 1995    until age 15, then resolved spontaneously     Objective:      Physical Exam  Constitutional:       General: He is not in acute distress.     Appearance: He is well-developed. He is not ill-appearing, toxic-appearing or diaphoretic.   HENT:      Right Ear: Hearing normal.      Left Ear: Hearing normal.   Pulmonary:      Effort: No tachypnea or respiratory distress.   Feet:      Right foot:      Skin integrity: Erythema and warmth present.      Toenail Condition: Right toenails are ingrown.   Skin:     Coloration: Skin is not pale.   Neurological:      Mental Status: He is alert and oriented to person, place, and time.   Psychiatric:         Speech: Speech normal.         Behavior: Behavior normal.         Thought Content: Thought content normal.         Judgment: Judgment normal.         Assessment:       1. Paronychia of great toe    2. Hypokalemia    3. Morbid  obesity        Plan:       Paronychia of great toe  -     Ambulatory referral/consult to Podiatry; Future; Expected date: 09/21/2023  -     sulfamethoxazole-trimethoprim 800-160mg (BACTRIM DS) 800-160 mg Tab; Take 1 tablet by mouth 2 (two) times daily. for 7 days  Dispense: 14 tablet; Refill: 0  -     mupirocin (BACTROBAN) 2 % ointment; Apply topically 3 (three) times daily.  Dispense: 30 g; Refill: 0    Hypokalemia  -     Basic Metabolic Panel; Future; Expected date: 09/14/2023    Morbid obesity  -     naltrexone-bupropion (CONTRAVE) 8-90 mg TbSR; Take 2 tablets by mouth 2 (two) times daily.  Dispense: 360 tablet; Refill: 1      Labs today  Podiatry eval  FU next month as scheduled with Dr Tolliver        Medication List with Changes/Refills   New Medications    MUPIROCIN (BACTROBAN) 2 % OINTMENT    Apply topically 3 (three) times daily.    NALTREXONE-BUPROPION (CONTRAVE) 8-90 MG TBSR    Take 2 tablets by mouth 2 (two) times daily.    SULFAMETHOXAZOLE-TRIMETHOPRIM 800-160MG (BACTRIM DS) 800-160 MG TAB    Take 1 tablet by mouth 2 (two) times daily. for 7 days   Current Medications    ALBUTEROL (VENTOLIN HFA) 90 MCG/ACTUATION INHALER    Inhale 2 puffs into the lungs every 6 (six) hours as needed for Wheezing.    AMLODIPINE (NORVASC) 10 MG TABLET    TAKE 1 TABLET DAILY    AZELASTINE (ASTELIN) 137 MCG (0.1 %) NASAL SPRAY    1 spray (137 mcg total) by Nasal route 2 (two) times daily.    BREXPIPRAZOLE (REXULTI) 3 MG TAB    Take 3 mg by mouth once daily.    CARVEDILOL (COREG) 25 MG TABLET    TAKE 1 TABLET TWICE A DAY WITH MEALS    CETIRIZINE-PSEUDOEPHEDRINE 5-120 MG TB12    Take 5 mg by mouth 2 (two) times daily.    CHOLECALCIFEROL, VITAMIN D3, 125 MCG (5,000 UNIT) TAB    Take 5,000 Units by mouth once daily.    ESOMEPRAZOLE (NEXIUM) 20 MG CAPSULE    Take 20 mg by mouth once daily.    FLUOXETINE 20 MG CAPSULE    Take 3 capsules (60 mg total) by mouth once daily.    FLUTICASONE-SALMETEROL DISKUS INHALER 250-50 MCG    USE 1  INHALATION ONCE DAILY AS DIRECTED (CONTROLLER).  EXPIRES 30 DAYS AFTER FIRST USE    GLUCOSAMINE-CHONDROITIN 500-400 MG TABLET    Take 2 tablets by mouth Daily.    IBUPROFEN (ADVIL,MOTRIN) 200 MG TABLET    Take 200 mg by mouth every 8 (eight) hours as needed. 4 tab twice daily    INDAPAMIDE (LOZOL) 2.5 MG TAB    TAKE 1 TABLET DAILY    KETOCONAZOLE (NIZORAL) 2 % CREAM    APPLY A THIN FILM TO AFFECTED AREA OF GROIN DAILY AS NEEDED FOR FLARE    KETOCONAZOLE (NIZORAL) 2 % SHAMPOO    Wash hair with medicated shampoo at least 2x/week - let sit on scalp at least 5 minutes prior to rinsing    LACTOBAC 42-BIFID 4-[id number] -50 MG PWPK    Take 1 Package by mouth.    LOSARTAN (COZAAR) 100 MG TABLET    TAKE 1 TABLET DAILY    MONTELUKAST (SINGULAIR) 10 MG TABLET    Take 1 tablet (10 mg total) by mouth every evening.    MULTIVITAMIN (THERAGRAN) PER TABLET    Take 1 tablet by mouth once daily.    POTASSIUM CHLORIDE SA (K-DUR,KLOR-CON) 20 MEQ TABLET    Take 1 tablet (20 mEq total) by mouth 2 (two) times daily.    SOLIFENACIN (VESICARE) 10 MG TABLET    Take 1 tablet (10 mg total) by mouth once daily.    TRIAMCINOLONE ACETONIDE 0.1% (KENALOG) 0.1 % CREAM    Apply topically 2 (two) times daily. X 2 wks then off 2 wks. Repeat as needed    VRAYLAR 3 MG CAP    Take 3 mg by mouth every morning.

## 2023-09-15 DIAGNOSIS — R73.9 HYPERGLYCEMIA: ICD-10-CM

## 2023-09-15 DIAGNOSIS — E87.6 HYPOKALEMIA: Primary | ICD-10-CM

## 2023-09-15 RX ORDER — POTASSIUM CHLORIDE 20 MEQ/1
20 TABLET, EXTENDED RELEASE ORAL 2 TIMES DAILY
Qty: 60 TABLET | Refills: 5 | Status: SHIPPED | OUTPATIENT
Start: 2023-09-15

## 2023-10-11 ENCOUNTER — OFFICE VISIT (OUTPATIENT)
Dept: FAMILY MEDICINE | Facility: CLINIC | Age: 43
End: 2023-10-11
Payer: COMMERCIAL

## 2023-10-11 VITALS
WEIGHT: 315 LBS | HEART RATE: 83 BPM | DIASTOLIC BLOOD PRESSURE: 80 MMHG | OXYGEN SATURATION: 96 % | BODY MASS INDEX: 42.66 KG/M2 | SYSTOLIC BLOOD PRESSURE: 122 MMHG | HEIGHT: 72 IN

## 2023-10-11 DIAGNOSIS — R53.83 FATIGUE, UNSPECIFIED TYPE: ICD-10-CM

## 2023-10-11 DIAGNOSIS — J45.20 MILD INTERMITTENT ASTHMA WITHOUT COMPLICATION: ICD-10-CM

## 2023-10-11 DIAGNOSIS — Z00.00 WELL ADULT EXAM: Primary | ICD-10-CM

## 2023-10-11 DIAGNOSIS — Z23 NEED FOR PNEUMOCOCCAL VACCINE: ICD-10-CM

## 2023-10-11 DIAGNOSIS — I10 ESSENTIAL HYPERTENSION: ICD-10-CM

## 2023-10-11 DIAGNOSIS — F33.1 MODERATE EPISODE OF RECURRENT MAJOR DEPRESSIVE DISORDER: ICD-10-CM

## 2023-10-11 DIAGNOSIS — E66.01 MORBID OBESITY: ICD-10-CM

## 2023-10-11 PROCEDURE — 3079F DIAST BP 80-89 MM HG: CPT | Mod: CPTII,S$GLB,, | Performed by: FAMILY MEDICINE

## 2023-10-11 PROCEDURE — 99999 PR PBB SHADOW E&M-EST. PATIENT-LVL IV: CPT | Mod: PBBFAC,,, | Performed by: FAMILY MEDICINE

## 2023-10-11 PROCEDURE — 3008F PR BODY MASS INDEX (BMI) DOCUMENTED: ICD-10-PCS | Mod: CPTII,S$GLB,, | Performed by: FAMILY MEDICINE

## 2023-10-11 PROCEDURE — 1159F MED LIST DOCD IN RCRD: CPT | Mod: CPTII,S$GLB,, | Performed by: FAMILY MEDICINE

## 2023-10-11 PROCEDURE — 3074F SYST BP LT 130 MM HG: CPT | Mod: CPTII,S$GLB,, | Performed by: FAMILY MEDICINE

## 2023-10-11 PROCEDURE — 3008F BODY MASS INDEX DOCD: CPT | Mod: CPTII,S$GLB,, | Performed by: FAMILY MEDICINE

## 2023-10-11 PROCEDURE — 90471 PNEUMOCOCCAL CONJUGATE VACCINE 20-VALENT: ICD-10-PCS | Mod: S$GLB,,, | Performed by: FAMILY MEDICINE

## 2023-10-11 PROCEDURE — 99396 PREV VISIT EST AGE 40-64: CPT | Mod: 25,S$GLB,, | Performed by: FAMILY MEDICINE

## 2023-10-11 PROCEDURE — 4010F ACE/ARB THERAPY RXD/TAKEN: CPT | Mod: CPTII,S$GLB,, | Performed by: FAMILY MEDICINE

## 2023-10-11 PROCEDURE — 4010F PR ACE/ARB THEARPY RXD/TAKEN: ICD-10-PCS | Mod: CPTII,S$GLB,, | Performed by: FAMILY MEDICINE

## 2023-10-11 PROCEDURE — 3074F PR MOST RECENT SYSTOLIC BLOOD PRESSURE < 130 MM HG: ICD-10-PCS | Mod: CPTII,S$GLB,, | Performed by: FAMILY MEDICINE

## 2023-10-11 PROCEDURE — 99396 PR PREVENTIVE VISIT,EST,40-64: ICD-10-PCS | Mod: 25,S$GLB,, | Performed by: FAMILY MEDICINE

## 2023-10-11 PROCEDURE — 90471 IMMUNIZATION ADMIN: CPT | Mod: S$GLB,,, | Performed by: FAMILY MEDICINE

## 2023-10-11 PROCEDURE — 1160F RVW MEDS BY RX/DR IN RCRD: CPT | Mod: CPTII,S$GLB,, | Performed by: FAMILY MEDICINE

## 2023-10-11 PROCEDURE — 90677 PNEUMOCOCCAL CONJUGATE VACCINE 20-VALENT: ICD-10-PCS | Mod: S$GLB,,, | Performed by: FAMILY MEDICINE

## 2023-10-11 PROCEDURE — 1159F PR MEDICATION LIST DOCUMENTED IN MEDICAL RECORD: ICD-10-PCS | Mod: CPTII,S$GLB,, | Performed by: FAMILY MEDICINE

## 2023-10-11 PROCEDURE — 99999 PR PBB SHADOW E&M-EST. PATIENT-LVL IV: ICD-10-PCS | Mod: PBBFAC,,, | Performed by: FAMILY MEDICINE

## 2023-10-11 PROCEDURE — 1160F PR REVIEW ALL MEDS BY PRESCRIBER/CLIN PHARMACIST DOCUMENTED: ICD-10-PCS | Mod: CPTII,S$GLB,, | Performed by: FAMILY MEDICINE

## 2023-10-11 PROCEDURE — 90677 PCV20 VACCINE IM: CPT | Mod: S$GLB,,, | Performed by: FAMILY MEDICINE

## 2023-10-11 PROCEDURE — 3079F PR MOST RECENT DIASTOLIC BLOOD PRESSURE 80-89 MM HG: ICD-10-PCS | Mod: CPTII,S$GLB,, | Performed by: FAMILY MEDICINE

## 2023-10-11 RX ORDER — CARVEDILOL 12.5 MG/1
12.5 TABLET ORAL 2 TIMES DAILY WITH MEALS
Qty: 180 TABLET | Refills: 3
Start: 2023-10-11 | End: 2024-03-27 | Stop reason: SDUPTHER

## 2023-10-11 NOTE — PROGRESS NOTES
Subjective:       Patient ID: Greg Garcia is a 43 y.o. male.    Chief Complaint: Establish Care    Patient here today for annual well adult exam.   Does not eat a healthy diet.  Currently not exercising regularly.    Immunizations: Tdap 2017  Last Lab Work: 2023  Colon Ca screening:  due at 45  Prostate Ca Screening: Due at 50     MDD: Seeing Dr. Olivas, medical psychologist, for anxiety/depression management.  Also, see Dr. Blanco, psychotherapist, for counseling. On prozac and Vrylar  Obesity:  he is on Contrave.  He has been losing weight.  HTN:  he is on Norvasc, losartan, indapamide and Coreg  Asthma: He is on Advair, Singular and Albuterol for PRN use.  JOHN:  on CPAP      Review of Systems   Constitutional:  Positive for fatigue. Negative for appetite change and fever.   HENT:  Negative for congestion, sneezing and sore throat.    Respiratory:  Negative for cough, shortness of breath and wheezing.    Cardiovascular:  Negative for chest pain and palpitations.   Gastrointestinal:  Negative for abdominal pain, constipation, diarrhea, nausea and vomiting.   Genitourinary:  Negative for difficulty urinating, dysuria, frequency and hematuria.   Neurological:  Negative for dizziness, syncope, weakness and headaches.   Psychiatric/Behavioral:  Negative for agitation, behavioral problems and confusion. The patient is not nervous/anxious.        Objective:      Vitals:    10/11/23 1323   BP: 122/80   BP Location: Right arm   Patient Position: Sitting   BP Method: X-Large (Manual)   Pulse: 83   SpO2: 96%   Weight: (!) 188.2 kg (415 lb)   Height: 6' (1.829 m)      Physical Exam  Constitutional:       General: He is not in acute distress.     Appearance: He is obese.   Cardiovascular:      Rate and Rhythm: Normal rate and regular rhythm.      Heart sounds: Normal heart sounds. No murmur heard.  Pulmonary:      Effort: Pulmonary effort is normal. No respiratory distress.      Breath sounds: Normal breath sounds.  No wheezing, rhonchi or rales.   Skin:     General: Skin is warm and dry.   Neurological:      General: No focal deficit present.      Mental Status: He is alert.   Psychiatric:         Mood and Affect: Mood normal.         Behavior: Behavior normal.         Thought Content: Thought content normal.         Results for orders placed or performed in visit on 09/14/23   Basic Metabolic Panel   Result Value Ref Range    Sodium 141 136 - 145 mmol/L    Potassium 3.1 (L) 3.5 - 5.1 mmol/L    Chloride 98 95 - 110 mmol/L    CO2 30 (H) 23 - 29 mmol/L    Glucose 154 (H) 70 - 110 mg/dL    BUN 9 6 - 20 mg/dL    Creatinine 1.3 0.5 - 1.4 mg/dL    Calcium 9.5 8.7 - 10.5 mg/dL    Anion Gap 13 8 - 16 mmol/L    eGFR >60.0 >60 mL/min/1.73 m^2      Assessment:       1. Well adult exam    2. Essential hypertension    3. Morbid obesity    4. Moderate episode of recurrent major depressive disorder    5. Mild intermittent asthma without complication    6. Need for pneumococcal vaccine    7. Fatigue, unspecified type        Plan:       Well adult exam  -     CBC Auto Differential; Future; Expected date: 10/11/2023  -     Comprehensive Metabolic Panel; Future; Expected date: 10/11/2023  -     Hemoglobin A1C; Future; Expected date: 10/11/2023  -     Lipid Panel; Future; Expected date: 10/11/2023  -     TSH; Future; Expected date: 10/11/2023  -     Urinalysis, Reflex to Urine Culture Urine, Clean Catch; Future; Expected date: 10/11/2023  -     TESTOSTERONE; Future; Expected date: 10/11/2023  Continue to work on dietary improvements (decrease overall calorie intake, decrease sugar and carb intake, decrease animal protein intake)  Continue to exercise at least 30-40 minutes, 3 times per week  Immunizations were discussed and prevnar today  Preventative exams were discussed and will check labs   Essential hypertension  -     Comprehensive Metabolic Panel; Future; Expected date: 10/11/2023  -     carvediloL (COREG) 12.5 MG tablet; Take 1 tablet  (12.5 mg total) by mouth 2 (two) times daily with meals.  Dispense: 180 tablet; Refill: 3    Morbid obesity  Continue Contrave   Moderate episode of recurrent major depressive disorder    Mild intermittent asthma without complication    Need for pneumococcal vaccine  -     (In Office Administered) Pneumococcal Conjugate Vaccine (20 Valent) (IM) (Preferred)    Fatigue, unspecified type  -     TESTOSTERONE; Future; Expected date: 10/11/2023          Medication List with Changes/Refills   Current Medications    ALBUTEROL (VENTOLIN HFA) 90 MCG/ACTUATION INHALER    Inhale 2 puffs into the lungs every 6 (six) hours as needed for Wheezing.    AMLODIPINE (NORVASC) 10 MG TABLET    TAKE 1 TABLET DAILY    AZELASTINE (ASTELIN) 137 MCG (0.1 %) NASAL SPRAY    1 spray (137 mcg total) by Nasal route 2 (two) times daily.    CETIRIZINE-PSEUDOEPHEDRINE 5-120 MG TB12    Take 5 mg by mouth 2 (two) times daily.    CHOLECALCIFEROL, VITAMIN D3, 125 MCG (5,000 UNIT) TAB    Take 5,000 Units by mouth once daily.    ESOMEPRAZOLE (NEXIUM) 20 MG CAPSULE    Take 20 mg by mouth once daily.    FLUOXETINE 20 MG CAPSULE    Take 3 capsules (60 mg total) by mouth once daily.    FLUTICASONE-SALMETEROL DISKUS INHALER 250-50 MCG    USE 1 INHALATION ONCE DAILY AS DIRECTED (CONTROLLER).  EXPIRES 30 DAYS AFTER FIRST USE    GLUCOSAMINE-CHONDROITIN 500-400 MG TABLET    Take 2 tablets by mouth Daily.    IBUPROFEN (ADVIL,MOTRIN) 200 MG TABLET    Take 200 mg by mouth every 8 (eight) hours as needed. 4 tab twice daily    INDAPAMIDE (LOZOL) 2.5 MG TAB    TAKE 1 TABLET DAILY    KETOCONAZOLE (NIZORAL) 2 % CREAM    APPLY A THIN FILM TO AFFECTED AREA OF GROIN DAILY AS NEEDED FOR FLARE    KETOCONAZOLE (NIZORAL) 2 % SHAMPOO    Wash hair with medicated shampoo at least 2x/week - let sit on scalp at least 5 minutes prior to rinsing    LACTOBAC 42-BIFID 5-[id number] -50 MG PWPK    Take 1 Package by mouth.    LOSARTAN (COZAAR) 100 MG TABLET    TAKE 1 TABLET DAILY     MONTELUKAST (SINGULAIR) 10 MG TABLET    Take 1 tablet (10 mg total) by mouth every evening.    MULTIVITAMIN (THERAGRAN) PER TABLET    Take 1 tablet by mouth once daily.    MUPIROCIN (BACTROBAN) 2 % OINTMENT    Apply topically 3 (three) times daily.    NALTREXONE-BUPROPION (CONTRAVE) 8-90 MG TBSR    Take 2 tablets by mouth 2 (two) times daily.    POTASSIUM CHLORIDE SA (K-DUR,KLOR-CON) 20 MEQ TABLET    Take 1 tablet (20 mEq total) by mouth 2 (two) times daily.    SOLIFENACIN (VESICARE) 10 MG TABLET    Take 1 tablet (10 mg total) by mouth once daily.    TRIAMCINOLONE ACETONIDE 0.1% (KENALOG) 0.1 % CREAM    Apply topically 2 (two) times daily. X 2 wks then off 2 wks. Repeat as needed    VRAYLAR 3 MG CAP    Take 3 mg by mouth every morning.   Changed and/or Refilled Medications    Modified Medication Previous Medication    CARVEDILOL (COREG) 12.5 MG TABLET carvediloL (COREG) 25 MG tablet       Take 1 tablet (12.5 mg total) by mouth 2 (two) times daily with meals.    TAKE 1 TABLET TWICE A DAY WITH MEALS   Discontinued Medications    BREXPIPRAZOLE (REXULTI) 3 MG TAB    Take 3 mg by mouth once daily.

## 2023-10-12 ENCOUNTER — LAB VISIT (OUTPATIENT)
Dept: LAB | Facility: HOSPITAL | Age: 43
End: 2023-10-12
Attending: FAMILY MEDICINE
Payer: COMMERCIAL

## 2023-10-12 DIAGNOSIS — Z00.00 WELL ADULT EXAM: ICD-10-CM

## 2023-10-12 DIAGNOSIS — R53.83 FATIGUE, UNSPECIFIED TYPE: ICD-10-CM

## 2023-10-12 DIAGNOSIS — I10 ESSENTIAL HYPERTENSION: ICD-10-CM

## 2023-10-12 LAB
ALBUMIN SERPL BCP-MCNC: 3.5 G/DL (ref 3.5–5.2)
ALP SERPL-CCNC: 98 U/L (ref 55–135)
ALT SERPL W/O P-5'-P-CCNC: 21 U/L (ref 10–44)
ANION GAP SERPL CALC-SCNC: 10 MMOL/L (ref 8–16)
AST SERPL-CCNC: 18 U/L (ref 10–40)
BASOPHILS # BLD AUTO: 0.1 K/UL (ref 0–0.2)
BASOPHILS NFR BLD: 1.1 % (ref 0–1.9)
BILIRUB SERPL-MCNC: 0.6 MG/DL (ref 0.1–1)
BUN SERPL-MCNC: 10 MG/DL (ref 6–20)
CALCIUM SERPL-MCNC: 9.3 MG/DL (ref 8.7–10.5)
CHLORIDE SERPL-SCNC: 103 MMOL/L (ref 95–110)
CHOLEST SERPL-MCNC: 188 MG/DL (ref 120–199)
CHOLEST/HDLC SERPL: 6.1 {RATIO} (ref 2–5)
CO2 SERPL-SCNC: 28 MMOL/L (ref 23–29)
CREAT SERPL-MCNC: 1 MG/DL (ref 0.5–1.4)
DIFFERENTIAL METHOD: NORMAL
EOSINOPHIL # BLD AUTO: 0.3 K/UL (ref 0–0.5)
EOSINOPHIL NFR BLD: 3.5 % (ref 0–8)
ERYTHROCYTE [DISTWIDTH] IN BLOOD BY AUTOMATED COUNT: 12.6 % (ref 11.5–14.5)
EST. GFR  (NO RACE VARIABLE): >60 ML/MIN/1.73 M^2
ESTIMATED AVG GLUCOSE: 100 MG/DL (ref 68–131)
GLUCOSE SERPL-MCNC: 99 MG/DL (ref 70–110)
HBA1C MFR BLD: 5.1 % (ref 4–5.6)
HCT VFR BLD AUTO: 43.7 % (ref 40–54)
HDLC SERPL-MCNC: 31 MG/DL (ref 40–75)
HDLC SERPL: 16.5 % (ref 20–50)
HGB BLD-MCNC: 14.8 G/DL (ref 14–18)
IMM GRANULOCYTES # BLD AUTO: 0.02 K/UL (ref 0–0.04)
IMM GRANULOCYTES NFR BLD AUTO: 0.2 % (ref 0–0.5)
LDLC SERPL CALC-MCNC: 129 MG/DL (ref 63–159)
LYMPHOCYTES # BLD AUTO: 2.9 K/UL (ref 1–4.8)
LYMPHOCYTES NFR BLD: 31.1 % (ref 18–48)
MCH RBC QN AUTO: 30.8 PG (ref 27–31)
MCHC RBC AUTO-ENTMCNC: 33.9 G/DL (ref 32–36)
MCV RBC AUTO: 91 FL (ref 82–98)
MONOCYTES # BLD AUTO: 0.8 K/UL (ref 0.3–1)
MONOCYTES NFR BLD: 8.3 % (ref 4–15)
NEUTROPHILS # BLD AUTO: 5.1 K/UL (ref 1.8–7.7)
NEUTROPHILS NFR BLD: 55.8 % (ref 38–73)
NONHDLC SERPL-MCNC: 157 MG/DL
NRBC BLD-RTO: 0 /100 WBC
PLATELET # BLD AUTO: 276 K/UL (ref 150–450)
PMV BLD AUTO: 12.5 FL (ref 9.2–12.9)
POTASSIUM SERPL-SCNC: 3.5 MMOL/L (ref 3.5–5.1)
PROT SERPL-MCNC: 6.6 G/DL (ref 6–8.4)
RBC # BLD AUTO: 4.8 M/UL (ref 4.6–6.2)
SODIUM SERPL-SCNC: 141 MMOL/L (ref 136–145)
TRIGL SERPL-MCNC: 140 MG/DL (ref 30–150)
TSH SERPL DL<=0.005 MIU/L-ACNC: 3.69 UIU/ML (ref 0.4–4)
WBC # BLD AUTO: 9.2 K/UL (ref 3.9–12.7)

## 2023-10-12 PROCEDURE — 84443 ASSAY THYROID STIM HORMONE: CPT | Performed by: FAMILY MEDICINE

## 2023-10-12 PROCEDURE — 85025 COMPLETE CBC W/AUTO DIFF WBC: CPT | Performed by: FAMILY MEDICINE

## 2023-10-12 PROCEDURE — 80061 LIPID PANEL: CPT | Performed by: FAMILY MEDICINE

## 2023-10-12 PROCEDURE — 80053 COMPREHEN METABOLIC PANEL: CPT | Performed by: FAMILY MEDICINE

## 2023-10-12 PROCEDURE — 84403 ASSAY OF TOTAL TESTOSTERONE: CPT | Performed by: FAMILY MEDICINE

## 2023-10-12 PROCEDURE — 83036 HEMOGLOBIN GLYCOSYLATED A1C: CPT | Performed by: FAMILY MEDICINE

## 2023-10-12 PROCEDURE — 36415 COLL VENOUS BLD VENIPUNCTURE: CPT | Mod: PO | Performed by: FAMILY MEDICINE

## 2023-10-13 LAB — TESTOST SERPL-MCNC: 343 NG/DL (ref 304–1227)

## 2023-10-28 NOTE — PROGRESS NOTES
As per daughter of patient, she endorsed fall onto her right hip this Monday while attempting to ambulate. Mother stated patient did not appear to have any LOC or head strike, but did have bruising present over the right hip.      CT scan in ED demonstrated no hip or pelvis fractures, but did demonstrate several right-sided rib fractures    Plan:  Fall precautions  Pain control with scheduled Tylenol, Lidoderm patch, oxy 2.5 split tab for breakthrough Subjective:       Patient ID: Greg Garcia is a 37 y.o. male.    Chief Complaint: Hypertension (pt stated he's very clammy lately concerned maybe medication issue ) and Depression (follow up )    HPI     Reports feeling clammy since starting chlorthalidone approx 2 weeks ago.  Has vasovagal episode while getting lab draw today.    Depression controlled. Seeing psychotherapist.       Review of Systems      Review of Systems   Constitutional: Negative for fever and chills.   HENT: Negative for hearing loss and neck stiffness.    Eyes: Negative for redness and itching.   Respiratory: Negative for cough and choking.    Cardiovascular: Negative for chest pain and leg swelling.  Abdomen: Negative for abdominal pain and blood in stool.   Genitourinary: Negative for dysuria and flank pain.   Musculoskeletal: Negative for back pain and gait problem.   Neurological: Negative for light-headedness and headaches.   Hematological: Negative for adenopathy.       Objective:      Physical Exam   HENT:   Head: Atraumatic.   Eyes: Conjunctivae are normal. Pupils are equal, round, and reactive to light.   Neck: Normal range of motion.   Cardiovascular: Normal rate and regular rhythm.    No murmur heard.  Pulmonary/Chest: Effort normal and breath sounds normal. He has no wheezes.   Lymphadenopathy:     He has no cervical adenopathy.       Assessment:       1. Vasovagal episode    2. Essential hypertension    3. Recurrent major depressive disorder, in full remission    4. Clammy skin        Plan:       Vasovagal episode    Essential hypertension    Recurrent major depressive disorder, in full remission    Clammy skin    Other orders  -     metoprolol tartrate (LOPRESSOR) 25 MG tablet; Take 1 tablet (25 mg total) by mouth 2 (two) times daily.  Dispense: 60 tablet; Refill: 11      Plan:  D/c chlorthalidone. Start lopressor. Cont lisinopril. Serial bp checks.  Cont all other meds          Medication List with Changes/Refills   New  Medications    METOPROLOL TARTRATE (LOPRESSOR) 25 MG TABLET    Take 1 tablet (25 mg total) by mouth 2 (two) times daily.   Current Medications    ALBUTEROL (PROVENTIL) 5 MG/ML NEBULIZER SOLUTION    Take 2.5 mg by nebulization every 6 (six) hours as needed for Wheezing.    ALCORTIN A 2-1-1 % GLPK    AAA bid    ARTIFICIAL TEARS OINTMENT (REFRESH P.M.) OINT    every evening.    AZELASTINE (ASTELIN) 137 MCG (0.1 %) NASAL SPRAY    USE ONE SPRAY(S) IN EACH NOSTRIL TWICE DAILY    BUPROPION (WELLBUTRIN XL) 150 MG TB24 TABLET        CETIRIZINE-PSEUDOEPHEDRINE 5-120 MG TB12    Take 5 mg by mouth 2 (two) times daily.    CHOLECALCIFEROL, VITAMIN D3, (VITAMIN D3) 5,000 UNIT TAB    Take 5,000 Units by mouth once daily.    FLUOXETINE (PROZAC) 40 MG CAPSULE    Take 1 capsule by mouth once daily at 6am.    FLUTICASONE (FLONASE) 50 MCG/ACTUATION NASAL SPRAY    2 sprays by Each Nare route once daily.    FLUTICASONE-SALMETEROL 250-50 MCG/DOSE (ADVAIR DISKUS) 250-50 MCG/DOSE DISKUS INHALER    Inhale 1 puff into the lungs as needed.    GLUCOSAMINE-CHONDROITIN 500-400 MG TABLET    Take 1 tablet by mouth 2 (two) times daily.    IBUPROFEN (ADVIL,MOTRIN) 200 MG TABLET    Take 200 mg by mouth 2 (two) times daily. 4 tab twice daily    LISINOPRIL (PRINIVIL,ZESTRIL) 40 MG TABLET    Take 1 tablet (40 mg total) by mouth once daily.    MONTELUKAST (SINGULAIR) 10 MG TABLET    Take 10 mg by mouth once daily.    MONTELUKAST (SINGULAIR) 10 MG TABLET    TAKE ONE TABLET BY MOUTH ONCE DAILY    MULTIVITAMIN (ONE DAILY MULTIVITAMIN) PER TABLET    Take 1 tablet by mouth once daily.    OMEPRAZOLE (PRILOSEC OTC) 20 MG TABLET    Take 20 mg by mouth once daily.   Discontinued Medications    CHLORTHALIDONE (HYGROTEN) 25 MG TAB    Take 1/2 tablet (12.5 mg) by mouth once daily.

## 2023-12-17 ENCOUNTER — PATIENT MESSAGE (OUTPATIENT)
Dept: FAMILY MEDICINE | Facility: CLINIC | Age: 43
End: 2023-12-17
Payer: COMMERCIAL

## 2023-12-17 DIAGNOSIS — R35.1 NOCTURIA: ICD-10-CM

## 2023-12-17 DIAGNOSIS — R35.0 URINARY FREQUENCY: ICD-10-CM

## 2023-12-17 DIAGNOSIS — N39.41 URGE INCONTINENCE: ICD-10-CM

## 2023-12-17 RX ORDER — SOLIFENACIN SUCCINATE 10 MG/1
10 TABLET, FILM COATED ORAL DAILY
Qty: 90 TABLET | Refills: 3 | Status: SHIPPED | OUTPATIENT
Start: 2023-12-17

## 2023-12-17 RX ORDER — MONTELUKAST SODIUM 10 MG/1
10 TABLET ORAL NIGHTLY
Qty: 90 TABLET | Refills: 3 | Status: SHIPPED | OUTPATIENT
Start: 2023-12-17 | End: 2024-03-27 | Stop reason: SDUPTHER

## 2023-12-17 NOTE — TELEPHONE ENCOUNTER
Refill Decision Note   Greg Garcia  is requesting a refill authorization.  Brief Assessment and Rationale for Refill:  Approve     Medication Therapy Plan:       Medication Reconciliation Completed: No   Comments:     No Care Gaps recommended.     Note composed:4:35 PM 12/17/2023

## 2024-01-30 ENCOUNTER — TELEPHONE (OUTPATIENT)
Dept: PHARMACY | Facility: CLINIC | Age: 44
End: 2024-01-30
Payer: COMMERCIAL

## 2024-03-27 ENCOUNTER — OFFICE VISIT (OUTPATIENT)
Dept: FAMILY MEDICINE | Facility: CLINIC | Age: 44
End: 2024-03-27
Payer: COMMERCIAL

## 2024-03-27 VITALS
SYSTOLIC BLOOD PRESSURE: 118 MMHG | WEIGHT: 315 LBS | DIASTOLIC BLOOD PRESSURE: 82 MMHG | OXYGEN SATURATION: 96 % | HEIGHT: 72 IN | HEART RATE: 90 BPM | BODY MASS INDEX: 42.66 KG/M2 | TEMPERATURE: 99 F

## 2024-03-27 DIAGNOSIS — E66.01 MORBID OBESITY: ICD-10-CM

## 2024-03-27 DIAGNOSIS — J45.909 ASTHMA, UNSPECIFIED ASTHMA SEVERITY, UNSPECIFIED WHETHER COMPLICATED, UNSPECIFIED WHETHER PERSISTENT: ICD-10-CM

## 2024-03-27 DIAGNOSIS — I10 ESSENTIAL HYPERTENSION: Primary | ICD-10-CM

## 2024-03-27 DIAGNOSIS — F33.1 MODERATE EPISODE OF RECURRENT MAJOR DEPRESSIVE DISORDER: ICD-10-CM

## 2024-03-27 DIAGNOSIS — J45.20 MILD INTERMITTENT ASTHMA WITHOUT COMPLICATION: ICD-10-CM

## 2024-03-27 DIAGNOSIS — K92.1 BLOOD IN STOOL: ICD-10-CM

## 2024-03-27 PROCEDURE — 1160F RVW MEDS BY RX/DR IN RCRD: CPT | Mod: CPTII,S$GLB,, | Performed by: FAMILY MEDICINE

## 2024-03-27 PROCEDURE — 3074F SYST BP LT 130 MM HG: CPT | Mod: CPTII,S$GLB,, | Performed by: FAMILY MEDICINE

## 2024-03-27 PROCEDURE — 99214 OFFICE O/P EST MOD 30 MIN: CPT | Mod: S$GLB,,, | Performed by: FAMILY MEDICINE

## 2024-03-27 PROCEDURE — 4010F ACE/ARB THERAPY RXD/TAKEN: CPT | Mod: CPTII,S$GLB,, | Performed by: FAMILY MEDICINE

## 2024-03-27 PROCEDURE — 3079F DIAST BP 80-89 MM HG: CPT | Mod: CPTII,S$GLB,, | Performed by: FAMILY MEDICINE

## 2024-03-27 PROCEDURE — 99999 PR PBB SHADOW E&M-EST. PATIENT-LVL V: CPT | Mod: PBBFAC,,, | Performed by: FAMILY MEDICINE

## 2024-03-27 PROCEDURE — 1159F MED LIST DOCD IN RCRD: CPT | Mod: CPTII,S$GLB,, | Performed by: FAMILY MEDICINE

## 2024-03-27 PROCEDURE — 3008F BODY MASS INDEX DOCD: CPT | Mod: CPTII,S$GLB,, | Performed by: FAMILY MEDICINE

## 2024-03-27 RX ORDER — CARVEDILOL 12.5 MG/1
12.5 TABLET ORAL 2 TIMES DAILY WITH MEALS
Qty: 180 TABLET | Refills: 3 | Status: SHIPPED | OUTPATIENT
Start: 2024-03-27 | End: 2025-03-27

## 2024-03-27 RX ORDER — ALBUTEROL SULFATE 90 UG/1
2 AEROSOL, METERED RESPIRATORY (INHALATION) EVERY 6 HOURS PRN
Qty: 18 G | Refills: 3 | Status: SHIPPED | OUTPATIENT
Start: 2024-03-27 | End: 2025-03-27

## 2024-03-27 RX ORDER — AMLODIPINE BESYLATE 10 MG/1
10 TABLET ORAL DAILY
Qty: 90 TABLET | Refills: 3 | Status: SHIPPED | OUTPATIENT
Start: 2024-03-27

## 2024-03-27 RX ORDER — FLUTICASONE PROPIONATE AND SALMETEROL 250; 50 UG/1; UG/1
POWDER RESPIRATORY (INHALATION)
Qty: 180 EACH | Refills: 3 | Status: SHIPPED | OUTPATIENT
Start: 2024-03-27

## 2024-03-27 RX ORDER — NALTREXONE HYDROCHLORIDE AND BUPROPION HYDROCHLORIDE 8; 90 MG/1; MG/1
2 TABLET, EXTENDED RELEASE ORAL 2 TIMES DAILY
Qty: 120 TABLET | Refills: 1 | Status: SHIPPED | OUTPATIENT
Start: 2024-03-27 | End: 2024-06-17

## 2024-03-27 RX ORDER — INDAPAMIDE 2.5 MG/1
2.5 TABLET ORAL DAILY
Qty: 90 TABLET | Refills: 3 | Status: SHIPPED | OUTPATIENT
Start: 2024-03-27

## 2024-03-27 RX ORDER — MONTELUKAST SODIUM 10 MG/1
10 TABLET ORAL NIGHTLY
Qty: 90 TABLET | Refills: 3 | Status: SHIPPED | OUTPATIENT
Start: 2024-03-27

## 2024-03-27 RX ORDER — LOSARTAN POTASSIUM 100 MG/1
100 TABLET ORAL DAILY
Qty: 90 TABLET | Refills: 3 | Status: SHIPPED | OUTPATIENT
Start: 2024-03-27

## 2024-03-27 NOTE — PROGRESS NOTES
Subjective:       Patient ID: Greg Garcia is a 43 y.o. male.    Chief Complaint: Hypertension    Here today to follow up on chronic medical conditions.     MDD: Seeing Dr. Olivas, medical psychologist, for anxiety/depression management.  Also, see Dr. Blanco, psychotherapist, for counseling. On prozac and Vrylar  Obesity:  he is on Contrave.    HTN:  he is on Norvasc, losartan, indapamide and Coreg  Asthma: He is on Advair, Singular and Albuterol for PRN use.  JOHN:  on CPAP      Hypertension  Pertinent negatives include no chest pain, headaches, palpitations or shortness of breath.     Review of Systems   Constitutional:  Negative for appetite change, fatigue and fever.   HENT:  Negative for congestion, sneezing and sore throat.    Respiratory:  Negative for cough, shortness of breath and wheezing.    Cardiovascular:  Negative for chest pain and palpitations.   Gastrointestinal:  Positive for blood in stool (over the past 2-3 months). Negative for abdominal pain, constipation, diarrhea, nausea and vomiting.   Genitourinary:  Negative for difficulty urinating, dysuria, frequency and hematuria.   Neurological:  Negative for dizziness, syncope, weakness and headaches.   Psychiatric/Behavioral:  Negative for agitation, behavioral problems and confusion. The patient is not nervous/anxious.        Objective:      Vitals:    03/27/24 1610   BP: 118/82   Pulse: 90   Temp: 98.5 °F (36.9 °C)   TempSrc: Oral   SpO2: 96%   Weight: (!) 191.8 kg (422 lb 13.5 oz)   Height: 6' (1.829 m)      Physical Exam  Constitutional:       General: He is not in acute distress.  Cardiovascular:      Rate and Rhythm: Normal rate and regular rhythm.      Heart sounds: Normal heart sounds. No murmur heard.  Pulmonary:      Effort: Pulmonary effort is normal. No respiratory distress.      Breath sounds: Normal breath sounds. No wheezing, rhonchi or rales.   Skin:     General: Skin is warm and dry.   Neurological:      General: No focal  deficit present.      Mental Status: He is alert.   Psychiatric:         Mood and Affect: Mood normal.         Behavior: Behavior normal.         Thought Content: Thought content normal.         Results for orders placed or performed in visit on 10/12/23   CBC Auto Differential   Result Value Ref Range    WBC 9.20 3.90 - 12.70 K/uL    RBC 4.80 4.60 - 6.20 M/uL    Hemoglobin 14.8 14.0 - 18.0 g/dL    Hematocrit 43.7 40.0 - 54.0 %    MCV 91 82 - 98 fL    MCH 30.8 27.0 - 31.0 pg    MCHC 33.9 32.0 - 36.0 g/dL    RDW 12.6 11.5 - 14.5 %    Platelets 276 150 - 450 K/uL    MPV 12.5 9.2 - 12.9 fL    Immature Granulocytes 0.2 0.0 - 0.5 %    Gran # (ANC) 5.1 1.8 - 7.7 K/uL    Immature Grans (Abs) 0.02 0.00 - 0.04 K/uL    Lymph # 2.9 1.0 - 4.8 K/uL    Mono # 0.8 0.3 - 1.0 K/uL    Eos # 0.3 0.0 - 0.5 K/uL    Baso # 0.10 0.00 - 0.20 K/uL    nRBC 0 0 /100 WBC    Gran % 55.8 38.0 - 73.0 %    Lymph % 31.1 18.0 - 48.0 %    Mono % 8.3 4.0 - 15.0 %    Eosinophil % 3.5 0.0 - 8.0 %    Basophil % 1.1 0.0 - 1.9 %    Differential Method Automated    Comprehensive Metabolic Panel   Result Value Ref Range    Sodium 141 136 - 145 mmol/L    Potassium 3.5 3.5 - 5.1 mmol/L    Chloride 103 95 - 110 mmol/L    CO2 28 23 - 29 mmol/L    Glucose 99 70 - 110 mg/dL    BUN 10 6 - 20 mg/dL    Creatinine 1.0 0.5 - 1.4 mg/dL    Calcium 9.3 8.7 - 10.5 mg/dL    Total Protein 6.6 6.0 - 8.4 g/dL    Albumin 3.5 3.5 - 5.2 g/dL    Total Bilirubin 0.6 0.1 - 1.0 mg/dL    Alkaline Phosphatase 98 55 - 135 U/L    AST 18 10 - 40 U/L    ALT 21 10 - 44 U/L    eGFR >60.0 >60 mL/min/1.73 m^2    Anion Gap 10 8 - 16 mmol/L   Hemoglobin A1C   Result Value Ref Range    Hemoglobin A1C 5.1 4.0 - 5.6 %    Estimated Avg Glucose 100 68 - 131 mg/dL   Lipid Panel   Result Value Ref Range    Cholesterol 188 120 - 199 mg/dL    Triglycerides 140 30 - 150 mg/dL    HDL 31 (L) 40 - 75 mg/dL    LDL Cholesterol 129.0 63.0 - 159.0 mg/dL    HDL/Cholesterol Ratio 16.5 (L) 20.0 - 50.0 %    Total  Cholesterol/HDL Ratio 6.1 (H) 2.0 - 5.0    Non-HDL Cholesterol 157 mg/dL   TSH   Result Value Ref Range    TSH 3.686 0.400 - 4.000 uIU/mL   TESTOSTERONE   Result Value Ref Range    Testosterone, Total 343 304 - 1227 ng/dL      Assessment:       1. Essential hypertension    2. Moderate episode of recurrent major depressive disorder    3. Mild intermittent asthma without complication    4. Morbid obesity    5. Blood in stool    6. Asthma, unspecified asthma severity, unspecified whether complicated, unspecified whether persistent        Plan:       Essential hypertension  -     amLODIPine (NORVASC) 10 MG tablet; Take 1 tablet (10 mg total) by mouth once daily.  Dispense: 90 tablet; Refill: 3  -     carvediloL (COREG) 12.5 MG tablet; Take 1 tablet (12.5 mg total) by mouth 2 (two) times daily with meals.  Dispense: 180 tablet; Refill: 3  -     losartan (COZAAR) 100 MG tablet; Take 1 tablet (100 mg total) by mouth once daily.  Dispense: 90 tablet; Refill: 3  -     indapamide (LOZOL) 2.5 MG Tab; Take 1 tablet (2.5 mg total) by mouth once daily.  Dispense: 90 tablet; Refill: 3  Continue current BP medications  Moderate episode of recurrent major depressive disorder  Continue mgt per Psych  Mild intermittent asthma without complication  -     albuterol (VENTOLIN HFA) 90 mcg/actuation inhaler; Inhale 2 puffs into the lungs every 6 (six) hours as needed for Wheezing.  Dispense: 18 g; Refill: 3  -     fluticasone-salmeterol diskus inhaler 250-50 mcg; Controller  Dispense: 180 each; Refill: 3  -     montelukast (SINGULAIR) 10 mg tablet; Take 1 tablet (10 mg total) by mouth every evening.  Dispense: 90 tablet; Refill: 3  Stable.  Continue Advair, Singulair.  Morbid obesity  -     naltrexone-bupropion (CONTRAVE) 8-90 mg TbSR; Take 2 tablets by mouth 2 (two) times daily.  Dispense: 120 tablet; Refill: 1  Continue Contrave at this time  Blood in stool  -     Ambulatory referral/consult to Gastroenterology; Future; Expected date:  04/03/2024  -     CMPD hydrocortisone 2%- LIDOcaine 3% suppository (RECTAL ROCKET); Place 1 suppository rectally every evening. Insert 1 suppository 3/4 of the way onto rectum. Wet for easier application. Repeat for 3 nights.  Dispense: 10 each; Refill: 1  Will treat for suspected hemorrhoid but due to age, will refer to GI for further evaluation.  Asthma, unspecified asthma severity, unspecified whether complicated, unspecified whether persistent          Medication List with Changes/Refills   New Medications    CMPD HYDROCORTISONE 2%- LIDOCAINE 3% SUPPOSITORY (RECTAL ROCKET)    Place 1 suppository rectally every evening. Insert 1 suppository 3/4 of the way onto rectum. Wet for easier application. Repeat for 3 nights.   Current Medications    AZELASTINE (ASTELIN) 137 MCG (0.1 %) NASAL SPRAY    1 spray (137 mcg total) by Nasal route 2 (two) times daily.    CHOLECALCIFEROL, VITAMIN D3, 125 MCG (5,000 UNIT) TAB    Take 5,000 Units by mouth once daily.    ESOMEPRAZOLE (NEXIUM) 20 MG CAPSULE    Take 20 mg by mouth once daily.    FLUOXETINE 20 MG CAPSULE    Take 3 capsules (60 mg total) by mouth once daily.    GLUCOSAMINE-CHONDROITIN 500-400 MG TABLET    Take 2 tablets by mouth Daily.    IBUPROFEN (ADVIL,MOTRIN) 200 MG TABLET    Take 200 mg by mouth every 8 (eight) hours as needed. 4 tab twice daily    LACTOBAC 42-BIFID 8-RLEBRI-LVR -50 MG PWPK    Take 1 Package by mouth.    MULTIVITAMIN (THERAGRAN) PER TABLET    Take 1 tablet by mouth once daily.    MUPIROCIN (BACTROBAN) 2 % OINTMENT    Apply topically 3 (three) times daily.    POTASSIUM CHLORIDE SA (K-DUR,KLOR-CON) 20 MEQ TABLET    Take 1 tablet (20 mEq total) by mouth 2 (two) times daily.    SOLIFENACIN (VESICARE) 10 MG TABLET    Take 1 tablet (10 mg total) by mouth once daily.    TRIAMCINOLONE ACETONIDE 0.1% (KENALOG) 0.1 % CREAM    Apply topically 2 (two) times daily. X 2 wks then off 2 wks. Repeat as needed    VRAYLAR 3 MG CAP    Take 3 mg by mouth every  morning.   Changed and/or Refilled Medications    Modified Medication Previous Medication    ALBUTEROL (VENTOLIN HFA) 90 MCG/ACTUATION INHALER albuterol (VENTOLIN HFA) 90 mcg/actuation inhaler       Inhale 2 puffs into the lungs every 6 (six) hours as needed for Wheezing.    Inhale 2 puffs into the lungs every 6 (six) hours as needed for Wheezing.    AMLODIPINE (NORVASC) 10 MG TABLET amLODIPine (NORVASC) 10 MG tablet       Take 1 tablet (10 mg total) by mouth once daily.    TAKE 1 TABLET DAILY    CARVEDILOL (COREG) 12.5 MG TABLET carvediloL (COREG) 12.5 MG tablet       Take 1 tablet (12.5 mg total) by mouth 2 (two) times daily with meals.    Take 1 tablet (12.5 mg total) by mouth 2 (two) times daily with meals.    FLUTICASONE-SALMETEROL DISKUS INHALER 250-50 MCG fluticasone-salmeterol diskus inhaler 250-50 mcg       Controller    USE 1 INHALATION ONCE DAILY AS DIRECTED (CONTROLLER).  EXPIRES 30 DAYS AFTER FIRST USE    INDAPAMIDE (LOZOL) 2.5 MG TAB indapamide (LOZOL) 2.5 MG Tab       Take 1 tablet (2.5 mg total) by mouth once daily.    TAKE 1 TABLET DAILY    LOSARTAN (COZAAR) 100 MG TABLET losartan (COZAAR) 100 MG tablet       Take 1 tablet (100 mg total) by mouth once daily.    TAKE 1 TABLET DAILY    MONTELUKAST (SINGULAIR) 10 MG TABLET montelukast (SINGULAIR) 10 mg tablet       Take 1 tablet (10 mg total) by mouth every evening.    Take 1 tablet (10 mg total) by mouth every evening.    NALTREXONE-BUPROPION (CONTRAVE) 8-90 MG TBSR CONTRAVE 8-90 mg TbSR       Take 2 tablets by mouth 2 (two) times daily.    Take two tablets by mouth twice daily   Discontinued Medications    CETIRIZINE-PSEUDOEPHEDRINE 5-120 MG TB12    Take 5 mg by mouth 2 (two) times daily.    KETOCONAZOLE (NIZORAL) 2 % CREAM    APPLY A THIN FILM TO AFFECTED AREA OF GROIN DAILY AS NEEDED FOR FLARE    KETOCONAZOLE (NIZORAL) 2 % SHAMPOO    Wash hair with medicated shampoo at least 2x/week - let sit on scalp at least 5 minutes prior to rinsing

## 2024-04-05 ENCOUNTER — TELEPHONE (OUTPATIENT)
Dept: FAMILY MEDICINE | Facility: CLINIC | Age: 44
End: 2024-04-05
Payer: COMMERCIAL

## 2024-04-05 NOTE — TELEPHONE ENCOUNTER
----- Message from Lex Auguste sent at 4/5/2024  9:26 AM CDT -----  Type:  Pharmacy Calling to Clarify an RX    Name of Caller:  Rosey from [facility]  Pharmacy Name:    [facility] - [place], [place] - [address] [place] [id number]  Phone: [phone] Fax: [phone]    Prescription Name:  fluticasone-salmeterol diskus inhaler 250-50 mcg  What do they need to clarify?:  Directions  Best Call Back Number:  [phone],  reference number: [id number]  Additional Information: Rosey from [facility] states that This is a last attempt to clarify Prescription directions. Please call back to advise, thanks!

## 2024-05-02 ENCOUNTER — OFFICE VISIT (OUTPATIENT)
Dept: GASTROENTEROLOGY | Facility: CLINIC | Age: 44
End: 2024-05-02
Payer: COMMERCIAL

## 2024-05-02 ENCOUNTER — LAB VISIT (OUTPATIENT)
Dept: LAB | Facility: HOSPITAL | Age: 44
End: 2024-05-02
Attending: NURSE PRACTITIONER
Payer: COMMERCIAL

## 2024-05-02 VITALS — WEIGHT: 315 LBS | BODY MASS INDEX: 42.66 KG/M2 | HEIGHT: 72 IN

## 2024-05-02 DIAGNOSIS — K59.09 CHRONIC CONSTIPATION: ICD-10-CM

## 2024-05-02 DIAGNOSIS — K92.1 HEMATOCHEZIA: Primary | ICD-10-CM

## 2024-05-02 DIAGNOSIS — K62.89 RECTAL PAIN: ICD-10-CM

## 2024-05-02 DIAGNOSIS — R93.3 ABNORMAL CT SCAN, GASTROINTESTINAL TRACT: ICD-10-CM

## 2024-05-02 DIAGNOSIS — K63.9 COLON WALL THICKENING: ICD-10-CM

## 2024-05-02 DIAGNOSIS — Z87.19 HISTORY OF GASTROESOPHAGEAL REFLUX (GERD): ICD-10-CM

## 2024-05-02 DIAGNOSIS — Z87.09 HISTORY OF ASTHMA: ICD-10-CM

## 2024-05-02 DIAGNOSIS — K92.1 HEMATOCHEZIA: ICD-10-CM

## 2024-05-02 LAB
CTP QC/QA: YES
ERYTHROCYTE [DISTWIDTH] IN BLOOD BY AUTOMATED COUNT: 12.5 % (ref 11.5–14.5)
FECAL OCCULT BLOOD, POC: POSITIVE
HCT VFR BLD AUTO: 43.1 % (ref 40–54)
HGB BLD-MCNC: 15 G/DL (ref 14–18)
MCH RBC QN AUTO: 31.4 PG (ref 27–31)
MCHC RBC AUTO-ENTMCNC: 34.8 G/DL (ref 32–36)
MCV RBC AUTO: 90 FL (ref 82–98)
PLATELET # BLD AUTO: 286 K/UL (ref 150–450)
PMV BLD AUTO: 12.7 FL (ref 9.2–12.9)
RBC # BLD AUTO: 4.77 M/UL (ref 4.6–6.2)
WBC # BLD AUTO: 8.35 K/UL (ref 3.9–12.7)

## 2024-05-02 PROCEDURE — 1160F RVW MEDS BY RX/DR IN RCRD: CPT | Mod: CPTII,S$GLB,, | Performed by: NURSE PRACTITIONER

## 2024-05-02 PROCEDURE — 3008F BODY MASS INDEX DOCD: CPT | Mod: CPTII,S$GLB,, | Performed by: NURSE PRACTITIONER

## 2024-05-02 PROCEDURE — 4010F ACE/ARB THERAPY RXD/TAKEN: CPT | Mod: CPTII,S$GLB,, | Performed by: NURSE PRACTITIONER

## 2024-05-02 PROCEDURE — 82270 OCCULT BLOOD FECES: CPT | Mod: S$GLB,,, | Performed by: NURSE PRACTITIONER

## 2024-05-02 PROCEDURE — 36415 COLL VENOUS BLD VENIPUNCTURE: CPT | Mod: PO | Performed by: NURSE PRACTITIONER

## 2024-05-02 PROCEDURE — 85027 COMPLETE CBC AUTOMATED: CPT | Performed by: NURSE PRACTITIONER

## 2024-05-02 PROCEDURE — 1159F MED LIST DOCD IN RCRD: CPT | Mod: CPTII,S$GLB,, | Performed by: NURSE PRACTITIONER

## 2024-05-02 PROCEDURE — 99204 OFFICE O/P NEW MOD 45 MIN: CPT | Mod: S$GLB,,, | Performed by: NURSE PRACTITIONER

## 2024-05-02 PROCEDURE — 99999 PR PBB SHADOW E&M-EST. PATIENT-LVL V: CPT | Mod: PBBFAC,,, | Performed by: NURSE PRACTITIONER

## 2024-05-02 RX ORDER — POLYETHYLENE GLYCOL 3350 17 G/17G
17 POWDER, FOR SOLUTION ORAL DAILY
Qty: 510 G | Refills: 2 | Status: SHIPPED | OUTPATIENT
Start: 2024-05-02

## 2024-05-02 RX ORDER — HYDROCORTISONE 25 MG/G
CREAM TOPICAL 2 TIMES DAILY
Qty: 30 G | Refills: 1 | Status: SHIPPED | OUTPATIENT
Start: 2024-05-02 | End: 2024-05-12

## 2024-05-02 NOTE — PATIENT INSTRUCTIONS
Lower GI Bleeding (Stable)  You have signs of blood in your stool. This is called rectal bleeding. The bleeding may have begun in another part of your gastrointestinal (GI) tract. If the blood is bright red, it is likely coming from the lower part of the GI tract. If the blood is black or dark, it might be coming from higher up in the GI tract. Very small amounts of GI bleeding may not be visible and can only be discovered during a test on your stool. Possible causes of lower GI bleeding include:  Hemorrhoids  Anal fissures  Diverticulitis  Inflammatory bowel disease (Crohn's disease or ulcerative colitis)  Polyps (growths) in the intestine  Note: Iron supplements and medicines for diarrhea or upset stomach can cause black stools. Foods such as licorice and red beets can also discolor the stool and be mistaken for bleeding. These are not bleeding and are not a cause for alarm.  Home care  You have not lost a large amount of blood and your condition appears stable at this time. You may resume normal activity as long as you feel well.  Avoid NSAIDs, such as aspirin, ibuprofen, or naproxen. They can irritate the stomach and cause further bleeding. If you are taking these medicines for other medical reasons, talk to your healthcare provider before you stop them.   Follow-up care  Follow up with your healthcare provider as advised. Further tests may be needed to find the cause of your bleeding.  When to seek medical advice  Call your healthcare provider for any of the following:  Large amount of rectal bleeding   Increasing abdominal pain  Weakness, dizziness  Call 911  Get emergency medical care if any of the following occur:  Loss of consciousness  Vomiting blood  Date Last Reviewed: 6/24/2015  © 2723-4290 [employer]. [address] [place] [address], [place] [address]. All rights reserved. This information is not intended as a substitute for professional medical care. Always follow your healthcare professional's  instructions.

## 2024-05-02 NOTE — PROGRESS NOTES
Subjective:       Patient ID: Greg Garcia is a 43 y.o. male Body mass index is 57.92 kg/m².    Chief Complaint: Rectal Bleeding    This patient is new to me.  Referring Provider: Dr. Derrell Tolliver for blood in stool.     GI Problem  The primary symptoms include weight loss (trying to lose weight, taking contrave) and hematochezia. Primary symptoms do not include fever, fatigue, abdominal pain, nausea, vomiting, diarrhea, melena, hematemesis or dysuria.   The hematochezia began more than 1 week ago (started a couple of months ago, unchanged overall). Frequency: occurs with most bowel movements; moderate amount of dark red blood on tissue, in bowl, and in stool; denies bleeding in between bowel movements.   The illness is also significant for constipation (chronic for several years; bowel movements are currently 2 times a week; taking probiotic daily; PAST TREATMENT: colace/gentle laxative- no relief). The illness does not include chills, dysphagia or odynophagia. Significant associated medical issues include GERD (controlled with taking nexium 20 mg once daily; takes continuous since reflux triggers his asthma).     Review of Systems   Constitutional:  Positive for weight loss (trying to lose weight, taking contrave). Negative for appetite change, chills, fatigue and fever.        Reports taking ibuprofen daily   HENT:  Negative for sore throat and trouble swallowing.    Respiratory:  Positive for shortness of breath (history of this with asthma; denies currently). Negative for cough and choking.    Cardiovascular:  Negative for chest pain.   Gastrointestinal:  Positive for anal bleeding, constipation (chronic for several years; bowel movements are currently 2 times a week; taking probiotic daily; PAST TREATMENT: colace/gentle laxative- no relief), hematochezia and rectal pain (occasional with bowel movements; prescribed rectal rocket NH by PCP but has not needed to use it). Negative for abdominal pain,  blood in stool, diarrhea, dysphagia, hematemesis, melena, nausea and vomiting.   Genitourinary:  Negative for difficulty urinating, dysuria and flank pain.   Neurological:  Negative for weakness.       Past Medical History:   Diagnosis Date    AR (allergic rhinitis)     Asthma     Cervicalgia     Hepatic steatosis     Morbid obesity     BMI 59    JOHN (obstructive sleep apnea)     cpap    Primary nocturnal enuresis 1995    until age 15, then resolved spontaneously     Past Surgical History:   Procedure Laterality Date    CIRCUMCISION       Family History   Problem Relation Name Age of Onset    Eczema Father      Lung cancer Maternal Grandmother          smoker    Allergic rhinitis Neg Hx      Allergies Neg Hx      Angioedema Neg Hx      Asthma Neg Hx      Atopy Neg Hx      Immunodeficiency Neg Hx      Rhinitis Neg Hx      Urticaria Neg Hx      Melanoma Neg Hx      Psoriasis Neg Hx      Lupus Neg Hx      Colon cancer Neg Hx      Crohn's disease Neg Hx      Ulcerative colitis Neg Hx       Social History     Tobacco Use    Smoking status: Never    Smokeless tobacco: Never   Substance Use Topics    Alcohol use: Yes     Comment: occasional- not on a weekly basis    Drug use: No     Wt Readings from Last 10 Encounters:   05/02/24 (!) 193.7 kg (427 lb 0.5 oz)   03/27/24 (!) 191.8 kg (422 lb 13.5 oz)   10/11/23 (!) 188.2 kg (415 lb)   09/14/23 (!) 188.7 kg (416 lb 0.1 oz)   05/15/23 (!) 190.8 kg (420 lb 10.2 oz)   10/20/22 (!) 195 kg (429 lb 14.4 oz)   07/22/22 (!) 190.1 kg (419 lb 1.5 oz)   03/16/22 (!) 201.9 kg (445 lb 1.7 oz)   03/04/22 (!) 203.2 kg (447 lb 15.6 oz)   01/03/22 (!) 208 kg (458 lb 8.9 oz)     Lab Results   Component Value Date    WBC 9.20 10/12/2023    HGB 14.8 10/12/2023    HCT 43.7 10/12/2023    MCV 91 10/12/2023     10/12/2023     CMP  Sodium   Date Value Ref Range Status   10/12/2023 141 136 - 145 mmol/L Final     Potassium   Date Value Ref Range Status   10/12/2023 3.5 3.5 - 5.1 mmol/L Final      Chloride   Date Value Ref Range Status   10/12/2023 103 95 - 110 mmol/L Final     CO2   Date Value Ref Range Status   10/12/2023 28 23 - 29 mmol/L Final     Glucose   Date Value Ref Range Status   10/12/2023 99 70 - 110 mg/dL Final     BUN   Date Value Ref Range Status   10/12/2023 10 6 - 20 mg/dL Final     Creatinine   Date Value Ref Range Status   10/12/2023 1.0 0.5 - 1.4 mg/dL Final     Calcium   Date Value Ref Range Status   10/12/2023 9.3 8.7 - 10.5 mg/dL Final     Total Protein   Date Value Ref Range Status   10/12/2023 6.6 6.0 - 8.4 g/dL Final     Albumin   Date Value Ref Range Status   10/12/2023 3.5 3.5 - 5.2 g/dL Final     Total Bilirubin   Date Value Ref Range Status   10/12/2023 0.6 0.1 - 1.0 mg/dL Final     Comment:     For infants and newborns, interpretation of results should be based  on gestational age, weight and in agreement with clinical  observations.    Premature Infant recommended reference ranges:  Up to 24 hours.............<8.0 mg/dL  Up to 48 hours............<12.0 mg/dL  3-5 days..................<15.0 mg/dL  6-29 days.................<15.0 mg/dL       Alkaline Phosphatase   Date Value Ref Range Status   10/12/2023 98 55 - 135 U/L Final     AST   Date Value Ref Range Status   10/12/2023 18 10 - 40 U/L Final     ALT   Date Value Ref Range Status   10/12/2023 21 10 - 44 U/L Final     Anion Gap   Date Value Ref Range Status   10/12/2023 10 8 - 16 mmol/L Final     eGFR if    Date Value Ref Range Status   07/29/2022 >60.0 >60 mL/min/1.73 m^2 Final     eGFR if non    Date Value Ref Range Status   07/29/2022 >60.0 >60 mL/min/1.73 m^2 Final     Comment:     Calculation used to obtain the estimated glomerular filtration  rate (eGFR) is the CKD-EPI equation.        Lab Results   Component Value Date    LIPASERES 55 01/15/2020     Lab Results   Component Value Date    TSH 3.686 10/12/2023     Reviewed prior medical records including radiology report of 1/15/2020  CT abdomen pelvis.    Objective:      Physical Exam  Vitals and nursing note reviewed. Exam conducted with a chaperone present.   Constitutional:       General: He is not in acute distress.     Appearance: Normal appearance. He is well-developed. He is not diaphoretic.   HENT:      Mouth/Throat:      Lips: Pink. No lesions.      Mouth: Mucous membranes are moist. No oral lesions.      Tongue: No lesions.      Pharynx: Oropharynx is clear. No pharyngeal swelling or posterior oropharyngeal erythema.   Eyes:      General: No scleral icterus.     Conjunctiva/sclera: Conjunctivae normal.   Pulmonary:      Effort: Pulmonary effort is normal. No respiratory distress.      Breath sounds: Normal breath sounds. No wheezing.   Abdominal:      General: Bowel sounds are normal. There is no distension or abdominal bruit.      Palpations: Abdomen is soft. Abdomen is not rigid. There is no mass.      Tenderness: There is no abdominal tenderness. There is no guarding or rebound. Negative signs include Palmer's sign and McBurney's sign.   Genitourinary:     Rectum: External hemorrhoid (possible nonthrombosed external hemorrhoids vs excess skin) present. No mass, tenderness, anal fissure or internal hemorrhoid (none plapated with digital rectal examination). Normal anal tone.      Comments: Chaperone in room for rectal exam: PAULY Winn LPN.  Skin:     General: Skin is warm and dry.      Coloration: Skin is not jaundiced or pale.      Findings: No erythema or rash.   Neurological:      Mental Status: He is alert and oriented to person, place, and time.   Psychiatric:         Behavior: Behavior normal.         Thought Content: Thought content normal.         Judgment: Judgment normal.         Assessment:       1. Hematochezia    2. Rectal pain    3. Abnormal CT scan, gastrointestinal tract    4. Colon wall thickening    5. Chronic constipation    6. History of gastroesophageal reflux (GERD)    7. History of asthma        Plan:        Hematochezia  - schedule Colonoscopy, discussed procedure with the patient, including risks and benefits, patient verbalized understanding  - discussed about different etiologies that can cause rectal bleeding, such as but not limited to diverticulosis, polyps, colon mass, colon inflammation or infection, anal fissure or hemorrhoids.   - You may resume normal activity as long as you feel well.  - Avoid/minimize NSAIDs such as ibuprofen (Advil, Motrin) and naproxen (Aleve and Naprosyn).  - Avoid alcohol.  -     CBC Without Differential; Future; Expected date: 05/02/2024  -     POCT Occult Blood Stool  -  START   hydrocortisone (ANUSOL-HC) 2.5 % rectal cream; Place rectally 2 (two) times daily. PRN for 10 days  Dispense: 30 g; Refill: 1    Rectal pain  -  START   hydrocortisone (ANUSOL-HC) 2.5 % rectal cream; Place rectally 2 (two) times daily. PRN for 10 days  Dispense: 30 g; Refill: 1  - schedule Colonoscopy, discussed procedure with the patient, including risks and benefits, patient verbalized understanding  - avoid constipation and straining with bowel movements; try using an OTC stool softener as directed and increase fiber in diet (20-30 grams daily)/OTC fiber supplement such as metamucil (take as directed)  - recommend SITZ baths  - possible referral to colorectal surgery if symptoms persist    Abnormal CT scan, gastrointestinal tract: Colon wall thickening  - schedule Colonoscopy, discussed procedure with the patient, including risks and benefits, patient verbalized understanding    Chronic constipation  -   START  polyethylene glycol (GLYCOLAX) 17 gram/dose powder; Take 17 g by mouth once daily. Mix with 8 oz of water/liquid.  Dispense: 510 g; Refill: 2  - schedule Colonoscopy, discussed procedure with the patient, including risks and benefits, patient verbalized understanding  Recommend daily exercise as tolerated, adequate water intake (six 8-oz glasses of water daily), and high fiber diet. OTC fiber  supplements are recommended if diet does not reach daily fiber goal (20-30 grams daily), such as Metamucil, Citrucel, or FiberCon (take as directed, separate from other oral medications by >2 hours).  -If still no improvement with these measures, call/follow-up    History of gastroesophageal reflux (GERD)  - continue nexium 20 mg once daily as directed  - recommended scheduling EGD, discussed procedure with patient, including risks and benefits, patient verbalized understanding but patient declined scheduling EGD at this time    History of asthma  - follow-up with PCP/pulmonology for continued evaluation and management  - if experiencing symptoms of headache, chest pain, severe/persistent shortness of breath, dizziness, and/or blurred vision, recommend going to ER for further evaluation and management    Follow up in about 1 month (around 6/2/2024), or if symptoms worsen or fail to improve.      If no improvement in symptoms or symptoms worsen, call/follow-up at clinic or go to ER.        43 minutes of total time spent on the encounter, which includes face to face time and non-face to face time preparing to see the patient (e.g., review of tests), Obtaining and/or reviewing separately obtained history, Documenting clinical information in the electronic or other health record, Independently interpreting results (not separately reported) and communicating results to the patient/family/caregiver, or Care coordination (not separately reported).

## 2024-06-17 DIAGNOSIS — E66.01 MORBID OBESITY: ICD-10-CM

## 2024-06-17 RX ORDER — NALTREXONE HYDROCHLORIDE AND BUPROPION HYDROCHLORIDE 8; 90 MG/1; MG/1
2 TABLET, EXTENDED RELEASE ORAL 2 TIMES DAILY
Qty: 120 TABLET | Refills: 3 | Status: SHIPPED | OUTPATIENT
Start: 2024-06-17

## 2024-06-17 NOTE — TELEPHONE ENCOUNTER
Refill Routing Note   Medication(s) are not appropriate for processing by [facility] for the following reason(s):        Outside of protocol    [facility] action(s):  Route               Appointments  past 12m or future 3m with PCP    Date Provider   Last Visit   3/27/2024 Derrell Tolliver MD   Next Visit   10/30/2024 Derrell Tolliver MD   ED visits in past 90 days: 0        Note composed:12:43 PM 06/17/2024

## 2024-06-17 NOTE — TELEPHONE ENCOUNTER
No care due was identified.  [facility] Embedded Care Due Messages. Reference number: [id number].   6/17/2024 11:15:18 AM CDT

## 2024-07-02 ENCOUNTER — TELEPHONE (OUTPATIENT)
Dept: GASTROENTEROLOGY | Facility: CLINIC | Age: 44
End: 2024-07-02
Payer: COMMERCIAL

## 2024-07-02 NOTE — TELEPHONE ENCOUNTER
----- Message from Valentin Jose sent at 7/2/2024  2:07 PM CDT -----  Type:  Sooner Appointment Request    Caller is requesting a sooner appointment.  Caller declined first available appointment listed below.  Caller will not accept being placed on the waitlist and is requesting a message be sent to doctor.    Name of Caller:  pt   When is the first available appointment?  Na  Symptoms:  procedure appt   Would the patient rather a call back or a response via [facility]? Call back   Best Call Back Number:  [phone]    Additional Information:  pt stated he would like to be advised in regards to getting appt reschd due to having no ride for appt please ensure to call back to advise and chyna asap thanks!

## 2024-07-02 NOTE — TELEPHONE ENCOUNTER
Returned call to the patient, patient requested to reschedule his procedure due to not having a ride home from the procedure, procedure rescheduled, patient verbalized understanding of this.

## 2024-09-10 ENCOUNTER — TELEPHONE (OUTPATIENT)
Dept: GASTROENTEROLOGY | Facility: CLINIC | Age: 44
End: 2024-09-10
Payer: COMMERCIAL

## 2024-09-10 DIAGNOSIS — Q43.8 TORTUOUS COLON: Primary | ICD-10-CM

## 2024-09-24 ENCOUNTER — TELEPHONE (OUTPATIENT)
Dept: GASTROENTEROLOGY | Facility: CLINIC | Age: 44
End: 2024-09-24
Payer: COMMERCIAL

## 2024-09-24 NOTE — TELEPHONE ENCOUNTER
Left message for pt to return call to clinic to discuss c-scope & recommendation. Number provided.

## 2024-10-30 ENCOUNTER — TELEPHONE (OUTPATIENT)
Dept: PHARMACY | Facility: CLINIC | Age: 44
End: 2024-10-30
Payer: COMMERCIAL

## 2024-10-30 ENCOUNTER — OFFICE VISIT (OUTPATIENT)
Dept: FAMILY MEDICINE | Facility: CLINIC | Age: 44
End: 2024-10-30
Payer: COMMERCIAL

## 2024-10-30 VITALS
OXYGEN SATURATION: 97 % | BODY MASS INDEX: 42.66 KG/M2 | DIASTOLIC BLOOD PRESSURE: 68 MMHG | WEIGHT: 315 LBS | HEIGHT: 72 IN | HEART RATE: 83 BPM | SYSTOLIC BLOOD PRESSURE: 116 MMHG

## 2024-10-30 DIAGNOSIS — Z00.00 WELL ADULT EXAM: Primary | ICD-10-CM

## 2024-10-30 DIAGNOSIS — E66.813 CLASS 3 SEVERE OBESITY WITH BODY MASS INDEX (BMI) OF 50.0 TO 59.9 IN ADULT, UNSPECIFIED OBESITY TYPE, UNSPECIFIED WHETHER SERIOUS COMORBIDITY PRESENT: ICD-10-CM

## 2024-10-30 DIAGNOSIS — J45.20 MILD INTERMITTENT ASTHMA WITHOUT COMPLICATION: ICD-10-CM

## 2024-10-30 DIAGNOSIS — Z23 NEED FOR VACCINATION: ICD-10-CM

## 2024-10-30 DIAGNOSIS — I10 ESSENTIAL HYPERTENSION: ICD-10-CM

## 2024-10-30 DIAGNOSIS — F33.1 MODERATE EPISODE OF RECURRENT MAJOR DEPRESSIVE DISORDER: ICD-10-CM

## 2024-10-30 DIAGNOSIS — E66.01 CLASS 3 SEVERE OBESITY WITH BODY MASS INDEX (BMI) OF 50.0 TO 59.9 IN ADULT, UNSPECIFIED OBESITY TYPE, UNSPECIFIED WHETHER SERIOUS COMORBIDITY PRESENT: ICD-10-CM

## 2024-10-30 PROCEDURE — 3074F SYST BP LT 130 MM HG: CPT | Mod: CPTII,S$GLB,, | Performed by: FAMILY MEDICINE

## 2024-10-30 PROCEDURE — 90471 IMMUNIZATION ADMIN: CPT | Mod: S$GLB,,, | Performed by: FAMILY MEDICINE

## 2024-10-30 PROCEDURE — 1160F RVW MEDS BY RX/DR IN RCRD: CPT | Mod: CPTII,S$GLB,, | Performed by: FAMILY MEDICINE

## 2024-10-30 PROCEDURE — 3078F DIAST BP <80 MM HG: CPT | Mod: CPTII,S$GLB,, | Performed by: FAMILY MEDICINE

## 2024-10-30 PROCEDURE — 90656 IIV3 VACC NO PRSV 0.5 ML IM: CPT | Mod: S$GLB,,, | Performed by: FAMILY MEDICINE

## 2024-10-30 PROCEDURE — 4010F ACE/ARB THERAPY RXD/TAKEN: CPT | Mod: CPTII,S$GLB,, | Performed by: FAMILY MEDICINE

## 2024-10-30 PROCEDURE — 3008F BODY MASS INDEX DOCD: CPT | Mod: CPTII,S$GLB,, | Performed by: FAMILY MEDICINE

## 2024-10-30 PROCEDURE — 99396 PREV VISIT EST AGE 40-64: CPT | Mod: 25,S$GLB,, | Performed by: FAMILY MEDICINE

## 2024-10-30 PROCEDURE — 1159F MED LIST DOCD IN RCRD: CPT | Mod: CPTII,S$GLB,, | Performed by: FAMILY MEDICINE

## 2024-10-30 PROCEDURE — 99999 PR PBB SHADOW E&M-EST. PATIENT-LVL IV: CPT | Mod: PBBFAC,,, | Performed by: FAMILY MEDICINE

## 2024-10-30 RX ORDER — TIRZEPATIDE 2.5 MG/.5ML
2.5 INJECTION, SOLUTION SUBCUTANEOUS
Qty: 2 ML | Refills: 1 | Status: SHIPPED | OUTPATIENT
Start: 2024-10-30

## 2024-11-11 NOTE — TELEPHONE ENCOUNTER
Attempted to call pt. To confirm appointment. No asnwer, left message to call the office back.   
24

## 2024-11-15 ENCOUNTER — TELEPHONE (OUTPATIENT)
Dept: PHARMACY | Facility: CLINIC | Age: 44
End: 2024-11-15
Payer: COMMERCIAL

## 2024-11-15 NOTE — TELEPHONE ENCOUNTER
[facility]/Population Health Chart Review & Patient Outreach Details For Medication Adherence Project    Reason for Outreach Encounter: 3rd Party payor non-compliance report ([employer], [employer], C, etc)  2.  Patient Outreach Method: Reviewed patient chart   3.   Medication in question:   Hypertension Medications               amLODIPine (NORVASC) 10 MG tablet Take 1 tablet (10 mg total) by mouth once daily.    carvediloL (COREG) 12.5 MG tablet Take 1 tablet (12.5 mg total) by mouth 2 (two) times daily with meals.    indapamide (LOZOL) 2.5 MG Tab Take 1 tablet (2.5 mg total) by mouth once daily.    losartan (COZAAR) 100 MG tablet Take 1 tablet (100 mg total) by mouth once daily.                 Losartan  last filled  11/12/24 for 90 day supply      4.  Reviewed and or Updates Made To: Patient Chart  5. Outreach Outcomes and/or actions taken: Patient filled medication and is on track to be adherent  Additional Notes:

## 2024-12-05 ENCOUNTER — HOSPITAL ENCOUNTER (OUTPATIENT)
Dept: RADIOLOGY | Facility: HOSPITAL | Age: 44
Discharge: HOME | End: 2024-12-05
Attending: INTERNAL MEDICINE
Payer: COMMERCIAL

## 2024-12-05 DIAGNOSIS — Q43.8 TORTUOUS COLON: ICD-10-CM

## 2024-12-05 PROCEDURE — 74018 RADEX ABDOMEN 1 VIEW: CPT | Mod: TC,FY,PO

## 2024-12-05 PROCEDURE — 74018 RADEX ABDOMEN 1 VIEW: CPT | Mod: 26,,, | Performed by: RADIOLOGY

## 2025-03-09 DIAGNOSIS — R35.1 NOCTURIA: ICD-10-CM

## 2025-03-09 DIAGNOSIS — N39.41 URGE INCONTINENCE: ICD-10-CM

## 2025-03-09 DIAGNOSIS — R35.0 URINARY FREQUENCY: ICD-10-CM

## 2025-03-09 NOTE — TELEPHONE ENCOUNTER
Care Due:                  Date            Visit Type   Department     Provider  --------------------------------------------------------------------------------                                EP -                              PRIMARY      [facility] FAMILY  Last Visit: 10-      CARE ([facility])   DESTINEY Tolliver                               -                              [facility]  Next Visit: 04-      CARE ([facility])   DESTINEY Tolliver                                                            Last  Test          Frequency    Reason                     Performed    Due Date  --------------------------------------------------------------------------------    CMP.........  12 months..  indapamide, losartan.....  10-   10-    HBA1C.......  6 months...  tirzepatide,.............  10-   04-    [facility] Embedded Care Due Messages. Reference number: [id number].   3/09/2025 3:24:10 PM CDT

## 2025-03-10 RX ORDER — SOLIFENACIN SUCCINATE 10 MG/1
10 TABLET, FILM COATED ORAL
Qty: 90 TABLET | Refills: 2 | Status: SHIPPED | OUTPATIENT
Start: 2025-03-10

## 2025-03-10 NOTE — TELEPHONE ENCOUNTER
Provider Staff:  Action required for this patient    Requires labs      Please see care gap opportunities below in Care Due Message.    Thanks!  [facility]     Appointments      Date Provider   Last Visit   10/30/2024 Derrell Tolliver MD   Next Visit   3/14/2025 Derrell Tolliver MD     Refill Decision Note   Greg Garcia  is requesting a refill authorization.  Brief Assessment and Rationale for Refill:  Approve     Medication Therapy Plan:  FOVS      Comments:     Note composed:9:12 AM 03/10/2025

## 2025-04-01 DIAGNOSIS — J45.20 MILD INTERMITTENT ASTHMA WITHOUT COMPLICATION: ICD-10-CM

## 2025-04-01 NOTE — TELEPHONE ENCOUNTER
No care due was identified.  [facility] Embedded Care Due Messages. Reference number: [id number].   4/01/2025 3:01:10 PM CDT

## 2025-04-02 NOTE — TELEPHONE ENCOUNTER
Refill Routing Note   Medication(s) are not appropriate for processing by [facility] for the following reason(s):        Drug-drug interaction  Clarification of medication (Rx) details    [facility] action(s):  Defer        Medication Therapy Plan: Drug-Drug: fluticasone-salmeterol 250-50 mcg/dose and carvediloL    Pharmacist review requested: Yes   Extended chart review required: Yes     Appointments  past 12m or future 3m with PCP    Date Provider   Last Visit   10/30/2024 Derrell Tolliver MD   Next Visit   5/8/2025 Derrell Tolliver MD   ED visits in past 90 days: 0        Note composed:11:55 AM 04/02/2025

## 2025-04-02 NOTE — TELEPHONE ENCOUNTER
Refill Routing Note   Medication(s) are not appropriate for processing by [facility] for the following reason(s):        Drug-drug interaction    [facility] action(s):  Defer      Medication Therapy Plan: Drug-Drug: fluticasone-salmeterol 250-50 mcg/dose and carvediloL    Pharmacist review requested: Yes     Appointments  past 12m or future 3m with PCP    Date Provider   Last Visit   10/30/2024 Derrell Tolliver MD   Next Visit   5/8/2025 Derrell Tolliver MD   ED visits in past 90 days: 0        Note composed:11:44 AM 04/02/2025

## 2025-04-03 RX ORDER — FLUTICASONE PROPIONATE AND SALMETEROL 250; 50 UG/1; UG/1
POWDER RESPIRATORY (INHALATION)
Qty: 180 EACH | Refills: 1 | Status: SHIPPED | OUTPATIENT
Start: 2025-04-03

## 2025-04-24 ENCOUNTER — TELEPHONE (OUTPATIENT)
Dept: PHARMACY | Facility: CLINIC | Age: 45
End: 2025-04-24
Payer: COMMERCIAL

## 2025-04-24 NOTE — TELEPHONE ENCOUNTER
[facility]/Population Health Chart Review & Patient Outreach Details For Medication Adherence Project    Reason for Outreach Encounter: 3rd Party payor non-compliance report ([employer], [employer], C, etc)  2.  Patient Outreach Method: Reviewed Patient Chart  3.   Medication in question: losartan   LAST FILLED: 3/9/25 for 90 day supply  Hypertension Medications              amLODIPine (NORVASC) 10 MG tablet Take 1 tablet (10 mg total) by mouth once daily.    carvediloL (COREG) 12.5 MG tablet Take 1 tablet (12.5 mg total) by mouth 2 (two) times daily with meals.    indapamide (LOZOL) 2.5 MG Tab Take 1 tablet (2.5 mg total) by mouth once daily.    losartan (COZAAR) 100 MG tablet Take 1 tablet (100 mg total) by mouth once daily.              4.  Reviewed and or Updates Made To: Patient Chart  5. Outreach Outcomes and/or actions taken: Patient filled medication and is on track to be adherent

## 2025-06-20 DIAGNOSIS — I10 ESSENTIAL HYPERTENSION: ICD-10-CM

## 2025-06-20 DIAGNOSIS — J45.20 MILD INTERMITTENT ASTHMA WITHOUT COMPLICATION: ICD-10-CM

## 2025-06-20 RX ORDER — AMLODIPINE BESYLATE 10 MG/1
10 TABLET ORAL DAILY
Qty: 90 TABLET | Refills: 1 | Status: SHIPPED | OUTPATIENT
Start: 2025-06-20

## 2025-06-20 NOTE — TELEPHONE ENCOUNTER
Care Due:                  Date            Visit Type   Department     Provider  --------------------------------------------------------------------------------                                EP -                              PRIMARY      [facility] FAMILY  Last Visit: 10-      CARE ([facility])   DESTINEY Tolliver                               -                              [facility]  Next Visit: 06-      CARE ([facility])   DESTINEY Tolliver                                                            Last  Test          Frequency    Reason                     Performed    Due Date  --------------------------------------------------------------------------------    CMP.........  12 months..  indapamide, losartan.....  10-   10-    HBA1C.......  6 months...  tirzepatide,.............  10-   04-    [facility] Embedded Care Due Messages. Reference number: [id number].   6/20/2025 5:14:18 PM CDT

## 2025-06-21 RX ORDER — MONTELUKAST SODIUM 10 MG/1
10 TABLET ORAL NIGHTLY
Qty: 90 TABLET | Refills: 1 | Status: SHIPPED | OUTPATIENT
Start: 2025-06-21

## 2025-06-21 RX ORDER — CARVEDILOL 12.5 MG/1
12.5 TABLET ORAL 2 TIMES DAILY WITH MEALS
Qty: 180 TABLET | Refills: 1 | Status: SHIPPED | OUTPATIENT
Start: 2025-06-21

## 2025-06-21 NOTE — TELEPHONE ENCOUNTER
Refill Routing Note   Medication(s) are not appropriate for processing by [facility] for the following reason(s):        Required labs outdated    [facility] action(s):  Defer  Approve     Requires labs : Yes      Medication Therapy Plan: Lab(s) recommended: CMP, A1c    Pharmacist review requested: Yes   Extended chart review required: Yes     Appointments  past 12m or future 3m with PCP    Date Provider   Last Visit   10/30/2024 Derrell Tolliver MD   Next Visit   6/30/2025 Derrell Tolliver MD   ED visits in past 90 days: 0        Note composed:11:21 AM 06/21/2025

## 2025-06-21 NOTE — TELEPHONE ENCOUNTER
Refill Routing Note   Medication(s) are not appropriate for processing by [facility] for the following reason(s):        Drug-disease interaction: No prior override by participating responsible provider    Required labs outdated    [facility] action(s):  Defer     Requires labs : Yes      Medication Therapy Plan: Lab(s) recommended: CMP, A1c    Pharmacist review requested: Yes     Appointments  past 12m or future 3m with PCP    Date Provider   Last Visit   10/30/2024 Derrell Tolliver MD   Next Visit   6/30/2025 Derrell Tolliver MD   ED visits in past 90 days: 0        Note composed:9:16 PM 06/20/2025

## 2025-06-22 RX ORDER — INDAPAMIDE 2.5 MG/1
2.5 TABLET ORAL
Qty: 90 TABLET | Refills: 3 | Status: SHIPPED | OUTPATIENT
Start: 2025-06-22

## 2025-06-22 RX ORDER — LOSARTAN POTASSIUM 100 MG/1
100 TABLET ORAL DAILY
Qty: 90 TABLET | Refills: 0 | Status: SHIPPED | OUTPATIENT
Start: 2025-06-22

## 2025-07-16 ENCOUNTER — OFFICE VISIT (OUTPATIENT)
Dept: FAMILY MEDICINE | Facility: CLINIC | Age: 45
End: 2025-07-16
Payer: COMMERCIAL

## 2025-07-16 VITALS
TEMPERATURE: 98 F | DIASTOLIC BLOOD PRESSURE: 100 MMHG | BODY MASS INDEX: 42.66 KG/M2 | HEIGHT: 72 IN | OXYGEN SATURATION: 95 % | SYSTOLIC BLOOD PRESSURE: 140 MMHG | HEART RATE: 88 BPM | WEIGHT: 315 LBS

## 2025-07-16 DIAGNOSIS — E66.813 CLASS 3 SEVERE OBESITY WITH BODY MASS INDEX (BMI) OF 60.0 TO 69.9 IN ADULT, UNSPECIFIED OBESITY TYPE, UNSPECIFIED WHETHER SERIOUS COMORBIDITY PRESENT: Primary | ICD-10-CM

## 2025-07-16 DIAGNOSIS — F33.1 MODERATE EPISODE OF RECURRENT MAJOR DEPRESSIVE DISORDER: ICD-10-CM

## 2025-07-16 PROCEDURE — 99999 PR PBB SHADOW E&M-EST. PATIENT-LVL IV: CPT | Mod: PBBFAC,,, | Performed by: FAMILY MEDICINE

## 2025-07-16 RX ORDER — PHENTERMINE HYDROCHLORIDE 37.5 MG/1
37.5 TABLET ORAL
Qty: 30 TABLET | Refills: 0 | Status: SHIPPED | OUTPATIENT
Start: 2025-07-16 | End: 2025-08-15

## 2025-07-16 NOTE — PROGRESS NOTES
Subjective:       Patient ID: Greg Garcia is a 45 y.o. male.    Chief Complaint: Obesity    Here today to follow up on chronic medical conditions.     Obesity:  he was on Contrave.  He had been losing weight, but then was gaining weight.  We attempted to start Zepbound in Oct.  He has gained 41 lbs since that visit.    MDD: Seeing Dr. Olivas, medical psychologist, for anxiety/depression management.  Also, see Dr. Blanco, psychotherapist, for counseling. On prozac and Vrylar  HTN:  he is on Norvasc, losartan, indapamide and Coreg  Asthma: He is on Advair, Singular and Albuterol for PRN use.  JOHN:  on CPAP        Review of Systems   Constitutional:  Negative for appetite change, fatigue and fever.   Respiratory:  Negative for cough, shortness of breath and wheezing.    Cardiovascular:  Negative for chest pain and palpitations.   Gastrointestinal:  Negative for abdominal pain, constipation, diarrhea, nausea and vomiting.   Genitourinary:  Negative for difficulty urinating, dysuria, frequency and hematuria.   Neurological:  Negative for dizziness, syncope, weakness and headaches.       Objective:      Vitals:    07/16/25 1608   BP: (!) 140/100   Pulse: 88   Temp: 98.4 °F (36.9 °C)   TempSrc: Oral   SpO2: 95%   Weight: (!) 217 kg (478 lb 6.4 oz)   Height: 6' (1.829 m)      Physical Exam  Constitutional:       General: He is not in acute distress.     Appearance: He is obese.   Cardiovascular:      Rate and Rhythm: Normal rate and regular rhythm.      Heart sounds: Normal heart sounds. No murmur heard.  Pulmonary:      Effort: Pulmonary effort is normal. No respiratory distress.      Breath sounds: Normal breath sounds. No wheezing, rhonchi or rales.   Neurological:      Mental Status: He is alert.   Psychiatric:         Mood and Affect: Mood normal.         Behavior: Behavior normal.         Thought Content: Thought content normal.         Results for orders placed or performed in visit on 05/02/24   CBC  Without Differential    Collection Time: 05/02/24  4:33 PM   Result Value Ref Range    WBC 8.35 3.90 - 12.70 K/uL    RBC 4.77 4.60 - 6.20 M/uL    Hemoglobin 15.0 14.0 - 18.0 g/dL    Hematocrit 43.1 40.0 - 54.0 %    MCV 90 82 - 98 fL    MCH 31.4 (H) 27.0 - 31.0 pg    MCHC 34.8 32.0 - 36.0 g/dL    RDW 12.5 11.5 - 14.5 %    Platelets 286 150 - 450 K/uL    MPV 12.7 9.2 - 12.9 fL      Assessment:       1. Class 3 severe obesity with body mass index (BMI) of 60.0 to 69.9 in adult, unspecified obesity type, unspecified whether serious comorbidity present    2. Moderate episode of recurrent major depressive disorder        Plan:       Class 3 severe obesity with body mass index (BMI) of 60.0 to 69.9 in adult, unspecified obesity type, unspecified whether serious comorbidity present  -     phentermine (ADIPEX-P) 37.5 mg tablet; Take 1 tablet (37.5 mg total) by mouth before breakfast.  Dispense: 30 tablet; Refill: 0    Moderate episode of recurrent major depressive disorder    He is currently not employed and has a very tight budget.  Therefore, trial of Adipex at this time.  We discussed possible transition back to [facility] in a few months as he can get that for $100/month.  We also discussed self pay GLP options.  Not an option for him at this time, but maybe in the future.      F/U in 1 month for Obesity    Medication List with Changes/Refills   New Medications    PHENTERMINE (ADIPEX-P) 37.5 MG TABLET    Take 1 tablet (37.5 mg total) by mouth before breakfast.   Current Medications    ALBUTEROL (VENTOLIN HFA) 90 MCG/ACTUATION INHALER    Inhale 2 puffs into the lungs every 6 (six) hours as needed for Wheezing.    AMLODIPINE (NORVASC) 10 MG TABLET    Take 1 tablet (10 mg total) by mouth once daily.    BUPROPION (WELLBUTRIN SR) 150 MG TBSR 12 HR TABLET    Take 150 mg by mouth 2 (two) times daily.    CARVEDILOL (COREG) 12.5 MG TABLET    Take 1 tablet (12.5 mg total) by mouth 2 (two) times daily with meals.    CHOLECALCIFEROL,  VITAMIN D3, 125 MCG (5,000 UNIT) TAB    Take 5,000 Units by mouth once daily.    ESOMEPRAZOLE (NEXIUM) 20 MG CAPSULE    Take 20 mg by mouth once daily.    FLUOXETINE 20 MG CAPSULE    Take 3 capsules (60 mg total) by mouth once daily.    FLUTICASONE-SALMETEROL DISKUS INHALER 250-50 MCG    USE 1 INHALATION ONCE DAILY AS DIRECTED. EXPIRES 30 DAYS AFTER FIRST USE.    IBUPROFEN (ADVIL,MOTRIN) 200 MG TABLET    Take 200 mg by mouth every 8 (eight) hours as needed. 4 tab twice daily    INDAPAMIDE (LOZOL) 2.5 MG TAB    TAKE 1 TABLET DAILY    LOSARTAN (COZAAR) 100 MG TABLET    Take 1 tablet (100 mg total) by mouth once daily.    MONTELUKAST (SINGULAIR) 10 MG TABLET    Take 1 tablet (10 mg total) by mouth every evening.    MULTIVITAMIN (THERAGRAN) PER TABLET    Take 1 tablet by mouth once daily.    SOLIFENACIN (VESICARE) 10 MG TABLET    TAKE 1 TABLET DAILY    VRAYLAR 3 MG CAP    Take 3 mg by mouth every morning.   Discontinued Medications    GLUCOSAMINE-CHONDROITIN 500-400 MG TABLET    Take 2 tablets by mouth Daily.    LACTOBAC 42-BIFID 9-SBODCS-CQE -50 MG PWPK    Take 1 Package by mouth once daily.

## 2025-08-14 ENCOUNTER — OFFICE VISIT (OUTPATIENT)
Dept: FAMILY MEDICINE | Facility: CLINIC | Age: 45
End: 2025-08-14
Payer: COMMERCIAL

## 2025-08-14 VITALS
DIASTOLIC BLOOD PRESSURE: 74 MMHG | HEART RATE: 92 BPM | TEMPERATURE: 97 F | HEIGHT: 72 IN | OXYGEN SATURATION: 97 % | SYSTOLIC BLOOD PRESSURE: 144 MMHG | WEIGHT: 315 LBS | BODY MASS INDEX: 42.66 KG/M2

## 2025-08-14 DIAGNOSIS — E66.813 CLASS 3 SEVERE OBESITY WITH BODY MASS INDEX (BMI) OF 60.0 TO 69.9 IN ADULT: Primary | ICD-10-CM

## 2025-08-14 DIAGNOSIS — E66.813 CLASS 3 SEVERE OBESITY WITH BODY MASS INDEX (BMI) OF 60.0 TO 69.9 IN ADULT, UNSPECIFIED OBESITY TYPE, UNSPECIFIED WHETHER SERIOUS COMORBIDITY PRESENT: ICD-10-CM

## 2025-08-14 PROCEDURE — 99999 PR PBB SHADOW E&M-EST. PATIENT-LVL IV: CPT | Mod: PBBFAC,,, | Performed by: FAMILY MEDICINE

## 2025-08-14 RX ORDER — PHENTERMINE HYDROCHLORIDE 37.5 MG/1
37.5 TABLET ORAL
Qty: 30 TABLET | Refills: 2 | Status: SHIPPED | OUTPATIENT
Start: 2025-08-14